# Patient Record
Sex: FEMALE | Race: WHITE | NOT HISPANIC OR LATINO | Employment: OTHER | URBAN - METROPOLITAN AREA
[De-identification: names, ages, dates, MRNs, and addresses within clinical notes are randomized per-mention and may not be internally consistent; named-entity substitution may affect disease eponyms.]

---

## 2017-01-18 ENCOUNTER — GENERIC CONVERSION - ENCOUNTER (OUTPATIENT)
Dept: OTHER | Facility: OTHER | Age: 72
End: 2017-01-18

## 2017-04-24 ENCOUNTER — ALLSCRIPTS OFFICE VISIT (OUTPATIENT)
Dept: OTHER | Facility: OTHER | Age: 72
End: 2017-04-24

## 2017-04-24 DIAGNOSIS — E78.00 PURE HYPERCHOLESTEROLEMIA: ICD-10-CM

## 2017-04-24 DIAGNOSIS — I10 ESSENTIAL (PRIMARY) HYPERTENSION: ICD-10-CM

## 2017-04-24 DIAGNOSIS — E55.9 VITAMIN D DEFICIENCY: ICD-10-CM

## 2017-05-22 ENCOUNTER — APPOINTMENT (OUTPATIENT)
Dept: LAB | Age: 72
End: 2017-05-22
Payer: MEDICARE

## 2017-05-22 ENCOUNTER — TRANSCRIBE ORDERS (OUTPATIENT)
Dept: LAB | Age: 72
End: 2017-05-22

## 2017-05-22 DIAGNOSIS — M81.0 SENILE OSTEOPOROSIS: Primary | ICD-10-CM

## 2017-05-22 DIAGNOSIS — C50.911 MALIGNANT NEOPLASM OF RIGHT FEMALE BREAST, UNSPECIFIED SITE OF BREAST: ICD-10-CM

## 2017-05-22 DIAGNOSIS — C50.011 MALIGNANT NEOPLASM INVOLVING BOTH NIPPLE AND AREOLA OF RIGHT BREAST IN FEMALE (HCC): ICD-10-CM

## 2017-05-22 LAB
ALBUMIN SERPL BCP-MCNC: 3.8 G/DL (ref 3.5–5)
ALP SERPL-CCNC: 52 U/L (ref 46–116)
ALT SERPL W P-5'-P-CCNC: 21 U/L (ref 12–78)
ANION GAP SERPL CALCULATED.3IONS-SCNC: 5 MMOL/L (ref 4–13)
AST SERPL W P-5'-P-CCNC: 21 U/L (ref 5–45)
BILIRUB SERPL-MCNC: 0.96 MG/DL (ref 0.2–1)
BUN SERPL-MCNC: 12 MG/DL (ref 5–25)
CALCIUM SERPL-MCNC: 8.7 MG/DL (ref 8.3–10.1)
CHLORIDE SERPL-SCNC: 109 MMOL/L (ref 100–108)
CO2 SERPL-SCNC: 27 MMOL/L (ref 21–32)
CREAT SERPL-MCNC: 0.66 MG/DL (ref 0.6–1.3)
GFR SERPL CREATININE-BSD FRML MDRD: >60 ML/MIN/1.73SQ M
GLUCOSE P FAST SERPL-MCNC: 95 MG/DL (ref 65–99)
POTASSIUM SERPL-SCNC: 4.1 MMOL/L (ref 3.5–5.3)
PROT SERPL-MCNC: 6.9 G/DL (ref 6.4–8.2)
SODIUM SERPL-SCNC: 141 MMOL/L (ref 136–145)

## 2017-05-22 PROCEDURE — 80053 COMPREHEN METABOLIC PANEL: CPT

## 2017-05-22 PROCEDURE — 36415 COLL VENOUS BLD VENIPUNCTURE: CPT

## 2017-07-19 ENCOUNTER — GENERIC CONVERSION - ENCOUNTER (OUTPATIENT)
Dept: OTHER | Facility: OTHER | Age: 72
End: 2017-07-19

## 2017-09-13 ENCOUNTER — ALLSCRIPTS OFFICE VISIT (OUTPATIENT)
Dept: OTHER | Facility: OTHER | Age: 72
End: 2017-09-13

## 2017-09-15 ENCOUNTER — GENERIC CONVERSION - ENCOUNTER (OUTPATIENT)
Dept: OTHER | Facility: OTHER | Age: 72
End: 2017-09-15

## 2017-10-06 ENCOUNTER — APPOINTMENT (OUTPATIENT)
Dept: LAB | Age: 72
End: 2017-10-06
Payer: MEDICARE

## 2017-10-06 ENCOUNTER — TRANSCRIBE ORDERS (OUTPATIENT)
Dept: LAB | Age: 72
End: 2017-10-06

## 2017-10-06 DIAGNOSIS — E78.00 PURE HYPERCHOLESTEROLEMIA: ICD-10-CM

## 2017-10-06 DIAGNOSIS — E55.9 VITAMIN D DEFICIENCY: ICD-10-CM

## 2017-10-06 DIAGNOSIS — I10 ESSENTIAL (PRIMARY) HYPERTENSION: ICD-10-CM

## 2017-10-06 LAB
25(OH)D3 SERPL-MCNC: 24.2 NG/ML (ref 30–100)
ALBUMIN SERPL BCP-MCNC: 3.6 G/DL (ref 3.5–5)
ALP SERPL-CCNC: 51 U/L (ref 46–116)
ALT SERPL W P-5'-P-CCNC: 22 U/L (ref 12–78)
ANION GAP SERPL CALCULATED.3IONS-SCNC: 7 MMOL/L (ref 4–13)
AST SERPL W P-5'-P-CCNC: 20 U/L (ref 5–45)
BILIRUB DIRECT SERPL-MCNC: 0.17 MG/DL (ref 0–0.2)
BILIRUB SERPL-MCNC: 0.71 MG/DL (ref 0.2–1)
BUN SERPL-MCNC: 11 MG/DL (ref 5–25)
CALCIUM SERPL-MCNC: 8.7 MG/DL (ref 8.3–10.1)
CHLORIDE SERPL-SCNC: 109 MMOL/L (ref 100–108)
CHOLEST SERPL-MCNC: 171 MG/DL (ref 50–200)
CO2 SERPL-SCNC: 26 MMOL/L (ref 21–32)
CREAT SERPL-MCNC: 0.66 MG/DL (ref 0.6–1.3)
GFR SERPL CREATININE-BSD FRML MDRD: 89 ML/MIN/1.73SQ M
GLUCOSE P FAST SERPL-MCNC: 91 MG/DL (ref 65–99)
HDLC SERPL-MCNC: 52 MG/DL (ref 40–60)
LDLC SERPL CALC-MCNC: 84 MG/DL (ref 0–100)
POTASSIUM SERPL-SCNC: 3.7 MMOL/L (ref 3.5–5.3)
PROT SERPL-MCNC: 7 G/DL (ref 6.4–8.2)
SODIUM SERPL-SCNC: 142 MMOL/L (ref 136–145)
TRIGL SERPL-MCNC: 173 MG/DL

## 2017-10-06 PROCEDURE — 36415 COLL VENOUS BLD VENIPUNCTURE: CPT

## 2017-10-06 PROCEDURE — 80076 HEPATIC FUNCTION PANEL: CPT

## 2017-10-06 PROCEDURE — 80048 BASIC METABOLIC PNL TOTAL CA: CPT

## 2017-10-06 PROCEDURE — 82306 VITAMIN D 25 HYDROXY: CPT

## 2017-10-06 PROCEDURE — 80061 LIPID PANEL: CPT

## 2017-10-24 ENCOUNTER — ALLSCRIPTS OFFICE VISIT (OUTPATIENT)
Dept: OTHER | Facility: OTHER | Age: 72
End: 2017-10-24

## 2017-10-25 NOTE — PROGRESS NOTES
Assessment  1  Benign essential hypertension (401 1) (I10)   2  Hypercholesteremia (272 0) (E78 00)   3  Essential hypertriglyceridemia (272 1) (E78 1)   4  Anxiety (300 00) (F41 9)   5  Insomnia (780 52) (G47 00)    Plan  Anxiety, Depression, unspecified depression type, Insomnia    · Sertraline HCl - 50 MG Oral Tablet; TAKE 1 TABLET DAILY  Benign essential hypertension    · AmLODIPine Besylate 10 MG Oral Tablet; TAKE 1 TABLET DAILY   · Benazepril HCl - 10 MG Oral Tablet; TAKE 1 TABLET Once TDD:1  Hypercholesteremia    · Simvastatin 20 MG Oral Tablet; TAKE 1 TABLET DAILY  Insomnia    · Rozerem 8 MG Oral Tablet; TAKE 1 TABLET AT BEDTIME AS NEEDED FOR SLEEP   · Follow-up visit in 5 months Evaluation and Treatment  Follow-up  Status: Hold For -  Scheduling  Requested for: 70JAB8810    Discussion/Summary    Reviewed labs  Encouraged positive activity and nutrition  Educational resources provided: [employer]  Possible side effects of new medications were reviewed with the patient/guardian today  The treatment plan was reviewed with the patient/guardian  The patient/guardian understands and agrees with the treatment plan     Self Referrals: No      Chief Complaint  pt here for 6 month follow up for anxiety and to review BWc/o not sleeping      History of Present Illness  Follow up, review labs  Hard to get to sleep  Has a friend using Rozerem  Thinks about breast CA returning  Worries a lot  Review of Systems    Constitutional: No fever, no chills, feels well, no tiredness, no recent weight gain or weight loss  Eyes: No complaints of eye pain, no red eyes, no eyesight problems, no discharge, no dry eyes, no itching of eyes  ENT: no complaints of earache, no loss of hearing, no nose bleeds, no nasal discharge, no sore throat, no hoarseness  Cardiovascular: No complaints of slow heart rate, no fast heart rate, no chest pain, no palpitations, no leg claudication, no lower extremity edema     Respiratory: No complaints of shortness of breath, no wheezing, no cough, no SOB on exertion, no orthopnea, no PND  Gastrointestinal: No complaints of abdominal pain, no constipation, no nausea or vomiting, no diarrhea, no bloody stools  Genitourinary: No complaints of dysuria, no incontinence, no pelvic pain, no dysmenorrhea, no vaginal discharge or bleeding  Musculoskeletal: No complaints of arthralgias, no myalgias, no joint swelling or stiffness, no limb pain or swelling  Integumentary: No complaints of skin rash or lesions, no itching, no skin wounds, no breast pain or lump  Neurological: No complaints of headache, no confusion, no convulsions, no numbness, no dizziness or fainting, no tingling, no limb weakness, no difficulty walking  Psychiatric: anxiety-- and-- sleep disturbances, but-- as noted in HPI,-- not suicidal,-- no personality change,-- no depression-- and-- no emotional problems  Endocrine: No complaints of proptosis, no hot flashes, no muscle weakness, no deepening of the voice, no feelings of weakness  Hematologic/Lymphatic: No complaints of swollen glands, no swollen glands in the neck, does not bleed easily, does not bruise easily  Active Problems  1  Anxiety (300 00) (F41 9)   2  Arthralgia (719 40) (M25 50)   3  Benign essential hypertension (401 1) (I10)   4  Breast cancer screening (V76 10) (Z12 39)   5  Bulging Disc (C5 - C6) Right (722 0)   6  Bulging Disc (C6 - C7) Right (722 0)   7  Cervical cancer screening (V76 2) (Z12 4)   8  Chronic interstitial cystitis (595 1) (N30 10)   9  Depression, unspecified depression type (311) (F32 9)   10  Encounter for screening mammogram for breast cancer (V76 12) (Z12 31)   11  Essential hypertriglyceridemia (272 1) (E78 1)   12  GERD without esophagitis (530 81) (K21 9)   13  Glaucoma screening (V80 1) (Z13 5)   14  Hypercholesteremia (272 0) (E78 00)   15  Insomnia (780 52) (G47 00)   16   Medicare annual wellness visit, initial (V70 0) (Z00 00)   17  Other, multiple, and unspecified sites, insect bite, nonvenomous (919 4,E906 4)    (W57 XXXA)   18  Panic Disorder Without Agoraphobia (300 01)   19  Pes planus, unspecified laterality (734) (M21 40)   20  Rash (782 1) (R21)   21  Right radial head fracture (813 05) (S52 121A)   22  Screening for colon cancer (V76 51) (Z12 11)   23  Screening for depression (V79 0) (Z13 89)   24  Screening for genitourinary condition (V81 6) (Z13 89)   25  Tiredness (780 79) (R53 83)   26  Visit For: Screening Exam Neurological Disorders (V80 09)   27  Vitamin D deficiency (268 9) (E55 9)    Past Medical History  1  History of Cat bite, initial encounter (879 8,E906 3) (W55 01XA)   2  History of Cellulitis (682 9) (L03 90)   3  History of Elevated amylase (790 5) (R74 8)   4  History of Hay fever (477 9) (J30 1)   5  History of fall (V15 88) (Z91 81)   6  History of fatigue (V13 89) (Z87 898)   7  History of upper respiratory infection (V12 09) (Z87 09)   8  History of Irritable bowel syndrome (564 1) (K58 9)   9  History of Overactive bladder (596 51) (N32 81)   10  History of Postmenopausal Atrophic Vaginitis (627 3)   11  History of Serum lipase elevation (790 5) (R74 8)   12  History of Serum lipase elevation (790 5) (R74 8)   13  History of Subacute pancreatitis (577 0) (K85 90)   14  History of Subacute pancreatitis (577 0) (K85 90)   15  History of Subacute pancreatitis (577 0) (K85 90)   16  History of Urinary Tract Infection (V13 02)    Surgical History  1  History of Gallbladder Surgery   2  History of Gallbladder Surgery   3  History of Oral Surgery Tooth Extraction   4  Denied: History of Recent Surgery   5  History of Tonsillectomy    Family History  Mother    1  Family history of Hypertension (V17 49)   2  Family history of Mother  At Age 80  Father    3  Family history of Father  At Age 80   4  Family history of Heart Disease (V17 49)   5  Family history of Hypertension (V17 49)   6  Family history of Stroke Syndrome (V17 1)    Social History   · Being A Social Drinker   · Former smoker (D32 30) (W99 418)   · Uses Safety Equipment    Current Meds   1  AmLODIPine Besylate 10 MG Oral Tablet; TAKE 1 TABLET DAILY; Therapy: 36ZUS9624 to (Aidee Nicholas)  Requested for: 24Apr2017; Last   Rx:24Apr2017 Ordered   2  Benazepril HCl - 10 MG Oral Tablet; TAKE 1 TABLET Once TDD:1;   Therapy: 22Jan2013 to (Evaluate:21Oct2017)  Requested for: 24Apr2017; Last   Rx:24Apr2017 Ordered   3  Fosamax 70 MG Oral Tablet; TAKE 1 TABLET EVERY WEEK; Therapy: 24Apr2017 to Recorded   4  LORazepam 0 5 MG Oral Tablet; TAKE 1 PO 2-3 TIMES DAILY PRN MDD:3;   Therapy: 39EEV8074 to (Evaluate:18May2015)  Requested for: 70OAI4991; Last   Rx:19Nov2014 Ordered   5  LORazepam 0 5 MG Oral Tablet; take 1 tab BID PRN; Therapy: 19Mxw1407 to (Evaluate:12Nov2017); Last Rx:78Wlr2465 Ordered   6  Probiotic Oral Capsule; USE AS DIRECTED; Therapy: 94Yzy7239 to Recorded   7  Sertraline HCl - 50 MG Oral Tablet; TAKE 1 TABLET DAILY; Therapy: 33Mvm4700 to (Evaluate:11Jan2018)  Requested for: 74Jbf2479; Last   Rx:33Ltk9374 Ordered   8  Simvastatin 20 MG Oral Tablet; TAKE 1 TABLET DAILY; Therapy: 32FHB0347 to (Aidee Nicholas)  Requested for: 24Apr2017; Last   Rx:24Apr2017 Ordered   9  Tamoxifen Citrate 20 MG Oral Tablet; 1 tablet daily; Therapy: 24Apr2017 to Recorded   10  Turmeric Curcumin Oral Capsule; Therapy: 17NQV1063 to Recorded   11  Vitamin D 1000 UNIT Oral Tablet; Therapy: (Recorded:07Oct2016) to Recorded    The medication list was reviewed and updated today  Allergies  1  Bactrim TABS   2  Lyrica CAPS   3  Zithromax PACK   4  Pneumovax 23 INJ  5   Ragweed    Vitals  Vital Signs    Recorded: 78NJH2986 11:35AM   Temperature 98 4 F, Oral   Heart Rate 83   Systolic 491, LUE   Diastolic 74, LUE   Height 5 ft 4 in   Weight 145 lb    BMI Calculated 24 89   BSA Calculated 1 71   O2 Saturation 97     Physical Exam    Constitutional   General appearance: No acute distress, well appearing and well nourished  Eyes   Conjunctiva and lids: No swelling, erythema or discharge  Pupils and irises: Equal, round and reactive to light  Ears, Nose, Mouth, and Throat   External inspection of ears and nose: Normal     Otoscopic examination: Tympanic membranes translucent with normal light reflex  Canals patent without erythema  Nasal mucosa, septum, and turbinates: Normal without edema or erythema  Oropharynx: Normal with no erythema, edema, exudate or lesions  Pulmonary   Respiratory effort: No increased work of breathing or signs of respiratory distress  Auscultation of lungs: Clear to auscultation  Cardiovascular   Auscultation of heart: Normal rate and rhythm, normal S1 and S2, without murmurs  Examination of extremities for edema and/or varicosities: Normal     Carotid pulses: Normal     Abdomen   Abdomen: Non-tender, no masses  Liver and spleen: No hepatomegaly or splenomegaly  Lymphatic   Palpation of lymph nodes in neck: No lymphadenopathy  Musculoskeletal   Gait and station: Normal     Digits and nails: Normal without clubbing or cyanosis  Neurologic   Sensation: No sensory loss  Psychiatric   Orientation to person, place, and time: Normal     Mood and affect: Normal          Results/Data  PHQ-9 Adult Depression Screening 24Oct2017 11:42AM User, [employer]     Test Name Result Flag Reference   PHQ-9 Adult Depression Score 5     Over the last two weeks, how often have you been bothered by any of the following problems?   Little interest or pleasure in doing things: Not at all - 0  Feeling down, depressed, or hopeless: Several days - 1  Trouble falling or staying asleep, or sleeping too much: Nearly every day - 3  Feeling tired or having little energy: Several days - 1  Poor appetite or over eating: Not at all - 0  Feeling bad about yourself - or that you are a failure or have let yourself or your family down: Not at all - 0  Trouble concentrating on things, such as reading the newspaper or watching television: Not at all - 0  Moving or speaking so slowly that other people could have noticed  Or the opposite -  being so fidgety or restless that you have been moving around a lot more than usual: Not at all - 0  Thoughts that you would be better off dead, or of hurting yourself in some way: Not at all - 0   PHQ-9 Adult Depression Screening Negative     PHQ-9 Difficulty Level Not difficult at all     PHQ-9 Severity Mild Depression       Falls Risk Assessment (Dx Z13 89 Screen for Neurologic Disorder) 24Oct2017 11:41AM User, Ahs     Test Name Result Flag Reference   Falls Risk      No falls in the past year     (1) Itz 40GIB6622 07:37AM Rome Abdi Order Number: [id number]     Test Name Result Flag Reference   SODIUM 142 mmol/L  136-145   POTASSIUM 3 7 mmol/L  3 5-5 3   CHLORIDE 109 mmol/L H 100-108   CARBON DIOXIDE 26 mmol/L  21-32   ANION GAP (CALC) 7 mmol/L  4-13   BLOOD UREA NITROGEN 11 mg/dL  5-25   CREATININE 0 66 mg/dL  0 60-1 30   Standardized to IDMS reference method   CALCIUM 8 7 mg/dL  8 3-10 1   eGFR 89 ml/min/1 73sq m     National Kidney Disease Education Program recommendations are as follows:  GFR calculation is accurate only with a steady state creatinine  Chronic Kidney disease less than 60 ml/min/1 73 sq  meters  Kidney failure less than 15 ml/min/1 73 sq  meters  GLUCOSE FASTING 91 mg/dL  65-99   Specimen collection should occur prior to Sulfasalazine administration due to the potential for falsely depressed results  Specimen collection should occur prior to Sulfapyridine administration due to the potential for falsely elevated results       (1) HEPATIC FUNCTION PANEL 50HRE5886 07:37AM [place] Abdi Order Number: [id number]     Test Name Result Flag Reference   ALBUMIN 3 6 g/dL  3 5-5 0   ALK PHOSPHATAS 51 U/L     ALT (SGPT) 22 U/L  12-78   Specimen collection should occur prior to Sulfasalazine and/or Sulfapyridine administration due to the potential for falsely depressed results  AST(SGOT) 20 U/L  5-45   Specimen collection should occur prior to Sulfasalazine and/or Sulfapyridine administration due to the potential for falsely depressed results  BILI, DIRECT 0 17 mg/dL  0 00-0 20   BILI, TOTAL 0 71 mg/dL  0 20-1 00   TOTAL PROTEIN 7 0 g/dL  6 4-8 2     (1) LIPID PANEL, FASTING 30TBU2019 07:37AM Narcisa Mar Order Number: [id number]     Test Name Result Flag Reference   CHOLESTEROL 171 mg/dL     HDL,DIRECT 52 mg/dL  40-60   Specimen collection should occur prior to Metamizole administration due to the potential for falsley depressed results  LDL CHOLESTEROL CALCULATED 84 mg/dL  0-100   Triglyceride:        Normal <150 mg/dl   Borderline High 150-199 mg/dl   High 200-499 mg/dl   Very High >499 mg/dl      Cholesterol:       Desirable <200 mg/dl    Borderline High 200-239 mg/dl    High >239 mg/dl      HDL Cholesterol:       High>59 mg/dL    Low <41 mg/dL      This screening LDL is a calculated result  It does not have the accuracy of the Direct Measured LDL in the monitoring of patients with hyperlipidemia and/or statin therapy  Direct Measure LDL (BLG545) must be ordered separately in these patients  TRIGLYCERIDES 173 mg/dL H <=150   Specimen collection should occur prior to N-Acetylcysteine or Metamizole administration due to the potential for falsely depressed results       (1) VITAMIN D 25-HYDROXY 06Oct2017 07:37AM Narcisa Mar Order Number: [id number]     Test Name Result Flag Reference   VIT D 25-HYDROX 24 2 ng/mL L 30 0-100 0   This assay is a certified procedure of the CDC Vitamin D Standardization Certification Program (VDSCP)     Deficiency <20ng/ml   Insufficiency 20-30ng/ml   Sufficient  ng/ml     *Patients undergoing fluorescein dye angiography may retain small amounts of fluorescein in the body for 48-72 hours post procedure  Samples containing fluorescein can produce falsely elevated Vitamin D values  If the patient had this procedure, a specimen should be resubmitted post fluorescein clearance  Health Management  Benign essential hypertension   (1) COMPREHENSIVE METABOLIC PANEL; every 1 year; Last 28TCU7383; Next Due: 96Xmd4079; Active  (1) LIPID PANEL, FASTING; every 1 year; Last 14UTH5570; Next Due: 83WQA0676; Active  EKG/ECG- POC; every 1 year; Last 14VGE7539; Next Due: 98XUR0713; Overdue  Breast cancer screening   Digital Bilateral Screening Mammogram With CAD; every 1 year; Last 16DGD1798; Next Due:  35TSS5583; Overdue  Cervical cancer screening   (every) THINPREP PAP; every 2 years; Next Due: 72ZYS6929; Overdue  Glaucoma screening   *VB - Glaucoma Screen; every 1 year; Last 07WNE4295; Next Due: 61UHE3622; Overdue  Screening for colon cancer   COLONOSCOPY; every 10 years; Last 71YCB2763; Next Due: 13Qtj4019; Active  Screening for depression   *VB PREVENTIVE 65 MEASURE FALLS  AND UI SCREEN; every 1 year; Last 10MOY3398; Next Due:  46MVC0552; Overdue  Screening for genitourinary condition   *VB PREVENTIVE 65 MEASURE FALLS  AND UI SCREEN; every 1 year; Last 81YMB4569; Next Due:  76ADB1536; Overdue  Visit For: Screening Exam Neurological Disorders   *VB PREVENTIVE 65 MEASURE FALLS  AND UI SCREEN; every 1 year; Last 08XKI2236; Next Due:  61IIB8779; Overdue  Health Maintenance   Medicare Annual Wellness Visit; every 1 year; Next Due: 68NLV3305;  Overdue    Signatures   Electronically signed by : Hernan Gresham DO; Oct 24 2017 12:20PM EST                       (Author)

## 2017-12-08 ENCOUNTER — GENERIC CONVERSION - ENCOUNTER (OUTPATIENT)
Dept: FAMILY MEDICINE CLINIC | Facility: CLINIC | Age: 72
End: 2017-12-08

## 2018-01-10 NOTE — MISCELLANEOUS
Message     Date: 03/16/2016 06:36 PM,    Patient called to say she had a cat bite and went to [facility] urgent care  She was given a tetanus shot and an antibotic  Patient states the bite happened this morning by a neighborhood stray cat  Patient's neighbor usually feeds a group of cats every morning and she was helping put one of the cats into a cage when it bit her  They were not able to capture cat and the cats tail appeared to be bitten by another animal  Patient did have rabies shots about 6 years ago at [facility] and was told it may be good for 10 years but told by the urgent care provider to check with family Dr to make the call whether she needs the rabies shots again or not  Per Dr Tirso Barillas verbal orders, I called patient back and let her know Dr Ori Dodd did suggest she go to the [facility] emergency room to get the rabies injections  Patient stated she understood and will go  VR/vfp        Active Problems    1  Arthralgia (719 40) (M25 50)   2  Benign essential hypertension (401 1) (I10)   3  Breast cancer screening (V76 10) (Z12 39)   4  Bulging Disc (C5 - C6) Right (722 0)   5  Bulging Disc (C6 - C7) Right (722 0)   6  Cat bite, initial encounter (879 8,E906 3) (W55 01XA)   7  Cervical cancer screening (V76 2) (Z12 4)   8  Chronic interstitial cystitis (595 1) (N30 10)   9  Essential hypertriglyceridemia (272 1) (E78 1)   10  GERD without esophagitis (530 81) (K21 9)   11  Glaucoma screening (V80 1) (Z13 5)   12  Hypercholesteremia (272 0) (E78 0)   13  Insomnia (780 52) (G47 00)   14  Other, multiple, and unspecified sites, insect bite, nonvenomous (919 4,E906 4)    (W57 XXXA)   15  Panic Disorder Without Agoraphobia (300 01)   16  Pes planus, unspecified laterality (734) (M21 40)   17  Screening for colon cancer (V76 51) (Z12 11)   18  Screening for depression (V79 0) (Z13 89)   19  Screening for genitourinary condition (V81 6) (Z13 89)   20   Visit For: Screening Exam Neurological Disorders (V80 09)   21  Vitamin D deficiency (268 9) (E55 9)    Current Meds   1  AmLODIPine Besylate 10 MG Oral Tablet; TAKE 1 TABLET DAILY; Therapy: 04EMZ4963 to (Evaluate:18Mar2016)  Requested for: 28PWA8904; Last   Rx:24Mar2015 Ordered   2  Amoxicillin-Pot Clavulanate 875-125 MG Oral Tablet; TAKE 1 TABLET TWICE DAILY   UNTIL FINISHED; Therapy: 74TYA3495 to (Leonarda Pinto)  Requested for: 19UVL6622; Last   Rx:16Mar2016 Ordered   3  Benazepril HCl - 10 MG Oral Tablet; TAKE 1 TABLET Once TDD:1;   Therapy: 22Jan2013 to (Evaluate:30May2016)  Requested for: 04Xgc9274; Last   Rx:66Mjx7738 Ordered   4  Elmiron 100 MG Oral Capsule; Therapy: 13YQI2137 to (Last Rx:13Wbt5581)  Requested for: 55NIF4966 Ordered   5  LORazepam 0 5 MG Oral Tablet; TAKE 1 PO 2-3 TIMES DAILY PRN MDD:3;   Therapy: 40JCI4521 to (Evaluate:18May2015)  Requested for: 36DJH8280; Last   Rx:19Nov2014 Ordered   6  Simvastatin 20 MG Oral Tablet; TAKE 1 TABLET DAILY; Therapy: 40PGM0753 to (Evaluate:56Bpu1437)  Requested for: 49FRK8204; Last   Rx:36Jzv8732 Ordered    Allergies    1  Bactrim TABS   2  Lyrica CAPS   3  Zithromax PACK   4  Pneumovax 23 INJ    5   Ragweed    Signatures   Electronically signed by : Sujatha Sands DO; Mar 17 2016 11:50AM EST                       (Author)

## 2018-01-13 VITALS
DIASTOLIC BLOOD PRESSURE: 82 MMHG | RESPIRATION RATE: 16 BRPM | HEART RATE: 87 BPM | TEMPERATURE: 98.1 F | OXYGEN SATURATION: 96 % | SYSTOLIC BLOOD PRESSURE: 138 MMHG | BODY MASS INDEX: 24.24 KG/M2 | WEIGHT: 142 LBS | HEIGHT: 64 IN

## 2018-01-13 VITALS
BODY MASS INDEX: 24.75 KG/M2 | TEMPERATURE: 98.4 F | WEIGHT: 145 LBS | HEART RATE: 83 BPM | SYSTOLIC BLOOD PRESSURE: 130 MMHG | OXYGEN SATURATION: 97 % | DIASTOLIC BLOOD PRESSURE: 74 MMHG | HEIGHT: 64 IN

## 2018-01-14 VITALS — DIASTOLIC BLOOD PRESSURE: 68 MMHG | SYSTOLIC BLOOD PRESSURE: 120 MMHG

## 2018-01-29 ENCOUNTER — APPOINTMENT (OUTPATIENT)
Dept: RADIOLOGY | Age: 73
End: 2018-01-29
Attending: PHYSICIAN ASSISTANT
Payer: MEDICARE

## 2018-01-29 ENCOUNTER — APPOINTMENT (OUTPATIENT)
Dept: LAB | Age: 73
End: 2018-01-29
Payer: MEDICARE

## 2018-01-29 ENCOUNTER — TRANSCRIBE ORDERS (OUTPATIENT)
Dept: LAB | Age: 73
End: 2018-01-29

## 2018-01-29 ENCOUNTER — TRANSCRIBE ORDERS (OUTPATIENT)
Dept: ADMINISTRATIVE | Age: 73
End: 2018-01-29

## 2018-01-29 ENCOUNTER — OFFICE VISIT (OUTPATIENT)
Dept: URGENT CARE | Age: 73
End: 2018-01-29
Payer: MEDICARE

## 2018-01-29 VITALS
HEART RATE: 78 BPM | SYSTOLIC BLOOD PRESSURE: 120 MMHG | DIASTOLIC BLOOD PRESSURE: 80 MMHG | HEIGHT: 64 IN | RESPIRATION RATE: 18 BRPM | OXYGEN SATURATION: 98 % | BODY MASS INDEX: 24.24 KG/M2 | TEMPERATURE: 98 F | WEIGHT: 142 LBS

## 2018-01-29 DIAGNOSIS — M25.522 LEFT ELBOW PAIN: ICD-10-CM

## 2018-01-29 DIAGNOSIS — M25.532 LEFT WRIST PAIN: ICD-10-CM

## 2018-01-29 DIAGNOSIS — S50.02XA CONTUSION OF LEFT ELBOW, INITIAL ENCOUNTER: Primary | ICD-10-CM

## 2018-01-29 DIAGNOSIS — M81.0 SENILE OSTEOPOROSIS: Primary | ICD-10-CM

## 2018-01-29 DIAGNOSIS — C50.011 MALIGNANT NEOPLASM OF NIPPLE OF RIGHT BREAST IN FEMALE, UNSPECIFIED ESTROGEN RECEPTOR STATUS (HCC): ICD-10-CM

## 2018-01-29 LAB
ALBUMIN SERPL BCP-MCNC: 3.7 G/DL (ref 3.5–5)
ALP SERPL-CCNC: 47 U/L (ref 46–116)
ALT SERPL W P-5'-P-CCNC: 20 U/L (ref 12–78)
ANION GAP SERPL CALCULATED.3IONS-SCNC: 7 MMOL/L (ref 4–13)
AST SERPL W P-5'-P-CCNC: 20 U/L (ref 5–45)
BILIRUB SERPL-MCNC: 0.83 MG/DL (ref 0.2–1)
BUN SERPL-MCNC: 11 MG/DL (ref 5–25)
CALCIUM SERPL-MCNC: 8.4 MG/DL (ref 8.3–10.1)
CHLORIDE SERPL-SCNC: 109 MMOL/L (ref 100–108)
CO2 SERPL-SCNC: 26 MMOL/L (ref 21–32)
CREAT SERPL-MCNC: 0.78 MG/DL (ref 0.6–1.3)
GFR SERPL CREATININE-BSD FRML MDRD: 77 ML/MIN/1.73SQ M
GLUCOSE SERPL-MCNC: 127 MG/DL (ref 65–140)
POTASSIUM SERPL-SCNC: 3.9 MMOL/L (ref 3.5–5.3)
PROT SERPL-MCNC: 7.1 G/DL (ref 6.4–8.2)
SODIUM SERPL-SCNC: 142 MMOL/L (ref 136–145)

## 2018-01-29 PROCEDURE — 36415 COLL VENOUS BLD VENIPUNCTURE: CPT

## 2018-01-29 PROCEDURE — 73130 X-RAY EXAM OF HAND: CPT

## 2018-01-29 PROCEDURE — G0463 HOSPITAL OUTPT CLINIC VISIT: HCPCS | Performed by: FAMILY MEDICINE

## 2018-01-29 PROCEDURE — 73110 X-RAY EXAM OF WRIST: CPT

## 2018-01-29 PROCEDURE — 80053 COMPREHEN METABOLIC PANEL: CPT

## 2018-01-29 PROCEDURE — 73080 X-RAY EXAM OF ELBOW: CPT

## 2018-01-29 PROCEDURE — 99214 OFFICE O/P EST MOD 30 MIN: CPT | Performed by: FAMILY MEDICINE

## 2018-01-29 RX ORDER — ASPIRIN 81 MG/1
81 TABLET ORAL DAILY
COMMUNITY
End: 2020-10-19

## 2018-01-29 RX ORDER — BENAZEPRIL HYDROCHLORIDE 10 MG/1
10 TABLET ORAL
COMMUNITY
Start: 2015-11-02 | End: 2018-04-18 | Stop reason: SDUPTHER

## 2018-01-29 RX ORDER — LACTOBACILLUS RHAMNOSUS GG 10B CELL
1 CAPSULE ORAL
COMMUNITY
End: 2018-09-26

## 2018-01-29 RX ORDER — SIMVASTATIN 20 MG
20 TABLET ORAL
COMMUNITY
Start: 2015-10-26 | End: 2018-06-06 | Stop reason: SDUPTHER

## 2018-01-29 RX ORDER — ALENDRONATE SODIUM 70 MG/1
70 TABLET ORAL WEEKLY
COMMUNITY
Start: 2017-12-08 | End: 2019-02-18

## 2018-01-29 RX ORDER — RAMELTEON 8 MG/1
1 TABLET ORAL
COMMUNITY
Start: 2017-10-24 | End: 2018-09-26

## 2018-01-29 RX ORDER — TAMOXIFEN CITRATE 20 MG/1
20 TABLET ORAL DAILY
COMMUNITY
Start: 2017-12-08 | End: 2021-10-19

## 2018-01-29 RX ORDER — LORAZEPAM 0.5 MG/1
1 TABLET ORAL 2 TIMES DAILY PRN
COMMUNITY
Start: 2017-09-13 | End: 2018-04-18 | Stop reason: SDUPTHER

## 2018-01-29 RX ORDER — AMLODIPINE BESYLATE 10 MG/1
10 TABLET ORAL
COMMUNITY
Start: 2015-09-26 | End: 2018-06-06 | Stop reason: SDUPTHER

## 2018-01-29 RX ORDER — AMITRIPTYLINE HYDROCHLORIDE 25 MG/1
25 TABLET, FILM COATED ORAL
COMMUNITY
Start: 2015-11-28 | End: 2018-09-26

## 2018-01-29 NOTE — PROGRESS NOTES
Assessment/Plan     Diagnoses and all orders for this visit:    Left wrist pain  -     X-ray hand left 3+ views  -     X-ray wrist left 3+ views    Left elbow pain  -     X-ray elbow left 3+ views  Cannot exclude fracture- repeat xrays in 5-7 days                  Subjective:     Patient ID: Jose Fournier is a 71 y o  female  Chief Complaint:    80-year-old right-hand dominant female states she tripped over a table leg lasting her kitchen and fell onto her left side injuring her left elbow, left wrist and left knee  She states her left knee is nontender  Most of her pain is in her left elbow but also some pain in her left wrist   She did not apply ice but she did take only  She denies head injury  Past Medical History:   Diagnosis Date    Allergic     Anxiety     Arthritis     Bulging discs     Cancer ([address] )     Chronic interstitial cystitis     GERD (gastroesophageal reflux disease)     Hyperlipidemia     Hypertension     Psychiatric disorder     Vitamin D deficiency        Past Surgical History:   Procedure Laterality Date    CHOLECYSTECTOMY         No family history on file  Review of Systems   Constitutional: Negative  Musculoskeletal:        Left elbow pain, left wrist pain   Neurological: Negative for headaches  All other systems reviewed and are negative  Objective:    /80 (BP Location: Right arm, Patient Position: Sitting)   Pulse 78   Temp 98 °F (36 7 °C) (Temporal)   Resp 18   Ht 5' 4" (1 626 m)   Wt 64 4 kg (142 lb)   LMP  (LMP Unknown)   SpO2 98%   BMI 24 37 kg/m²     Physical Exam   Constitutional: She is oriented to person, place, and time  She appears well-developed and well-nourished  Musculoskeletal:        Left elbow: She exhibits decreased range of motion and swelling  Tenderness found  Radial head and olecranon process tenderness noted  Left wrist: She exhibits decreased range of motion and tenderness  She exhibits no swelling  Neurological: She is alert and oriented to person, place, and time  Psychiatric: She has a normal mood and affect  Her behavior is normal    Nursing note and vitals reviewed  X-ray elbow left 3+ views   Final Result by Carolee Adair MD (01/29 1054)      The presence of a left elbow joint effusion strongly suggests subtle nondisplaced left elbow fracture despite absence of direct evidence of detectable cortical fracture line  Repeat left elbow radiographic evaluation in 5-7 days is recommended  Workstation performed: [id number]         X-ray wrist left 3+ views   Final Result by Carolee Adair MD (01/29 1049)      No acute osseous abnormality  Degenerative changes as described  Workstation performed: [id number]         X-ray hand left 3+ views   Final Result by Carolee Adair MD (01/29 1049)      No acute osseous abnormality  Degenerative changes as described           Workstation performed: [id number]

## 2018-01-29 NOTE — PATIENT INSTRUCTIONS
They cannot exclude a fracture of your elbow, so the radiologist is suggesting a repeat x-ray in 5-7 days  Ice, elevate, take tylenol or motrin for discomfort  Use ace wrap for comfort during the day  Your family physician can order the repeat xray

## 2018-04-18 ENCOUNTER — OFFICE VISIT (OUTPATIENT)
Dept: FAMILY MEDICINE CLINIC | Facility: CLINIC | Age: 73
End: 2018-04-18
Payer: MEDICARE

## 2018-04-18 VITALS
OXYGEN SATURATION: 95 % | DIASTOLIC BLOOD PRESSURE: 86 MMHG | BODY MASS INDEX: 24.62 KG/M2 | HEART RATE: 86 BPM | TEMPERATURE: 98.6 F | HEIGHT: 64 IN | SYSTOLIC BLOOD PRESSURE: 134 MMHG | WEIGHT: 144.2 LBS

## 2018-04-18 DIAGNOSIS — E78.1 HYPERTRIGLYCERIDEMIA: ICD-10-CM

## 2018-04-18 DIAGNOSIS — F33.8 SEASONAL AFFECTIVE DISORDER (HCC): ICD-10-CM

## 2018-04-18 DIAGNOSIS — F51.02 ADJUSTMENT INSOMNIA: ICD-10-CM

## 2018-04-18 DIAGNOSIS — F41.9 ANXIETY: ICD-10-CM

## 2018-04-18 DIAGNOSIS — I10 ESSENTIAL HYPERTENSION: Primary | ICD-10-CM

## 2018-04-18 PROCEDURE — 99214 OFFICE O/P EST MOD 30 MIN: CPT | Performed by: FAMILY MEDICINE

## 2018-04-18 RX ORDER — LORAZEPAM 0.5 MG/1
0.5 TABLET ORAL 2 TIMES DAILY PRN
Qty: 30 TABLET | Refills: 1 | Status: SHIPPED | OUTPATIENT
Start: 2018-04-18 | End: 2018-09-26 | Stop reason: SDUPTHER

## 2018-04-18 RX ORDER — ESCITALOPRAM OXALATE 10 MG/1
10 TABLET ORAL DAILY
Qty: 90 TABLET | Refills: 1 | Status: SHIPPED | OUTPATIENT
Start: 2018-04-18 | End: 2018-09-26

## 2018-04-18 RX ORDER — BENAZEPRIL HYDROCHLORIDE 10 MG/1
10 TABLET ORAL DAILY
Qty: 90 TABLET | Refills: 1 | Status: SHIPPED | OUTPATIENT
Start: 2018-04-18 | End: 2018-06-06 | Stop reason: SDUPTHER

## 2018-04-18 NOTE — PATIENT INSTRUCTIONS
DC Zoloft and start Lexapro  Discussed lack of physical activity and need of some type of exercising  Reviewed nutrition

## 2018-04-18 NOTE — PROGRESS NOTES
Assessment/Plan: patient here today for follow up for hypertension and to talk about medication     No problem-specific Assessment & Plan notes found for this encounter  1  Essential hypertension  benazepril (LOTENSIN) 10 mg tablet   2  Anxiety  LORazepam (ATIVAN) 0 5 mg tablet    escitalopram (LEXAPRO) 10 mg tablet   3  Adjustment insomnia     4  Seasonal affective disorder ([address] )     5  Hypertriglyceridemia          There are no diagnoses linked to this encounter  Subjective:      Patient ID: Marinda Claude is a 71 y o  female  Follow up  Need change to Lexapro, breast CA recommended  Weaning off Elavil  Feels very stressed, tired of the Winter  Hard being in the house all winter and never should of retired 2 years ago  Continues with Tamoxifen for breast Ca  Problems sleeping  The following portions of the patient's history were reviewed and updated as appropriate: allergies, current medications, past family history, past medical history, past social history, past surgical history and problem list     Review of Systems   Constitutional: Negative  HENT: Negative  Respiratory: Negative  Cardiovascular: Negative  Gastrointestinal: Negative  Genitourinary: Negative  Musculoskeletal: Negative  Skin: Negative  Neurological: Negative  Psychiatric/Behavioral: The patient is nervous/anxious  Objective:      /86 (BP Location: Left arm, Patient Position: Sitting, Cuff Size: Standard)   Pulse 86   Temp 98 6 °F (37 °C) (Oral)   Ht 5' 4" (1 626 m)   Wt 65 4 kg (144 lb 3 2 oz)   LMP  (LMP Unknown)   SpO2 95%   BMI 24 75 kg/m²          Physical Exam   Constitutional: She is oriented to person, place, and time  She appears well-developed and well-nourished  HENT:   Head: Normocephalic and atraumatic  Right Ear: External ear normal    Left Ear: External ear normal    Cardiovascular: Normal rate, regular rhythm and normal heart sounds  Pulmonary/Chest: Effort normal and breath sounds normal    Abdominal: Soft  Bowel sounds are normal    Neurological: She is alert and oriented to person, place, and time  Skin: Skin is warm and dry     Psychiatric: Her behavior is normal  Judgment and thought content normal

## 2018-05-29 RX ORDER — AMLODIPINE BESYLATE 10 MG/1
TABLET ORAL
Qty: 90 TABLET | Refills: 1 | OUTPATIENT
Start: 2018-05-29

## 2018-05-29 RX ORDER — SIMVASTATIN 20 MG
TABLET ORAL
Qty: 90 TABLET | Refills: 1 | OUTPATIENT
Start: 2018-05-29

## 2018-06-06 ENCOUNTER — OFFICE VISIT (OUTPATIENT)
Dept: FAMILY MEDICINE CLINIC | Facility: CLINIC | Age: 73
End: 2018-06-06
Payer: MEDICARE

## 2018-06-06 VITALS
WEIGHT: 143.2 LBS | OXYGEN SATURATION: 98 % | BODY MASS INDEX: 24.45 KG/M2 | DIASTOLIC BLOOD PRESSURE: 68 MMHG | HEIGHT: 64 IN | TEMPERATURE: 98.4 F | SYSTOLIC BLOOD PRESSURE: 126 MMHG | HEART RATE: 72 BPM

## 2018-06-06 DIAGNOSIS — F41.9 ANXIETY: Primary | ICD-10-CM

## 2018-06-06 DIAGNOSIS — E78.00 HYPERCHOLESTEREMIA: ICD-10-CM

## 2018-06-06 DIAGNOSIS — I10 ESSENTIAL HYPERTENSION: ICD-10-CM

## 2018-06-06 DIAGNOSIS — L82.1 SEBORRHEIC KERATOSES: ICD-10-CM

## 2018-06-06 DIAGNOSIS — F32.0 CURRENT MILD EPISODE OF MAJOR DEPRESSIVE DISORDER WITHOUT PRIOR EPISODE (HCC): ICD-10-CM

## 2018-06-06 PROCEDURE — 99214 OFFICE O/P EST MOD 30 MIN: CPT | Performed by: FAMILY MEDICINE

## 2018-06-06 RX ORDER — BENAZEPRIL HYDROCHLORIDE 10 MG/1
10 TABLET ORAL DAILY
Qty: 90 TABLET | Refills: 1 | Status: SHIPPED | OUTPATIENT
Start: 2018-06-06 | End: 2018-09-26 | Stop reason: SDUPTHER

## 2018-06-06 RX ORDER — SIMVASTATIN 20 MG
20 TABLET ORAL
Qty: 90 TABLET | Refills: 1 | Status: SHIPPED | OUTPATIENT
Start: 2018-06-06 | End: 2018-12-26 | Stop reason: SDUPTHER

## 2018-06-06 RX ORDER — AMLODIPINE BESYLATE 10 MG/1
10 TABLET ORAL DAILY
Qty: 90 TABLET | Refills: 1 | Status: SHIPPED | OUTPATIENT
Start: 2018-06-06 | End: 2018-12-26 | Stop reason: SDUPTHER

## 2018-06-06 NOTE — PROGRESS NOTES
Assessment/Plan: patient here today for follow up for derepression and anxiety     No problem-specific Assessment & Plan notes found for this encounter  1  Anxiety     2  Current mild episode of major depressive disorder without prior episode (HCC)     3  Seborrheic keratoses     4  Essential hypertension     5  Hypercholesteremia          There are no diagnoses linked to this encounter  Subjective:      Patient ID: Yecenia Matos is a 71 y o  female  Follow up, doing much better   back to working  Feels nice and calm  Seems Derm in September  Has SK on lower extremities  Review of Systems   Constitutional: Negative  HENT: Negative  Respiratory: Negative  Cardiovascular: Negative  Gastrointestinal: Negative  Genitourinary: Negative  Skin:        Moles on LE's  Psychiatric/Behavioral: Negative for sleep disturbance and suicidal ideas  The patient is nervous/anxious  Objective:      /68 (BP Location: Left arm, Patient Position: Sitting, Cuff Size: Standard)   Pulse 72   Temp 98 4 °F (36 9 °C) (Oral)   Ht 5' 4" (1 626 m)   Wt 65 kg (143 lb 3 2 oz)   SpO2 98%   BMI 24 58 kg/m²          Physical Exam   Constitutional: She is oriented to person, place, and time  She appears well-developed and well-nourished  HENT:   Head: Normocephalic and atraumatic  Cardiovascular: Normal rate, regular rhythm and normal heart sounds  Pulmonary/Chest: Effort normal and breath sounds normal    Neurological: She is alert and oriented to person, place, and time  Skin:   SK LE's  Psychiatric: She has a normal mood and affect   Her behavior is normal  Judgment and thought content normal

## 2018-06-06 NOTE — PATIENT INSTRUCTIONS
Refill Lexapro  Physical activity  Continue BP Med  Hydration with water  Need proper rest   Discussed positive thinking  Reviewed nutrition, caution with processed foods

## 2018-09-26 ENCOUNTER — OFFICE VISIT (OUTPATIENT)
Dept: FAMILY MEDICINE CLINIC | Facility: CLINIC | Age: 73
End: 2018-09-26
Payer: MEDICARE

## 2018-09-26 VITALS
TEMPERATURE: 98.3 F | HEIGHT: 64 IN | SYSTOLIC BLOOD PRESSURE: 122 MMHG | HEART RATE: 80 BPM | BODY MASS INDEX: 24.21 KG/M2 | OXYGEN SATURATION: 95 % | DIASTOLIC BLOOD PRESSURE: 74 MMHG | WEIGHT: 141.8 LBS

## 2018-09-26 DIAGNOSIS — E55.9 VITAMIN D DEFICIENCY: Primary | ICD-10-CM

## 2018-09-26 DIAGNOSIS — F43.9 STRESS AT HOME: ICD-10-CM

## 2018-09-26 DIAGNOSIS — I10 ESSENTIAL HYPERTENSION: ICD-10-CM

## 2018-09-26 DIAGNOSIS — F41.9 ANXIETY: ICD-10-CM

## 2018-09-26 PROCEDURE — 99213 OFFICE O/P EST LOW 20 MIN: CPT | Performed by: FAMILY MEDICINE

## 2018-09-26 RX ORDER — LORAZEPAM 0.5 MG/1
0.5 TABLET ORAL 2 TIMES DAILY PRN
Qty: 30 TABLET | Refills: 1 | Status: SHIPPED | OUTPATIENT
Start: 2018-09-26 | End: 2018-11-13 | Stop reason: SDUPTHER

## 2018-09-26 RX ORDER — BENAZEPRIL HYDROCHLORIDE 10 MG/1
10 TABLET ORAL DAILY
Qty: 90 TABLET | Refills: 1 | Status: SHIPPED | OUTPATIENT
Start: 2018-09-26 | End: 2019-03-06 | Stop reason: SDUPTHER

## 2018-09-26 NOTE — PROGRESS NOTES
Assessment/Plan: patient here today for follow up for anxiety  Pt need Rx refills  Pt would like to talk about increasing medication dose     PHQ-9 Depression Screening    PHQ-9:    Frequency of the following problems over the past two weeks:            Fall Risk Assesment      Most Recent Value   Fall Risk   One or more falls in the last year:  No   Advised to use a cane or walker to get around safely:  No   Feels unsteady when walking:  No   Steadies self on furniture while walking at home:  No   Worried about falling:  No          No problem-specific Assessment & Plan notes found for this encounter  Diagnoses and all orders for this visit:    Vitamin D deficiency  -     Vitamin D 25 hydroxy; Future    Anxiety  -     LORazepam (ATIVAN) 0 5 mg tablet; Take 1 tablet (0 5 mg total) by mouth 2 (two) times a day as needed for anxiety for up to 30 days    Essential hypertension  -     benazepril (LOTENSIN) 10 mg tablet; Take 1 tablet (10 mg total) by mouth daily for 180 days  -     Basic metabolic panel; Future  -     CBC and Platelet; Future  -     Hepatic function panel; Future  -     Lipid panel; Future    Stress at home    Other orders  -     NON FORMULARY; Subjective:      Patient ID: Marley Mclean is a 68 y o  female  Follow up  Stress is a little better  Uses Ativan maybe 1 - 2 a week   with #3 Melanomas so far  Review labs  The following portions of the patient's history were reviewed and updated as appropriate: allergies, current medications, past family history, past medical history, past social history, past surgical history and problem list     Review of Systems   Constitutional: Negative  HENT: Negative  Eyes: Negative  Respiratory: Negative  Cardiovascular: Negative  Gastrointestinal: Negative  Genitourinary: Negative  Musculoskeletal: Negative  Skin: Negative  Neurological: Negative      Psychiatric/Behavioral: The patient is nervous/anxious  Objective:      /74 (BP Location: Left arm, Patient Position: Sitting, Cuff Size: Standard)   Pulse 80   Temp 98 3 °F (36 8 °C) (Oral)   Ht 5' 4" (1 626 m)   Wt 64 3 kg (141 lb 12 8 oz)   SpO2 95%   BMI 24 34 kg/m²          Physical Exam   Constitutional: She is oriented to person, place, and time  She appears well-developed and well-nourished  HENT:   Head: Normocephalic and atraumatic  Right Ear: External ear normal    Left Ear: External ear normal    Nose: Nose normal    Mouth/Throat: Oropharynx is clear and moist    Eyes: Conjunctivae and EOM are normal  Pupils are equal, round, and reactive to light  Neck: Normal range of motion  Neck supple  Cardiovascular: Normal rate, regular rhythm, normal heart sounds and intact distal pulses  Pulmonary/Chest: Effort normal and breath sounds normal    Abdominal: Soft  Bowel sounds are normal    Neurological: She is alert and oriented to person, place, and time  Skin: Skin is warm and dry  Psychiatric: She has a normal mood and affect   Her behavior is normal  Judgment and thought content normal

## 2018-10-08 DIAGNOSIS — F41.9 ANXIETY: ICD-10-CM

## 2018-10-08 DIAGNOSIS — E78.00 HYPERCHOLESTEREMIA: ICD-10-CM

## 2018-10-08 DIAGNOSIS — I10 ESSENTIAL HYPERTENSION: ICD-10-CM

## 2018-10-08 DIAGNOSIS — R35.0 FREQUENCY OF URINATION: Primary | ICD-10-CM

## 2018-10-08 RX ORDER — SIMVASTATIN 20 MG
20 TABLET ORAL
Qty: 90 TABLET | Refills: 1 | OUTPATIENT
Start: 2018-10-08

## 2018-10-08 RX ORDER — AMLODIPINE BESYLATE 10 MG/1
10 TABLET ORAL DAILY
Qty: 90 TABLET | Refills: 1 | OUTPATIENT
Start: 2018-10-08

## 2018-10-08 RX ORDER — ESCITALOPRAM OXALATE 10 MG/1
10 TABLET ORAL DAILY
Qty: 90 TABLET | Refills: 1 | OUTPATIENT
Start: 2018-10-08

## 2018-10-09 RX ORDER — AMITRIPTYLINE HYDROCHLORIDE 25 MG/1
TABLET, FILM COATED ORAL
Qty: 90 TABLET | Refills: 3 | Status: SHIPPED | OUTPATIENT
Start: 2018-10-09 | End: 2018-11-13

## 2018-11-13 ENCOUNTER — OFFICE VISIT (OUTPATIENT)
Dept: FAMILY MEDICINE CLINIC | Facility: CLINIC | Age: 73
End: 2018-11-13
Payer: MEDICARE

## 2018-11-13 VITALS
WEIGHT: 142 LBS | BODY MASS INDEX: 24.24 KG/M2 | HEART RATE: 77 BPM | OXYGEN SATURATION: 96 % | HEIGHT: 64 IN | SYSTOLIC BLOOD PRESSURE: 128 MMHG | DIASTOLIC BLOOD PRESSURE: 74 MMHG | TEMPERATURE: 98.3 F

## 2018-11-13 DIAGNOSIS — F41.9 ANXIETY DISORDER, UNSPECIFIED TYPE: ICD-10-CM

## 2018-11-13 DIAGNOSIS — R06.02 SOB (SHORTNESS OF BREATH): Primary | ICD-10-CM

## 2018-11-13 DIAGNOSIS — F43.9 STRESS AT HOME: ICD-10-CM

## 2018-11-13 DIAGNOSIS — F41.9 ANXIETY: ICD-10-CM

## 2018-11-13 DIAGNOSIS — F41.0 PANIC ATTACKS: ICD-10-CM

## 2018-11-13 PROCEDURE — 93000 ELECTROCARDIOGRAM COMPLETE: CPT | Performed by: FAMILY MEDICINE

## 2018-11-13 PROCEDURE — 99214 OFFICE O/P EST MOD 30 MIN: CPT | Performed by: FAMILY MEDICINE

## 2018-11-13 RX ORDER — LORAZEPAM 0.5 MG/1
0.5 TABLET ORAL 2 TIMES DAILY PRN
Qty: 30 TABLET | Refills: 3 | Status: SHIPPED | OUTPATIENT
Start: 2018-11-13 | End: 2019-04-25 | Stop reason: SDUPTHER

## 2018-11-13 RX ORDER — VENLAFAXINE HYDROCHLORIDE 75 MG/1
75 CAPSULE, EXTENDED RELEASE ORAL DAILY
Qty: 30 CAPSULE | Refills: 3 | Status: SHIPPED | OUTPATIENT
Start: 2018-11-13 | End: 2018-12-26 | Stop reason: ALTCHOICE

## 2018-11-13 NOTE — PROGRESS NOTES
Assessment/Plan: patient here today for depression, heaviness in chest, SOB and insomnia      No problem-specific Assessment & Plan notes found for this encounter  Diagnoses and all orders for this visit:    SOB (shortness of breath)  -     POCT ECG    Anxiety disorder, unspecified type  -     venlafaxine (EFFEXOR-XR) 75 mg 24 hr capsule; Take 1 capsule (75 mg total) by mouth daily    Panic attacks    Stress at home    Anxiety  -     LORazepam (ATIVAN) 0 5 mg tablet; Take 1 tablet (0 5 mg total) by mouth 2 (two) times a day as needed for anxiety for up to 30 days          Subjective:      Patient ID: Saritha Wolfe is a 68 y o  female  Here for feeling very stressed, problems with  - stressing her out  Gets chest heavyness then trouble breathing, panic attacks, anxiety  Would like something for depression/anxiety    Patient emotional         The following portions of the patient's history were reviewed and updated as appropriate: allergies, current medications, past family history, past medical history, past social history, past surgical history and problem list     Allergies   Allergen Reactions    Bactrim [Sulfamethoxazole-Trimethoprim]     Lyrica [Pregabalin]     Pneumovax 23 [Pneumococcal Vac Polyvalent]     Pollen Extract     Zithromax [Azithromycin]        Current Outpatient Prescriptions:     alendronate (FOSAMAX) 70 mg tablet, Take 70 mg by mouth Once a week, Disp: , Rfl:     amLODIPine (NORVASC) 10 mg tablet, Take 1 tablet (10 mg total) by mouth daily, Disp: 90 tablet, Rfl: 1    aspirin (ECOTRIN LOW STRENGTH) 81 mg EC tablet, Take 81 mg by mouth, Disp: , Rfl:     benazepril (LOTENSIN) 10 mg tablet, Take 1 tablet (10 mg total) by mouth daily for 180 days, Disp: 90 tablet, Rfl: 1    cholecalciferol (VITAMIN D3) 1,000 units tablet, Take by mouth, Disp: , Rfl:     LORazepam (ATIVAN) 0 5 mg tablet, Take 1 tablet (0 5 mg total) by mouth 2 (two) times a day as needed for anxiety for up to 30 days, Disp: 30 tablet, Rfl: 3    NON FORMULARY, , Disp: , Rfl:     simvastatin (ZOCOR) 20 mg tablet, Take 1 tablet (20 mg total) by mouth daily at bedtime, Disp: 90 tablet, Rfl: 1    tamoxifen (NOLVADEX) 20 mg tablet, Take 20 mg by mouth, Disp: , Rfl:     venlafaxine (EFFEXOR-XR) 75 mg 24 hr capsule, Take 1 capsule (75 mg total) by mouth daily, Disp: 30 capsule, Rfl: 3    Review of Systems   Constitutional: Negative  HENT: Negative  Eyes: Negative  Respiratory: Positive for chest tightness  Cardiovascular: Negative  Negative for palpitations  No exertional symptoms  Gastrointestinal: Negative  Genitourinary: Negative  Musculoskeletal: Negative  Skin: Negative  Neurological: Negative  Psychiatric/Behavioral: Negative for agitation, hallucinations and suicidal ideas  The patient is nervous/anxious  Objective:      /74 (BP Location: Left arm, Patient Position: Sitting, Cuff Size: Standard)   Pulse 77   Temp 98 3 °F (36 8 °C) (Oral)   Ht 5' 4" (1 626 m)   Wt 64 4 kg (142 lb)   SpO2 96%   BMI 24 37 kg/m²          Physical Exam   Constitutional: She is oriented to person, place, and time  She appears well-developed and well-nourished  HENT:   Head: Normocephalic and atraumatic  Right Ear: External ear normal    Left Ear: External ear normal    Nose: Nose normal    Mouth/Throat: Oropharynx is clear and moist    Eyes: Pupils are equal, round, and reactive to light  Conjunctivae and EOM are normal    Neck: Normal range of motion  Neck supple  Cardiovascular: Normal rate, regular rhythm, normal heart sounds and intact distal pulses  Pulmonary/Chest: Effort normal and breath sounds normal    Abdominal: Soft  Bowel sounds are normal    Neurological: She is alert and oriented to person, place, and time  Skin: Skin is warm and dry  Psychiatric: She has a normal mood and affect   Her behavior is normal  Judgment and thought content normal

## 2018-11-14 NOTE — PATIENT INSTRUCTIONS
Needs outside activities, part time job  Need to off set home situation  There is no physical abuse  DC TV news, stick with weather channel  Pleasant music  Reviewed common side effects of Effexor  Ativan only as needed  25 minutes spent patient, > 50 % of time spent counseling and coordination of care

## 2018-11-19 ENCOUNTER — OFFICE VISIT (OUTPATIENT)
Dept: UROLOGY | Facility: CLINIC | Age: 73
End: 2018-11-19
Payer: MEDICARE

## 2018-11-19 VITALS
HEIGHT: 64 IN | SYSTOLIC BLOOD PRESSURE: 130 MMHG | DIASTOLIC BLOOD PRESSURE: 74 MMHG | HEART RATE: 76 BPM | BODY MASS INDEX: 23.9 KG/M2 | WEIGHT: 140 LBS

## 2018-11-19 DIAGNOSIS — N31.9 NEUROMUSCULAR DYSFUNCTION OF BLADDER: Primary | ICD-10-CM

## 2018-11-19 PROCEDURE — 99213 OFFICE O/P EST LOW 20 MIN: CPT | Performed by: PHYSICIAN ASSISTANT

## 2018-11-19 NOTE — PROGRESS NOTES
UROLOGY PROGRESS NOTE   Patient Identifiers: Lori Banks (MRN [id number])  Date of Service: 11/19/2018    Subjective:     13-year-old female with history of pelvic floor dysfunction  She had been on MRI on Elavil both of which she stopped due to interaction with tamoxifen  Currently she is on Ativan and Effexor for panic attacks  This seems to be fairly effective for her pelvic floor dysfunction as well  Urine is clear  She does not get urinary tract infections  No other complaints  Patient has  no complaints  Objective:     VITALS:    Vitals:    11/19/18 1437   BP: 130/74   Pulse: 76           LABS:  Lab Results   Component Value Date    HGB 14 8 07/01/2015    HCT 44 5 07/01/2015    WBC 6 90 07/01/2015     07/01/2015   ]    Lab Results   Component Value Date     07/01/2015    K 3 9 01/29/2018     (H) 01/29/2018    CO2 26 01/29/2018    BUN 11 01/29/2018    CREATININE 0 78 01/29/2018    CALCIUM 8 4 01/29/2018    GLUCOSE 97 07/01/2015   ]        INPATIENT MEDS:    Current Outpatient Prescriptions:     alendronate (FOSAMAX) 70 mg tablet, Take 70 mg by mouth Once a week, Disp: , Rfl:     amLODIPine (NORVASC) 10 mg tablet, Take 1 tablet (10 mg total) by mouth daily, Disp: 90 tablet, Rfl: 1    aspirin (ECOTRIN LOW STRENGTH) 81 mg EC tablet, Take 81 mg by mouth daily  , Disp: , Rfl:     benazepril (LOTENSIN) 10 mg tablet, Take 1 tablet (10 mg total) by mouth daily for 180 days, Disp: 90 tablet, Rfl: 1    cholecalciferol (VITAMIN D3) 1,000 units tablet, Take 1,000 Units by mouth daily  , Disp: , Rfl:     LORazepam (ATIVAN) 0 5 mg tablet, Take 1 tablet (0 5 mg total) by mouth 2 (two) times a day as needed for anxiety for up to 30 days, Disp: 30 tablet, Rfl: 3    NON FORMULARY, NeuRx - TF - 1 Tab   Po qd, Disp: , Rfl:     simvastatin (ZOCOR) 20 mg tablet, Take 1 tablet (20 mg total) by mouth daily at bedtime, Disp: 90 tablet, Rfl: 1    tamoxifen (NOLVADEX) 20 mg tablet, Take 20 mg by mouth daily  , Disp: , Rfl:     venlafaxine (EFFEXOR-XR) 75 mg 24 hr capsule, Take 1 capsule (75 mg total) by mouth daily, Disp: 30 capsule, Rfl: 3    NON FORMULARY, , Disp: , Rfl:       Physical Exam:   /74 (BP Location: Left arm, Patient Position: Sitting, Cuff Size: Adult)   Pulse 76   Ht 5' 4" (1 626 m)   Wt 63 5 kg (140 lb)   BMI 24 03 kg/m²   GEN: no acute distress    RESP: breathing comfortably with no accessory muscle use    ABD: soft, non-tender, non-distended   INCISION:    EXT: no significant peripheral edema   (FeMale): Pelvic: external genitalia normal, urethra without abnormality or discharge, no bladder tenderness, rectovaginal septum normal, positive findings: vaginal discharge: white,   There is moderate atrophy  No caruncle no cystocele and urethral meatus    RADIOLOGY:    none     Assessment:    1   Pelvic floor dysfunction     Plan:   - she will continue her current medications  - follow-up in 1 year for her annual exam  -  -

## 2018-11-30 DIAGNOSIS — F41.9 ANXIETY: ICD-10-CM

## 2018-11-30 RX ORDER — LORAZEPAM 0.5 MG/1
TABLET ORAL
Qty: 30 TABLET | Refills: 1 | OUTPATIENT
Start: 2018-11-30

## 2018-12-26 ENCOUNTER — OFFICE VISIT (OUTPATIENT)
Dept: FAMILY MEDICINE CLINIC | Facility: CLINIC | Age: 73
End: 2018-12-26
Payer: MEDICARE

## 2018-12-26 VITALS
BODY MASS INDEX: 23.87 KG/M2 | HEIGHT: 64 IN | TEMPERATURE: 99 F | HEART RATE: 78 BPM | SYSTOLIC BLOOD PRESSURE: 124 MMHG | WEIGHT: 139.8 LBS | OXYGEN SATURATION: 97 % | DIASTOLIC BLOOD PRESSURE: 62 MMHG

## 2018-12-26 DIAGNOSIS — I10 ESSENTIAL HYPERTENSION: ICD-10-CM

## 2018-12-26 DIAGNOSIS — Z00.00 MEDICARE ANNUAL WELLNESS VISIT, SUBSEQUENT: Primary | ICD-10-CM

## 2018-12-26 DIAGNOSIS — M81.0 AGE-RELATED OSTEOPOROSIS WITHOUT CURRENT PATHOLOGICAL FRACTURE: ICD-10-CM

## 2018-12-26 DIAGNOSIS — F41.9 ANXIETY: ICD-10-CM

## 2018-12-26 DIAGNOSIS — E78.00 HYPERCHOLESTEREMIA: ICD-10-CM

## 2018-12-26 PROCEDURE — 99214 OFFICE O/P EST MOD 30 MIN: CPT | Performed by: FAMILY MEDICINE

## 2018-12-26 PROCEDURE — G0439 PPPS, SUBSEQ VISIT: HCPCS | Performed by: FAMILY MEDICINE

## 2018-12-26 RX ORDER — SIMVASTATIN 20 MG
20 TABLET ORAL
Qty: 90 TABLET | Refills: 1 | Status: SHIPPED | OUTPATIENT
Start: 2018-12-26 | End: 2019-07-01 | Stop reason: SDUPTHER

## 2018-12-26 RX ORDER — AMLODIPINE BESYLATE 10 MG/1
10 TABLET ORAL DAILY
Qty: 90 TABLET | Refills: 1 | Status: SHIPPED | OUTPATIENT
Start: 2018-12-26 | End: 2019-06-21 | Stop reason: SDUPTHER

## 2018-12-26 NOTE — PROGRESS NOTES
Assessment/Plan: pt here today for follow up for anxiety     No problem-specific Assessment & Plan notes found for this encounter  Diagnoses and all orders for this visit:    Medicare annual wellness visit, subsequent    Essential hypertension  -     amLODIPine (NORVASC) 10 mg tablet; Take 1 tablet (10 mg total) by mouth daily for 180 days    Hypercholesteremia  -     simvastatin (ZOCOR) 20 mg tablet; Take 1 tablet (20 mg total) by mouth daily at bedtime for 180 days    Anxiety    Age-related osteoporosis without current pathological fracture          Subjective:      Patient ID: Td Espana is a 68 y o  female  Follow up  Doing much better without Effexor, only took for 2 weeks  Handeling home situation much better  Continues treatment for osteoporosis and scheduled for repeat DEXA scan  Continues with same BP Med and uses the Lorazepam sparingly  Wants to get a part time job          The following portions of the patient's history were reviewed and updated as appropriate: allergies, current medications, past family history, past medical history, past social history, past surgical history and problem list     Current Outpatient Prescriptions:     alendronate (FOSAMAX) 70 mg tablet, Take 70 mg by mouth Once a week, Disp: , Rfl:     aspirin (ECOTRIN LOW STRENGTH) 81 mg EC tablet, Take 81 mg by mouth daily  , Disp: , Rfl:     benazepril (LOTENSIN) 10 mg tablet, Take 1 tablet (10 mg total) by mouth daily for 180 days, Disp: 90 tablet, Rfl: 1    cholecalciferol (VITAMIN D3) 1,000 units tablet, Take 1,000 Units by mouth daily  , Disp: , Rfl:     LORazepam (ATIVAN) 0 5 mg tablet, Take 1 tablet (0 5 mg total) by mouth 2 (two) times a day as needed for anxiety for up to 30 days, Disp: 30 tablet, Rfl: 3    NON FORMULARY, , Disp: , Rfl:     tamoxifen (NOLVADEX) 20 mg tablet, Take 20 mg by mouth daily  , Disp: , Rfl:     amLODIPine (NORVASC) 10 mg tablet, Take 1 tablet (10 mg total) by mouth daily for 180 days, Disp: 90 tablet, Rfl: 1    simvastatin (ZOCOR) 20 mg tablet, Take 1 tablet (20 mg total) by mouth daily at bedtime for 180 days, Disp: 90 tablet, Rfl: 1    Review of Systems   Constitutional: Negative  HENT: Negative  Eyes: Negative  Respiratory: Negative  Cardiovascular: Negative  Gastrointestinal: Negative  Negative for bowel incontinence  Genitourinary: Negative  Musculoskeletal: Negative  Skin: Negative  Neurological: Negative  Psychiatric/Behavioral: The patient is nervous/anxious  Objective:      /62 (BP Location: Left arm, Patient Position: Sitting, Cuff Size: Standard)   Pulse 78   Temp 99 °F (37 2 °C) (Oral)   Ht 5' 4" (1 626 m)   Wt 63 4 kg (139 lb 12 8 oz)   SpO2 97%   BMI 24 00 kg/m²          Physical Exam   Constitutional: She is oriented to person, place, and time  She appears well-developed and well-nourished  HENT:   Head: Normocephalic and atraumatic  Right Ear: External ear normal    Left Ear: External ear normal    Nose: Nose normal    Mouth/Throat: Oropharynx is clear and moist    Cardiovascular: Normal rate, regular rhythm and normal heart sounds  Pulmonary/Chest: Effort normal and breath sounds normal    Abdominal: Soft  Bowel sounds are normal    Neurological: She is alert and oriented to person, place, and time  Skin: Skin is warm and dry  Psychiatric: She has a normal mood and affect   Her behavior is normal  Judgment and thought content normal          Assessment and Plan:    Problem List Items Addressed This Visit     Essential hypertension    Relevant Medications    amLODIPine (NORVASC) 10 mg tablet      Other Visit Diagnoses     Medicare annual wellness visit, subsequent    -  Primary    Hypercholesteremia        Relevant Medications    simvastatin (ZOCOR) 20 mg tablet    Anxiety        Age-related osteoporosis without current pathological fracture            Health Maintenance Due   Topic Date Due    Hepatitis C Screening  1945   [place]  Medicare Annual Wellness Visit (AWV)  63/78/0849    SLP PLAN OF CARE  1945    DTaP,Tdap,and Td Vaccines (1 - Tdap) 08/04/1967    Pneumococcal PPSV23/PCV13 65+ Years / High and Highest Risk (1 of 2 - PCV13) 08/04/2011    MAMMOGRAM  09/06/2017    INFLUENZA VACCINE  07/01/2018         HPI:  Emmett Aviles is a 68 y o  female here for her Subsequent Wellness Visit      Patient Active Problem List   Diagnosis    Contusion of left elbow    Essential hypertension     Past Medical History:   Diagnosis Date    Allergic     Anxiety     Arthritis     Bulging discs     Cancer ([address] ) Breast    Chronic interstitial cystitis     Elevated amylase     Last Assessed:  3/24/15    GERD (gastroesophageal reflux disease)     Hyperlipidemia     Hypertension     Irritable bowel syndrome     Psychiatric disorder     Serum lipase elevation     Last Assessed:  9/17/13    Vitamin D deficiency      Past Surgical History:   Procedure Laterality Date    BREAST LUMPECTOMY Right 10/2016    CHOLECYSTECTOMY      TONSILLECTOMY      TOOTH EXTRACTION       Family History   Problem Relation Age of Onset    Hypertension Mother     Heart disease Father     Hypertension Father     Stroke Father         Syndrome     History   Smoking Status    Former Smoker    Types: Cigarettes    Quit date: 1998   Smokeless Tobacco    Never Used     History   Alcohol Use    Yes     Comment: social      History   Drug Use No       Current Outpatient Prescriptions   Medication Sig Dispense Refill    alendronate (FOSAMAX) 70 mg tablet Take 70 mg by mouth Once a week      aspirin (ECOTRIN LOW STRENGTH) 81 mg EC tablet Take 81 mg by mouth daily        benazepril (LOTENSIN) 10 mg tablet Take 1 tablet (10 mg total) by mouth daily for 180 days 90 tablet 1    cholecalciferol (VITAMIN D3) 1,000 units tablet Take 1,000 Units by mouth daily        LORazepam (ATIVAN) 0 5 mg tablet Take 1 tablet (0 5 mg total) by mouth 2 (two) times a day as needed for anxiety for up to 30 days 30 tablet 3    NON FORMULARY       tamoxifen (NOLVADEX) 20 mg tablet Take 20 mg by mouth daily        amLODIPine (NORVASC) 10 mg tablet Take 1 tablet (10 mg total) by mouth daily for 180 days 90 tablet 1    simvastatin (ZOCOR) 20 mg tablet Take 1 tablet (20 mg total) by mouth daily at bedtime for 180 days 90 tablet 1     No current facility-administered medications for this visit  Allergies   Allergen Reactions    Bactrim [Sulfamethoxazole-Trimethoprim]     Lyrica [Pregabalin]     Pneumovax 23 [Pneumococcal Vac Polyvalent]     Pollen Extract     Zithromax [Azithromycin]      Immunization History   Administered Date(s) Administered    Influenza 12/09/2014, 10/05/2015, 10/05/2015, 10/04/2016    Influenza Split High Dose Preservative Free IM 10/04/2016    Rabies, Intramuscular 03/17/2016, 03/20/2016       Patient Care Team:  Lilly Berrios DO as PCP - DO Magaly Shirley MD Sonda Alm, MD    Medicare Screening Tests and Risk Assessments:  Elisabeth Pereira is here for her Subsequent Wellness visit  Health Risk Assessment:  Patient rates overall health as good  Patient feels that their physical health rating is Same  Eyesight was rated as Same  Hearing was rated as Same  Patient feels that their emotional and mental health rating is Slightly better  Pain experienced by patient in the last 7 days has been None  Patient states that she has experienced no weight loss or gain in last 6 months  Emotional/Mental Health:  Patient has been feeling nervous/anxious  PHQ-9 Depression Screening:    Frequency of the following problems over the past two weeks:      1  Little interest or pleasure in doing things: 0 - not at all      2  Feeling down, depressed, or hopeless: 0 - not at all  PHQ-2 Score: 0          Broken Bones/Falls:     Fall Risk Assessment:    In the past year, patient has experienced: No history of falling in past year          Bladder/Bowel:  Patient has not leaked urine accidently in the last six months  Patient reports no loss of bowel control  Immunizations:  Patient has had a flu vaccination within the last year  Patient has not received a pneumonia shot  Patient has not received a shingles shot  Patient has not received tetanus/diphtheria shot  Home Safety:  Patient does not have trouble with stairs inside or outside of their home  Patient currently reports that there are no safety hazards present in home, working smoke alarms, working carbon monoxide detectors  Preventative Screenings:   Breast cancer screening performed, no colon cancer screen completed, no cholesterol screen completed, no glaucoma eye exam completed    Nutrition:  Current diet: Regular and Low Carb with servings of the following:    Medications:  Patient is currently taking over-the-counter supplements  List of OTC medications includes: hempoil  Patient is able to manage medications  Lifestyle Choices:  Patient reports no tobacco use  Patient has smoked or used tobacco in the past   Patient has stopped her tobacco use  Tobacco use quit date: 15 years ago   Patient reports alcohol use  Alcohol use per week: 3   Patient drives a vehicle  Patient wears seat belt  Current level of exercise of physical activity described by patient as: daily walking   Activities of Daily Living:  Can get out of bed by his or her self, able to dress self, able to make own meals, able to do own shopping, able to bathe self, can do own laundry/housekeeping, can manage own money, pay bills and track expenses    Previous Hospitalizations:  No hospitalization or ED visit in past 12 months        Advanced Directives:  Patient has decided on a power of   Patient has spoken to designated power of   Patient has not completed advanced directive          Preventative Screening/Counseling:      Cardiovascular:      General: Screening Current          Diabetes:      General: Screening Current          Colorectal Cancer:      General: Screening Current          Breast Cancer:      General: Screening Current          Cervical Cancer:      General: Screening Current          Osteoporosis:      General: Screening Current          AAA:      General: Screening Not Indicated          Glaucoma:      General: Screening Current          HIV:      General: Screening Not Indicated          Hepatitis C:      General: Screening Not Indicated        Advanced Directives:   Patient has no living will for healthcare, does not have durable POA for healthcare, patient does not have an advanced directive

## 2019-01-23 ENCOUNTER — APPOINTMENT (OUTPATIENT)
Dept: RADIOLOGY | Age: 74
End: 2019-01-23
Attending: PHYSICIAN ASSISTANT
Payer: MEDICARE

## 2019-01-23 ENCOUNTER — OFFICE VISIT (OUTPATIENT)
Dept: URGENT CARE | Age: 74
End: 2019-01-23
Payer: MEDICARE

## 2019-01-23 ENCOUNTER — TRANSCRIBE ORDERS (OUTPATIENT)
Dept: URGENT CARE | Age: 74
End: 2019-01-23

## 2019-01-23 ENCOUNTER — TELEPHONE (OUTPATIENT)
Dept: URGENT CARE | Age: 74
End: 2019-01-23

## 2019-01-23 VITALS
OXYGEN SATURATION: 98 % | TEMPERATURE: 98 F | RESPIRATION RATE: 18 BRPM | DIASTOLIC BLOOD PRESSURE: 60 MMHG | SYSTOLIC BLOOD PRESSURE: 132 MMHG | WEIGHT: 140 LBS | HEART RATE: 75 BPM | HEIGHT: 64 IN | BODY MASS INDEX: 23.9 KG/M2

## 2019-01-23 DIAGNOSIS — M25.551 HIP PAIN, RIGHT: ICD-10-CM

## 2019-01-23 DIAGNOSIS — S22.31XA CLOSED FRACTURE OF ONE RIB OF RIGHT SIDE, INITIAL ENCOUNTER: ICD-10-CM

## 2019-01-23 DIAGNOSIS — T14.90XA INJURY: Primary | ICD-10-CM

## 2019-01-23 DIAGNOSIS — R07.81 RIB PAIN ON RIGHT SIDE: ICD-10-CM
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
## 2019-01-23 DIAGNOSIS — T14.90XA INJURY: ICD-10-CM

## 2019-01-23 PROCEDURE — 71101 X-RAY EXAM UNILAT RIBS/CHEST: CPT

## 2019-01-23 PROCEDURE — 73502 X-RAY EXAM HIP UNI 2-3 VIEWS: CPT

## 2019-01-23 PROCEDURE — 99214 OFFICE O/P EST MOD 30 MIN: CPT | Performed by: FAMILY MEDICINE

## 2019-01-23 PROCEDURE — G0463 HOSPITAL OUTPT CLINIC VISIT: HCPCS | Performed by: FAMILY MEDICINE

## 2019-01-23 RX ORDER — LIDOCAINE 50 MG/G
1 PATCH TOPICAL DAILY
Qty: 30 PATCH | Refills: 0 | Status: SHIPPED | OUTPATIENT
Start: 2019-01-23 | End: 2019-01-23 | Stop reason: RX

## 2019-01-23 RX ORDER — HYDROCODONE BITARTRATE AND ACETAMINOPHEN 5; 325 MG/1; MG/1
1 TABLET ORAL EVERY 6 HOURS PRN
Qty: 12 TABLET | Refills: 0 | Status: SHIPPED | OUTPATIENT
Start: 2019-01-23 | End: 2019-04-25

## 2019-01-23 NOTE — PATIENT INSTRUCTIONS
Use pain patches as needed for rib and hip pain  Prescription for incentive spirometer given to help with expanding lungs  Call primary care doctor's office today to arrange follow-up for the next 3-5 days  If you develop any significant chest pain or shortness of breath proceed to emergency room for further evaluation

## 2019-01-23 NOTE — PROGRESS NOTES
[facility]    NAME: Alycia Magallanes is a 68 y o  female  : 1945    MRN: [id number]  DATE: 2019  TIME: 12:59 PM    Assessment and Plan   Fall, initial encounter [W19  XXXA]  1  Fall, initial encounter  HYDROcodone-acetaminophen (NORCO) 5-325 mg per tablet   2  Closed fracture of one rib of right side, initial encounter  DISCONTINUED: lidocaine (LIDODERM) 5 %   3  Hip pain, right  HYDROcodone-acetaminophen (NORCO) 5-325 mg per tablet    DISCONTINUED: lidocaine (LIDODERM) 5 %   4  Rib pain on right side  HYDROcodone-acetaminophen (NORCO) 5-325 mg per tablet     Right rib series:  Possible right rib fracture  Await radiologist's final test results  Right hip and pelvis:  No acute bony changes  Await radiologist's final test results  Addendum:  Lidoderm not covered w/o referral   Change to [address] pt not to take along with Lorazepam   She expressed understanding  [facility] narcotic website consulted  *Pt currently has Rx for narcotic or other controlled substances  (#30 Lorazepam 0 5 monthly to use prn anxiety)  Small Rx of Norco given with appropriate warning  Patient Instructions   Patient Instructions   Use pain patches as needed for rib and hip pain  Prescription for incentive spirometer given to help with expanding lungs  Call primary care doctor's office today to arrange follow-up for the next 3-5 days  If you develop any significant chest pain or shortness of breath proceed to emergency room for further evaluation  Chief Complaint     Chief Complaint   Patient presents with    Fall     last night on ice in driveway, c/o right rib pain, denies LOC ,     Hip Pain     right hip pain    Shoulder Pain       History of Present Illness   Alycia Magallanes presents to the clinic c/o  67-year-old female who fell walking up her driveway yesterday landing on a small ice [place]   Hit her right side ribcage area and right hip    Hurts to take a deep breath  Hurts to cough and sneeze  Right hip and pelvis area is tender  Hurts a little bit to walk  Denies prior history of injury to her right hip for ribcage  Took some Tylenol last night  Did not use any ice  Review of Systems   Review of Systems   Constitutional: Negative  Respiratory: Negative  Cardiovascular: Positive for chest pain  Negative for palpitations and leg swelling  Chest pain with deep breath   Musculoskeletal: Positive for arthralgias, gait problem and myalgias  Skin: Positive for color change         Current Medications     Long-Term Prescriptions   Medication Sig Dispense Refill    alendronate (FOSAMAX) 70 mg tablet Take 70 mg by mouth Once a week      amLODIPine (NORVASC) 10 mg tablet Take 1 tablet (10 mg total) by mouth daily for 180 days 90 tablet 1    aspirin (ECOTRIN LOW STRENGTH) 81 mg EC tablet Take 81 mg by mouth daily        benazepril (LOTENSIN) 10 mg tablet Take 1 tablet (10 mg total) by mouth daily for 180 days 90 tablet 1    cholecalciferol (VITAMIN D3) 1,000 units tablet Take 1,000 Units by mouth daily        LORazepam (ATIVAN) 0 5 mg tablet Take 1 tablet (0 5 mg total) by mouth 2 (two) times a day as needed for anxiety for up to 30 days 30 tablet 3    simvastatin (ZOCOR) 20 mg tablet Take 1 tablet (20 mg total) by mouth daily at bedtime for 180 days 90 tablet 1    tamoxifen (NOLVADEX) 20 mg tablet Take 20 mg by mouth daily           Current Allergies     Allergies as of 01/23/2019 - Reviewed 01/23/2019   Allergen Reaction Noted    Bactrim [sulfamethoxazole-trimethoprim]  03/17/2016    Lyrica [pregabalin]  03/17/2016    Pneumovax 23 [pneumococcal vac polyvalent]  03/17/2016    Pollen extract  12/11/2012    Zithromax [azithromycin]  03/17/2016          The following portions of the patient's history were reviewed and updated as appropriate: allergies, current medications, past family history, past medical history, past social history, past surgical history and problem list   Past Medical History:   Diagnosis Date    Allergic     Anxiety     Arthritis     Bulging discs     Cancer ([address] ) Breast    Chronic interstitial cystitis     Elevated amylase     Last Assessed:  3/24/15    GERD (gastroesophageal reflux disease)     Hyperlipidemia     Hypertension     Irritable bowel syndrome     Psychiatric disorder     Serum lipase elevation     Last Assessed:  9/17/13    Vitamin D deficiency      Past Surgical History:   Procedure Laterality Date    BREAST LUMPECTOMY Right 10/2016    CHOLECYSTECTOMY      TONSILLECTOMY      TOOTH EXTRACTION       Family History   Problem Relation Age of Onset    Hypertension Mother     Heart disease Father     Hypertension Father     Stroke Father         Syndrome       Objective   /60   Pulse 75   Temp 98 °F (36 7 °C) (Temporal)   Resp 18   Ht 5' 4" (1 626 m)   Wt 63 5 kg (140 lb)   SpO2 98%   BMI 24 03 kg/m²   No LMP recorded  Physical Exam     Physical Exam   Constitutional: She is oriented to person, place, and time  She appears well-developed and well-nourished  No distress  Cardiovascular: Normal rate and regular rhythm  Pulmonary/Chest: Effort normal and breath sounds normal  She exhibits tenderness  Right chest wall tenderness to palpation under right breast fold that radiates around to posterior lateral aspect  No obvious bony deformities  Musculoskeletal:   Tender to palpation anterior iliac crest   Greater trochanter of hip nontender to palpation  Good internal external rotation right hip  Neurological: She is alert and oriented to person, place, and time  Skin: Skin is warm and dry  She is not diaphoretic  Contusions on palms of hands consistent from fall  No obvious contusion chest wall, shoulders, hips, buttocks or low back   Psychiatric: She has a normal mood and affect

## 2019-01-23 NOTE — TELEPHONE ENCOUNTER
Call to patient to discuss rib series x-ray results per radiologist no fractures were seen  This may be contusion of rib cage and discomfort may not last as long as if there is actual rib fracture  Do recommend she follow up with her family doctor as previously discussed  If pain is persisting, repeat x-ray may be warranted  Patient voices understanding

## 2019-01-29 ENCOUNTER — OFFICE VISIT (OUTPATIENT)
Dept: FAMILY MEDICINE CLINIC | Facility: CLINIC | Age: 74
End: 2019-01-29
Payer: MEDICARE

## 2019-01-29 VITALS
HEIGHT: 64 IN | TEMPERATURE: 98.3 F | DIASTOLIC BLOOD PRESSURE: 64 MMHG | HEART RATE: 72 BPM | SYSTOLIC BLOOD PRESSURE: 126 MMHG | WEIGHT: 141 LBS | OXYGEN SATURATION: 96 % | BODY MASS INDEX: 24.07 KG/M2

## 2019-01-29 DIAGNOSIS — M99.08 SEGMENTAL AND SOMATIC DYSFUNCTION OF RIB CAGE: ICD-10-CM

## 2019-01-29 DIAGNOSIS — R07.89 CHEST TIGHTNESS: ICD-10-CM

## 2019-01-29 DIAGNOSIS — M54.6 ACUTE RIGHT-SIDED THORACIC BACK PAIN: ICD-10-CM

## 2019-01-29 DIAGNOSIS — M99.02 SOMATIC DYSFUNCTION OF THORACIC REGION: ICD-10-CM

## 2019-01-29 DIAGNOSIS — S23.41XA RIB SPRAIN, INITIAL ENCOUNTER: ICD-10-CM

## 2019-01-29 DIAGNOSIS — S22.31XA CLOSED FRACTURE OF ONE RIB OF RIGHT SIDE, INITIAL ENCOUNTER: Primary | ICD-10-CM

## 2019-01-29 DIAGNOSIS — S29.012A STRAIN OF MUSCLE AND TENDON OF BACK WALL OF THORAX, INITIAL ENCOUNTER: ICD-10-CM

## 2019-01-29 PROCEDURE — 99214 OFFICE O/P EST MOD 30 MIN: CPT | Performed by: FAMILY MEDICINE

## 2019-01-29 PROCEDURE — 98926 OSTEOPATH MANJ 3-4 REGIONS: CPT | Performed by: FAMILY MEDICINE

## 2019-01-29 NOTE — PROGRESS NOTES
Assessment/Plan: pt here today for follow up for recent fall  Pt fell on 01/22/2019 on ice and broke rib and was seen in ER     No problem-specific Assessment & Plan notes found for this encounter  Diagnoses and all orders for this visit:    Closed fracture of one rib of right side, initial encounter    Chest tightness    Acute right-sided thoracic back pain    Rib sprain, initial encounter    Segmental and somatic dysfunction of rib cage    Somatic dysfunction of thoracic region    Strain of muscle and tendon of back wall of thorax, initial encounter          Subjective:      Patient ID: Alycia Magallanes is a 68 y o  female  Slipped and fell onto a block of ice one week ago  Fracture one rib per ER report, nondisplaced  Hurts to take deep breath or lean forward  Taking hot showers BID  Area very tender  Using Spirometer at home          The following portions of the patient's history were reviewed and updated as appropriate: allergies, current medications, past family history, past medical history, past social history, past surgical history and problem list     Current Outpatient Prescriptions:     amLODIPine (NORVASC) 10 mg tablet, Take 1 tablet (10 mg total) by mouth daily for 180 days, Disp: 90 tablet, Rfl: 1    aspirin (ECOTRIN LOW STRENGTH) 81 mg EC tablet, Take 81 mg by mouth daily  , Disp: , Rfl:     benazepril (LOTENSIN) 10 mg tablet, Take 1 tablet (10 mg total) by mouth daily for 180 days, Disp: 90 tablet, Rfl: 1    cholecalciferol (VITAMIN D3) 1,000 units tablet, Take 1,000 Units by mouth daily  , Disp: , Rfl:     LORazepam (ATIVAN) 0 5 mg tablet, Take 1 tablet (0 5 mg total) by mouth 2 (two) times a day as needed for anxiety for up to 30 days, Disp: 30 tablet, Rfl: 3    NON FORMULARY, , Disp: , Rfl:     simvastatin (ZOCOR) 20 mg tablet, Take 1 tablet (20 mg total) by mouth daily at bedtime for 180 days, Disp: 90 tablet, Rfl: 1    tamoxifen (NOLVADEX) 20 mg tablet, Take 20 mg by mouth daily  , Disp: , Rfl:     alendronate (FOSAMAX) 70 mg tablet, Take 70 mg by mouth Once a week, Disp: , Rfl:     HYDROcodone-acetaminophen (NORCO) 5-325 mg per tablet, Take 1 tablet by mouth every 6 (six) hours as needed for pain Max Daily Amount: 4 tablets (Patient not taking: Reported on 1/29/2019 ), Disp: 12 tablet, Rfl: 0    Review of Systems   Constitutional: Negative  HENT: Negative  Eyes: Negative  Respiratory: Positive for chest tightness  Hurts to take a deep breath  Cardiovascular: Negative  Gastrointestinal: Negative  Genitourinary: Negative  Musculoskeletal:        Right ribs below breast, very tender, difficult to take deep breath  Skin: Negative  Neurological: Negative  Psychiatric/Behavioral: The patient is nervous/anxious  Afraid of getting pneumonia  Objective:      /64 (BP Location: Left arm, Patient Position: Sitting, Cuff Size: Standard)   Pulse 72   Temp 98 3 °F (36 8 °C) (Oral)   Ht 5' 4" (1 626 m)   Wt 64 kg (141 lb)   SpO2 96%   BMI 24 20 kg/m²          Physical Exam   Constitutional: She is oriented to person, place, and time  She appears well-developed and well-nourished  HENT:   Head: Normocephalic and atraumatic  Mouth/Throat: Oropharynx is clear and moist    Cardiovascular: Normal rate, regular rhythm and normal heart sounds  Pulmonary/Chest: Breath sounds normal  No respiratory distress  She has no wheezes  She has no rales  She exhibits tenderness  Taking breath 1/2 way because of pain  Musculoskeletal:   Very tender rib IS's below right breast, no skin discoloration  TV posterior restricted, T4 thru T7 and tender  Rib restricted in exhalation  Neurological: She is alert and oriented to person, place, and time  Skin: Skin is warm and dry  Psychiatric: She has a normal mood and affect   Her behavior is normal  Judgment and thought content normal

## 2019-01-29 NOTE — PROGRESS NOTES
OMT  Performed by: Edmund Colbert  Authorized by: Edmund Colbert     Verbal consent obtained?: Yes    Risks and benefits: Risks, benefits and alternatives were discussed    Consent given by:  Patient  Patient states understanding of procedure being performed: Yes    Patient's understanding of procedure matches consent: Yes    Procedure Details:     Region evaluated and treated:  Thoracic T1 - T4, Thoracic T5 - T9 and Ribs    Thoracic T1 - T4 details:     Examination Method:  Asymmetry, Misalignment, Crepitation, Defects, Masses, Tenderness, Pain and Tissue Texture Change, Stability, Laxity, Effusions, Tone    Severity:  Mild    Treatment Method:  High Velocity, Low Amplitude Treatment and Soft Tissue Treatment    Response:  Resolved    Thoracic T5 - T9 details:     Examination Method:  Asymmetry, Misalignment, Crepitation, Defects, Masses, Tissue Texture Change, Stability, Laxity, Effusions, Tone and Tenderness, Pain    Severity:  Mild    Treatment Method:  High Velocity, Low Amplitude Treatment and Soft Tissue Treatment    Response:  Resolved - The somatic dysfunction is completely resolved without evidence of it ever having been present  Ribs details:     Examination Method:  Asymmetry, Misalignment, Crepitation, Defects, Masses, Range of Motion, Contracture, Tenderness, Pain and Tissue Texture Change, Stability, Laxity, Effusions, Tone    Severity:  Moderate    Osteopathic Findings:  Recheck, residual discomfort, can take deep breath and lean forward with minimal discomfort  Patient to DC heat  Treatment Method:  Myofascial Release Treatment and Soft Tissue Treatment    Response:  Improved - The somatic dysfunction is improved but not completely resolved      Total Regions Treated:  3

## 2019-02-05 ENCOUNTER — OFFICE VISIT (OUTPATIENT)
Dept: FAMILY MEDICINE CLINIC | Facility: CLINIC | Age: 74
End: 2019-02-05
Payer: MEDICARE

## 2019-02-05 VITALS
HEART RATE: 76 BPM | SYSTOLIC BLOOD PRESSURE: 126 MMHG | WEIGHT: 142.4 LBS | TEMPERATURE: 98.2 F | DIASTOLIC BLOOD PRESSURE: 74 MMHG | OXYGEN SATURATION: 97 % | HEIGHT: 64 IN | BODY MASS INDEX: 24.31 KG/M2

## 2019-02-05 DIAGNOSIS — M99.08 SEGMENTAL AND SOMATIC DYSFUNCTION OF RIB CAGE: ICD-10-CM

## 2019-02-05 DIAGNOSIS — M54.9 UPPER BACK PAIN ON RIGHT SIDE: ICD-10-CM

## 2019-02-05 DIAGNOSIS — S29.012D STRAIN OF MUSCLE AND TENDON OF BACK WALL OF THORAX, SUBSEQUENT ENCOUNTER: ICD-10-CM

## 2019-02-05 DIAGNOSIS — M99.02 SOMATIC DYSFUNCTION OF THORACIC REGION: ICD-10-CM

## 2019-02-05 DIAGNOSIS — R07.81 RIB PAIN ON RIGHT SIDE: Primary | ICD-10-CM

## 2019-02-05 DIAGNOSIS — S23.41XD RIB SPRAIN, SUBSEQUENT ENCOUNTER: ICD-10-CM

## 2019-02-05 PROCEDURE — 98926 OSTEOPATH MANJ 3-4 REGIONS: CPT | Performed by: FAMILY MEDICINE

## 2019-02-05 NOTE — PROGRESS NOTES
Assessment/Plan: pt here today for follow up for rib pain     No problem-specific Assessment & Plan notes found for this encounter  Diagnoses and all orders for this visit:    Rib pain on right side    Upper back pain on right side    Rib sprain, subsequent encounter    Segmental and somatic dysfunction of rib cage    Somatic dysfunction of thoracic region    Strain of muscle and tendon of back wall of thorax, subsequent encounter          Subjective:      Patient ID: Lucia Sheppard is a 68 y o  female  Some of the pain returned couple days after last visit, bending and twisting picking up laundry          The following portions of the patient's history were reviewed and updated as appropriate: allergies, current medications, past family history, past medical history, past social history, past surgical history and problem list     Current Outpatient Prescriptions:     alendronate (FOSAMAX) 70 mg tablet, Take 70 mg by mouth Once a week, Disp: , Rfl:     amLODIPine (NORVASC) 10 mg tablet, Take 1 tablet (10 mg total) by mouth daily for 180 days, Disp: 90 tablet, Rfl: 1    aspirin (ECOTRIN LOW STRENGTH) 81 mg EC tablet, Take 81 mg by mouth daily  , Disp: , Rfl:     benazepril (LOTENSIN) 10 mg tablet, Take 1 tablet (10 mg total) by mouth daily for 180 days, Disp: 90 tablet, Rfl: 1    cholecalciferol (VITAMIN D3) 1,000 units tablet, Take 1,000 Units by mouth daily  , Disp: , Rfl:     HYDROcodone-acetaminophen (NORCO) 5-325 mg per tablet, Take 1 tablet by mouth every 6 (six) hours as needed for pain Max Daily Amount: 4 tablets, Disp: 12 tablet, Rfl: 0    LORazepam (ATIVAN) 0 5 mg tablet, Take 1 tablet (0 5 mg total) by mouth 2 (two) times a day as needed for anxiety for up to 30 days, Disp: 30 tablet, Rfl: 3    NON FORMULARY, , Disp: , Rfl:     simvastatin (ZOCOR) 20 mg tablet, Take 1 tablet (20 mg total) by mouth daily at bedtime for 180 days, Disp: 90 tablet, Rfl: 1    tamoxifen (NOLVADEX) 20 mg tablet, Take 20 mg by mouth daily  , Disp: , Rfl:      Allergies   Allergen Reactions    Bactrim [Sulfamethoxazole-Trimethoprim]     Lyrica [Pregabalin]     Pneumovax 23 [Pneumococcal Vac Polyvalent]     Pollen Extract     Zithromax [Azithromycin]        Review of Systems   Constitutional: Negative  HENT: Negative  Respiratory: Positive for chest tightness  Negative for cough and wheezing  Cardiovascular: Negative  Gastrointestinal: Negative  Skin: Negative  Neurological: Negative  Objective:      /74 (BP Location: Left arm, Patient Position: Sitting, Cuff Size: Standard)   Pulse 76   Temp 98 2 °F (36 8 °C) (Oral)   Ht 5' 4" (1 626 m)   Wt 64 6 kg (142 lb 6 4 oz)   SpO2 97%   BMI 24 44 kg/m²          Physical Exam   Constitutional: She is oriented to person, place, and time  She appears well-developed and well-nourished  HENT:   Head: Normocephalic and atraumatic  Cardiovascular: Normal rate, regular rhythm and normal heart sounds  Pulmonary/Chest: Effort normal and breath sounds normal  No respiratory distress  She has no wheezes  She has no rales  She exhibits tenderness  Good breath sounds, with good inspiratory effort  Musculoskeletal:   TV foldings T4 to T8  Rib Tenderness under right breast    Neurological: She is alert and oriented to person, place, and time  Skin: Skin is warm and dry  Psychiatric: She has a normal mood and affect   Her behavior is normal  Judgment and thought content normal    Little anxious

## 2019-02-06 NOTE — PROGRESS NOTES
OMT  Performed by: Venkat Quiroz  Authorized by: Venkat Quiroz     Verbal consent obtained?: Yes    Risks and benefits: Risks, benefits and alternatives were discussed    Consent given by:  Patient  Patient states understanding of procedure being performed: Yes    Patient's understanding of procedure matches consent: Yes    Procedure Details:     Region evaluated and treated:  Thoracic T1 - T4, Thoracic T5 - T9 and Ribs    Thoracic T1 - T4 details:     Examination Method:  Asymmetry, Misalignment, Crepitation, Defects, Masses and Tenderness, Pain    Severity:  Mild    Treatment Method:  High Velocity, Low Amplitude Treatment and Soft Tissue Treatment    Response:  Resolved    Thoracic T5 - T9 details:     Examination Method:  Asymmetry, Misalignment, Crepitation, Defects, Masses and Tenderness, Pain    Severity:  Mild    Treatment Method:  Soft Tissue Treatment and High Velocity, Low Amplitude Treatment    Response:  Resolved - The somatic dysfunction is completely resolved without evidence of it ever having been present  Ribs details:     Examination Method:  Tenderness, Pain    Severity:  Mild    Treatment Method:  Soft Tissue Treatment and Muscle Energy Treatment    Response:  Improved - The somatic dysfunction is improved but not completely resolved      Total Regions Treated:  3

## 2019-02-18 ENCOUNTER — OFFICE VISIT (OUTPATIENT)
Dept: FAMILY MEDICINE CLINIC | Facility: CLINIC | Age: 74
End: 2019-02-18
Payer: MEDICARE

## 2019-02-18 VITALS
HEART RATE: 77 BPM | WEIGHT: 140 LBS | HEIGHT: 64 IN | OXYGEN SATURATION: 96 % | BODY MASS INDEX: 23.9 KG/M2 | TEMPERATURE: 98 F | SYSTOLIC BLOOD PRESSURE: 126 MMHG | DIASTOLIC BLOOD PRESSURE: 68 MMHG

## 2019-02-18 DIAGNOSIS — I10 ESSENTIAL HYPERTENSION: ICD-10-CM

## 2019-02-18 DIAGNOSIS — S29.012D STRAIN OF MUSCLE AND TENDON OF BACK WALL OF THORAX, SUBSEQUENT ENCOUNTER: ICD-10-CM

## 2019-02-18 DIAGNOSIS — M99.02 SOMATIC DYSFUNCTION OF THORACIC REGION: ICD-10-CM

## 2019-02-18 DIAGNOSIS — R07.89 OTHER CHEST PAIN: Primary | ICD-10-CM

## 2019-02-18 DIAGNOSIS — F41.9 ANXIETY: ICD-10-CM

## 2019-02-18 DIAGNOSIS — M99.08 SEGMENTAL AND SOMATIC DYSFUNCTION OF RIB CAGE: ICD-10-CM

## 2019-02-18 DIAGNOSIS — S23.41XD RIB SPRAIN, SUBSEQUENT ENCOUNTER: ICD-10-CM

## 2019-02-18 DIAGNOSIS — M54.6 ACUTE RIGHT-SIDED THORACIC BACK PAIN: ICD-10-CM

## 2019-02-18 PROCEDURE — 99214 OFFICE O/P EST MOD 30 MIN: CPT | Performed by: FAMILY MEDICINE

## 2019-02-18 PROCEDURE — 98925 OSTEOPATH MANJ 1-2 REGIONS: CPT | Performed by: FAMILY MEDICINE

## 2019-02-18 NOTE — PROGRESS NOTES
Assessment/Plan: pt here today for follow up for rib pain     No problem-specific Assessment & Plan notes found for this encounter  Diagnoses and all orders for this visit:    Other chest pain    Acute right-sided thoracic back pain    Rib sprain, subsequent encounter    Segmental and somatic dysfunction of rib cage    Somatic dysfunction of thoracic region    Strain of muscle and tendon of back wall of thorax, subsequent encounter    Other orders  -     CALCIUM CITRATE PO; Take by mouth          Subjective:      Patient ID: Addis Roth is a 68 y o  female  Gets a little pain under right breast that travels around to posterior thorax  Breathin OK, can take deep breath without much difficulty  Stress/anxiety are decreasing  Continues with BP Med, no lightheaded or dizziness    PO intake is normal       The following portions of the patient's history were reviewed and updated as appropriate: allergies, current medications, past family history, past medical history, past social history, past surgical history and problem list     Current Outpatient Medications:     amLODIPine (NORVASC) 10 mg tablet, Take 1 tablet (10 mg total) by mouth daily for 180 days, Disp: 90 tablet, Rfl: 1    aspirin (ECOTRIN LOW STRENGTH) 81 mg EC tablet, Take 81 mg by mouth daily  , Disp: , Rfl:     benazepril (LOTENSIN) 10 mg tablet, Take 1 tablet (10 mg total) by mouth daily for 180 days, Disp: 90 tablet, Rfl: 1    CALCIUM CITRATE PO, Take by mouth, Disp: , Rfl:     cholecalciferol (VITAMIN D3) 1,000 units tablet, Take 1,000 Units by mouth daily  , Disp: , Rfl:     LORazepam (ATIVAN) 0 5 mg tablet, Take 1 tablet (0 5 mg total) by mouth 2 (two) times a day as needed for anxiety for up to 30 days, Disp: 30 tablet, Rfl: 3    NON FORMULARY, , Disp: , Rfl:     simvastatin (ZOCOR) 20 mg tablet, Take 1 tablet (20 mg total) by mouth daily at bedtime for 180 days, Disp: 90 tablet, Rfl: 1    tamoxifen (NOLVADEX) 20 mg tablet, Take 20 mg by mouth daily  , Disp: , Rfl:     HYDROcodone-acetaminophen (NORCO) 5-325 mg per tablet, Take 1 tablet by mouth every 6 (six) hours as needed for pain Max Daily Amount: 4 tablets (Patient not taking: Reported on 2/18/2019), Disp: 12 tablet, Rfl: 0     Allergies   Allergen Reactions    Bactrim [Sulfamethoxazole-Trimethoprim]     Lyrica [Pregabalin]     Pneumovax 23 [Pneumococcal Vac Polyvalent]     Pollen Extract     Zithromax [Azithromycin]        Review of Systems   Constitutional: Negative  HENT: Negative  Respiratory: Positive for chest tightness  Negative for shortness of breath and wheezing  Cardiovascular: Positive for chest pain  Non cardiac Chest pains  Gastrointestinal: Negative  Musculoskeletal: Positive for back pain  Skin: Negative  Psychiatric/Behavioral: The patient is nervous/anxious  Objective:      /68 (BP Location: Left arm, Patient Position: Sitting, Cuff Size: Standard)   Pulse 77   Temp 98 °F (36 7 °C) (Oral)   Ht 5' 4" (1 626 m)   Wt 63 5 kg (140 lb)   SpO2 96%   BMI 24 03 kg/m²          Physical Exam   Constitutional: She is oriented to person, place, and time  She appears well-developed and well-nourished  HENT:   Head: Normocephalic and atraumatic  Right Ear: External ear normal    Left Ear: External ear normal    Nose: Nose normal    Mouth/Throat: Oropharynx is clear and moist    Eyes: Pupils are equal, round, and reactive to light  Conjunctivae and EOM are normal    Cardiovascular: Normal rate, regular rhythm and normal heart sounds  Pulmonary/Chest: Effort normal and breath sounds normal    Musculoskeletal:   Tender anterior rib to that TV level  Neurological: She is alert and oriented to person, place, and time  She has normal reflexes  Skin: Skin is warm and dry  Psychiatric: She has a normal mood and affect   Her behavior is normal  Judgment and thought content normal

## 2019-02-19 PROBLEM — F32.0 CURRENT MILD EPISODE OF MAJOR DEPRESSIVE DISORDER WITHOUT PRIOR EPISODE (HCC): Status: ACTIVE | Noted: 2019-02-19

## 2019-02-19 NOTE — PROGRESS NOTES
OMT  Performed by: Garfield Hamm DO  Authorized by: Garfield Hamm, DO     Verbal consent obtained?: Yes    Risks and benefits: Risks, benefits and alternatives were discussed    Consent given by:  Patient  Patient states understanding of procedure being performed: Yes    Patient's understanding of procedure matches consent: Yes    Procedure Details:     Region evaluated and treated:  Thoracic T5 - T9 and Ribs    Thoracic T5 - T9 details:     Examination Method:  Asymmetry, Misalignment, Crepitation, Defects, Masses and Tenderness, Pain    Severity:  Mild    Treatment Method:  High Velocity, Low Amplitude Treatment and Soft Tissue Treatment    Response:  Resolved - The somatic dysfunction is completely resolved without evidence of it ever having been present  Ribs details:     Examination Method:  Tenderness, Pain    Severity:  Mild    Treatment Method:  Myofascial Release Treatment, Soft Tissue Treatment and Muscle Energy Treatment    Response:  Improved - The somatic dysfunction is improved but not completely resolved      Total Regions Treated:  2

## 2019-02-26 ENCOUNTER — APPOINTMENT (OUTPATIENT)
Dept: LAB | Age: 74
End: 2019-02-26
Payer: MEDICARE

## 2019-02-26 DIAGNOSIS — E55.9 VITAMIN D DEFICIENCY: ICD-10-CM

## 2019-02-26 DIAGNOSIS — I10 ESSENTIAL HYPERTENSION: ICD-10-CM

## 2019-02-26 LAB
25(OH)D3 SERPL-MCNC: 34.8 NG/ML (ref 30–100)
ALBUMIN SERPL BCP-MCNC: 4 G/DL (ref 3.5–5)
ALP SERPL-CCNC: 45 U/L (ref 46–116)
ALT SERPL W P-5'-P-CCNC: 22 U/L (ref 12–78)
ANION GAP SERPL CALCULATED.3IONS-SCNC: 3 MMOL/L (ref 4–13)
AST SERPL W P-5'-P-CCNC: 19 U/L (ref 5–45)
BILIRUB DIRECT SERPL-MCNC: 0.18 MG/DL (ref 0–0.2)
BILIRUB SERPL-MCNC: 0.78 MG/DL (ref 0.2–1)
BUN SERPL-MCNC: 14 MG/DL (ref 5–25)
CALCIUM SERPL-MCNC: 8.9 MG/DL (ref 8.3–10.1)
CHLORIDE SERPL-SCNC: 110 MMOL/L (ref 100–108)
CHOLEST SERPL-MCNC: 190 MG/DL (ref 50–200)
CO2 SERPL-SCNC: 27 MMOL/L (ref 21–32)
CREAT SERPL-MCNC: 0.7 MG/DL (ref 0.6–1.3)
ERYTHROCYTE [DISTWIDTH] IN BLOOD BY AUTOMATED COUNT: 12.4 % (ref 11.6–15.1)
GFR SERPL CREATININE-BSD FRML MDRD: 87 ML/MIN/1.73SQ M
GLUCOSE P FAST SERPL-MCNC: 101 MG/DL (ref 65–99)
HCT VFR BLD AUTO: 42.7 % (ref 34.8–46.1)
HDLC SERPL-MCNC: 51 MG/DL (ref 40–60)
HGB BLD-MCNC: 13.6 G/DL (ref 11.5–15.4)
LDLC SERPL CALC-MCNC: 96 MG/DL (ref 0–100)
MCH RBC QN AUTO: 29.6 PG (ref 26.8–34.3)
MCHC RBC AUTO-ENTMCNC: 31.9 G/DL (ref 31.4–37.4)
MCV RBC AUTO: 93 FL (ref 82–98)
NONHDLC SERPL-MCNC: 139 MG/DL
PLATELET # BLD AUTO: 231 THOUSANDS/UL (ref 149–390)
PMV BLD AUTO: 13 FL (ref 8.9–12.7)
POTASSIUM SERPL-SCNC: 3.9 MMOL/L (ref 3.5–5.3)
PROT SERPL-MCNC: 7.1 G/DL (ref 6.4–8.2)
RBC # BLD AUTO: 4.6 MILLION/UL (ref 3.81–5.12)
SODIUM SERPL-SCNC: 140 MMOL/L (ref 136–145)
TRIGL SERPL-MCNC: 213 MG/DL
WBC # BLD AUTO: 7.69 THOUSAND/UL (ref 4.31–10.16)

## 2019-02-26 PROCEDURE — 85027 COMPLETE CBC AUTOMATED: CPT

## 2019-02-26 PROCEDURE — 80061 LIPID PANEL: CPT

## 2019-02-26 PROCEDURE — 80048 BASIC METABOLIC PNL TOTAL CA: CPT

## 2019-02-26 PROCEDURE — 80076 HEPATIC FUNCTION PANEL: CPT

## 2019-02-26 PROCEDURE — 82306 VITAMIN D 25 HYDROXY: CPT

## 2019-02-26 PROCEDURE — 36415 COLL VENOUS BLD VENIPUNCTURE: CPT

## 2019-03-06 ENCOUNTER — OFFICE VISIT (OUTPATIENT)
Dept: FAMILY MEDICINE CLINIC | Facility: CLINIC | Age: 74
End: 2019-03-06
Payer: MEDICARE

## 2019-03-06 VITALS
BODY MASS INDEX: 24.11 KG/M2 | OXYGEN SATURATION: 97 % | TEMPERATURE: 98.1 F | HEIGHT: 64 IN | HEART RATE: 79 BPM | WEIGHT: 141.2 LBS | DIASTOLIC BLOOD PRESSURE: 72 MMHG | SYSTOLIC BLOOD PRESSURE: 128 MMHG

## 2019-03-06 DIAGNOSIS — F32.0 CURRENT MILD EPISODE OF MAJOR DEPRESSIVE DISORDER WITHOUT PRIOR EPISODE (HCC): ICD-10-CM

## 2019-03-06 DIAGNOSIS — I10 ESSENTIAL HYPERTENSION: ICD-10-CM

## 2019-03-06 PROCEDURE — 99213 OFFICE O/P EST LOW 20 MIN: CPT | Performed by: FAMILY MEDICINE

## 2019-03-06 RX ORDER — BENAZEPRIL HYDROCHLORIDE 10 MG/1
10 TABLET ORAL DAILY
Qty: 90 TABLET | Refills: 1 | Status: SHIPPED | OUTPATIENT
Start: 2019-03-06 | End: 2019-10-11 | Stop reason: SDUPTHER

## 2019-03-06 NOTE — PROGRESS NOTES
Assessment/Plan: pt here today for follow up to review BW     No problem-specific Assessment & Plan notes found for this encounter  Diagnoses and all orders for this visit:    Essential hypertension  -     benazepril (LOTENSIN) 10 mg tablet; Take 1 tablet (10 mg total) by mouth daily for 180 days    Current mild episode of major depressive disorder without prior episode (HCC)          Subjective:      Patient ID: Judson Raygoza is a 68 y o  female  Follow up, feeling much better        The following portions of the patient's history were reviewed and updated as appropriate: allergies, current medications, past family history, past medical history, past social history, past surgical history and problem list     Current Outpatient Medications:     amLODIPine (NORVASC) 10 mg tablet, Take 1 tablet (10 mg total) by mouth daily for 180 days, Disp: 90 tablet, Rfl: 1    aspirin (ECOTRIN LOW STRENGTH) 81 mg EC tablet, Take 81 mg by mouth daily  , Disp: , Rfl:     benazepril (LOTENSIN) 10 mg tablet, Take 1 tablet (10 mg total) by mouth daily for 180 days, Disp: 90 tablet, Rfl: 1    CALCIUM CITRATE PO, Take by mouth, Disp: , Rfl:     cholecalciferol (VITAMIN D3) 1,000 units tablet, Take 1,000 Units by mouth daily  , Disp: , Rfl:     LORazepam (ATIVAN) 0 5 mg tablet, Take 1 tablet (0 5 mg total) by mouth 2 (two) times a day as needed for anxiety for up to 30 days, Disp: 30 tablet, Rfl: 3    NON FORMULARY, , Disp: , Rfl:     simvastatin (ZOCOR) 20 mg tablet, Take 1 tablet (20 mg total) by mouth daily at bedtime for 180 days, Disp: 90 tablet, Rfl: 1    tamoxifen (NOLVADEX) 20 mg tablet, Take 20 mg by mouth daily  , Disp: , Rfl:     HYDROcodone-acetaminophen (NORCO) 5-325 mg per tablet, Take 1 tablet by mouth every 6 (six) hours as needed for pain Max Daily Amount: 4 tablets (Patient not taking: Reported on 3/6/2019), Disp: 12 tablet, Rfl: 0     Allergies   Allergen Reactions    Bactrim [Sulfamethoxazole-Trimethoprim]     Lyrica [Pregabalin]     Pneumovax 23 [Pneumococcal Vac Polyvalent]     Pollen Extract     Zithromax [Azithromycin]        Review of Systems   Constitutional: Negative  HENT: Negative  Eyes: Negative  Respiratory: Negative  Cardiovascular: Negative  Gastrointestinal: Negative  Genitourinary: Negative  Musculoskeletal: Negative  Skin: Negative  Neurological: Negative  Psychiatric/Behavioral: The patient is nervous/anxious  Objective:      /72 (BP Location: Left arm, Patient Position: Sitting, Cuff Size: Standard)   Pulse 79   Temp 98 1 °F (36 7 °C) (Oral)   Ht 5' 4" (1 626 m)   Wt 64 kg (141 lb 3 2 oz)   SpO2 97%   BMI 24 24 kg/m²          Physical Exam   Constitutional: She is oriented to person, place, and time  She appears well-developed and well-nourished  HENT:   Head: Normocephalic and atraumatic  Right Ear: External ear normal    Left Ear: External ear normal    Nose: Nose normal    Mouth/Throat: Oropharynx is clear and moist    Eyes: Pupils are equal, round, and reactive to light  Conjunctivae and EOM are normal    Neck: Normal range of motion  Neck supple  Cardiovascular: Normal rate, regular rhythm, normal heart sounds and intact distal pulses  Pulmonary/Chest: Effort normal and breath sounds normal    Abdominal: Soft  Bowel sounds are normal    Neurological: She is alert and oriented to person, place, and time  She has normal reflexes  Skin: Skin is warm and dry  Psychiatric: She has a normal mood and affect   Her behavior is normal  Judgment and thought content normal

## 2019-04-25 ENCOUNTER — OFFICE VISIT (OUTPATIENT)
Dept: FAMILY MEDICINE CLINIC | Facility: CLINIC | Age: 74
End: 2019-04-25
Payer: MEDICARE

## 2019-04-25 VITALS
WEIGHT: 139.6 LBS | SYSTOLIC BLOOD PRESSURE: 124 MMHG | DIASTOLIC BLOOD PRESSURE: 76 MMHG | HEIGHT: 64 IN | BODY MASS INDEX: 23.83 KG/M2 | HEART RATE: 69 BPM | OXYGEN SATURATION: 98 % | TEMPERATURE: 98.3 F

## 2019-04-25 DIAGNOSIS — F41.9 ANXIETY: Primary | ICD-10-CM

## 2019-04-25 DIAGNOSIS — F43.9 STRESS AT HOME: ICD-10-CM

## 2019-04-25 PROCEDURE — 99213 OFFICE O/P EST LOW 20 MIN: CPT | Performed by: FAMILY MEDICINE

## 2019-04-25 RX ORDER — ESCITALOPRAM OXALATE 10 MG/1
10 TABLET ORAL DAILY
Qty: 30 TABLET | Refills: 5 | Status: SHIPPED | OUTPATIENT
Start: 2019-04-25 | End: 2019-07-31

## 2019-04-25 RX ORDER — LORAZEPAM 0.5 MG/1
0.5 TABLET ORAL 2 TIMES DAILY PRN
Qty: 60 TABLET | Refills: 3 | Status: SHIPPED | OUTPATIENT
Start: 2019-04-25 | End: 2019-07-31 | Stop reason: SDUPTHER

## 2019-05-29 ENCOUNTER — OFFICE VISIT (OUTPATIENT)
Dept: FAMILY MEDICINE CLINIC | Facility: CLINIC | Age: 74
End: 2019-05-29
Payer: MEDICARE

## 2019-05-29 VITALS
HEART RATE: 74 BPM | OXYGEN SATURATION: 96 % | SYSTOLIC BLOOD PRESSURE: 116 MMHG | DIASTOLIC BLOOD PRESSURE: 74 MMHG | TEMPERATURE: 98.3 F | WEIGHT: 138.4 LBS | BODY MASS INDEX: 23.63 KG/M2 | HEIGHT: 64 IN

## 2019-05-29 DIAGNOSIS — G47.00 INSOMNIA, UNSPECIFIED TYPE: ICD-10-CM

## 2019-05-29 DIAGNOSIS — F43.9 STRESS AT HOME: Primary | ICD-10-CM

## 2019-05-29 PROCEDURE — 99213 OFFICE O/P EST LOW 20 MIN: CPT | Performed by: FAMILY MEDICINE

## 2019-05-29 RX ORDER — TRAZODONE HYDROCHLORIDE 50 MG/1
50 TABLET ORAL
Qty: 30 TABLET | Refills: 5 | Status: SHIPPED | OUTPATIENT
Start: 2019-05-29 | End: 2019-07-31

## 2019-06-21 DIAGNOSIS — I10 ESSENTIAL HYPERTENSION: ICD-10-CM

## 2019-06-21 RX ORDER — AMLODIPINE BESYLATE 10 MG/1
10 TABLET ORAL DAILY
Qty: 90 TABLET | Refills: 1 | Status: SHIPPED | OUTPATIENT
Start: 2019-06-21 | End: 2019-12-24 | Stop reason: SDUPTHER

## 2019-06-29 DIAGNOSIS — E78.00 HYPERCHOLESTEREMIA: ICD-10-CM

## 2019-07-01 DIAGNOSIS — E78.00 HYPERCHOLESTEREMIA: ICD-10-CM

## 2019-07-01 RX ORDER — SIMVASTATIN 20 MG
TABLET ORAL
Qty: 90 TABLET | Refills: 0 | OUTPATIENT
Start: 2019-07-01

## 2019-07-02 RX ORDER — SIMVASTATIN 20 MG
20 TABLET ORAL
Qty: 90 TABLET | Refills: 1 | Status: SHIPPED | OUTPATIENT
Start: 2019-07-02 | End: 2020-02-11 | Stop reason: SDUPTHER

## 2019-07-17 ENCOUNTER — TELEPHONE (OUTPATIENT)
Dept: FAMILY MEDICINE CLINIC | Facility: CLINIC | Age: 74
End: 2019-07-17

## 2019-07-17 DIAGNOSIS — A69.20 LYME DISEASE: Primary | ICD-10-CM

## 2019-07-17 RX ORDER — DOXYCYCLINE HYCLATE 100 MG
100 TABLET ORAL 2 TIMES DAILY
Qty: 42 TABLET | Refills: 0 | Status: SHIPPED | OUTPATIENT
Start: 2019-07-17 | End: 2019-08-07

## 2019-07-17 NOTE — TELEPHONE ENCOUNTER
Patient c/o Joint pain, chills and a lot fatigue, hardly can get out of bed because of pain and tiredness  Had Lyme's before and now has similar symptoms  She removed a tick last month, she states it couldn't have been on her body for more than a day  Wants to know if she should have blood work or if antibiotic can be sent to pharmacy

## 2019-07-17 NOTE — TELEPHONE ENCOUNTER
Per Dr Abdullahi Quiles, patient can take Doxycycline 100 mg bid for 3 weeks  Patient should notify how long after taking medication do symptoms go away  Patient was notified of instructions, she verbalized understanding

## 2019-07-31 ENCOUNTER — OFFICE VISIT (OUTPATIENT)
Dept: FAMILY MEDICINE CLINIC | Facility: CLINIC | Age: 74
End: 2019-07-31
Payer: MEDICARE

## 2019-07-31 VITALS
TEMPERATURE: 98.8 F | OXYGEN SATURATION: 97 % | WEIGHT: 138.2 LBS | DIASTOLIC BLOOD PRESSURE: 68 MMHG | HEIGHT: 64 IN | SYSTOLIC BLOOD PRESSURE: 122 MMHG | HEART RATE: 80 BPM | BODY MASS INDEX: 23.6 KG/M2

## 2019-07-31 DIAGNOSIS — M25.50 PAIN IN JOINT, MULTIPLE SITES: Primary | ICD-10-CM

## 2019-07-31 DIAGNOSIS — I10 ESSENTIAL HYPERTENSION: ICD-10-CM

## 2019-07-31 DIAGNOSIS — F41.9 ANXIETY: ICD-10-CM

## 2019-07-31 DIAGNOSIS — F43.9 STRESS AT HOME: ICD-10-CM

## 2019-07-31 DIAGNOSIS — F33.0 MILD EPISODE OF RECURRENT MAJOR DEPRESSIVE DISORDER (HCC): ICD-10-CM

## 2019-07-31 PROCEDURE — 99214 OFFICE O/P EST MOD 30 MIN: CPT | Performed by: FAMILY MEDICINE

## 2019-07-31 RX ORDER — LORAZEPAM 0.5 MG/1
0.5 TABLET ORAL 2 TIMES DAILY PRN
Qty: 60 TABLET | Refills: 0 | Status: SHIPPED | OUTPATIENT
Start: 2019-07-31 | End: 2019-10-11 | Stop reason: SDUPTHER

## 2019-07-31 NOTE — PROGRESS NOTES
Chief Complaint   Patient presents with    Lyme Disease     pt got bit about 2 months ago      Assessment/Plan:          Diagnoses and all orders for this visit:    Pain in joint, multiple sites  -     Lyme Antibody Profile with reflex to WB; Future  -     MELANIA Screen w/ Reflex to Titer/Pattern; Future  -     RPR; Future  -     Sedimentation rate, automated; Future  -     CBC and Platelet; Future    Anxiety  -     LORazepam (ATIVAN) 0 5 mg tablet; Take 1 tablet (0 5 mg total) by mouth 2 (two) times a day as needed for anxiety for up to 97 days    Essential hypertension    Mild episode of recurrent major depressive disorder (HCC)    Stress at home          Subjective:      Patient ID: Yen Whitaker is a 68 y o  female  Follow up joint pains, no changes  Mainly discomfort lower 1/2 ob body - hips, thighs  Depression/anxiety much improved  Will be going back to work 3 days a week  Seen by oncologist and every thing fine  Uses occasional ativan when gets anxious  Continues with same BP med  SH: negative tob or [place]  PMH: no changes  Stress at home is less, talking with   The following portions of the patient's history were reviewed and updated as appropriate: allergies, past family history, past medical history and past surgical history  Review of Systems   Constitutional: Negative  HENT: Negative  Eyes: Negative  Respiratory: Negative  Cardiovascular: Negative  Gastrointestinal: Negative  Genitourinary: Negative  Musculoskeletal: Positive for arthralgias  Skin: Negative  Neurological: Negative  Psychiatric/Behavioral: The patient is nervous/anxious            Objective:      /68 (BP Location: Left arm, Patient Position: Sitting, Cuff Size: Standard)   Pulse 80   Temp 98 8 °F (37 1 °C) (Oral)   Ht 5' 4" (1 626 m)   Wt 62 7 kg (138 lb 3 2 oz)   SpO2 97%   BMI 23 72 kg/m²       Current Outpatient Medications:     amLODIPine (NORVASC) 10 mg tablet, Take 1 tablet (10 mg total) by mouth daily for 180 days, Disp: 90 tablet, Rfl: 1    aspirin (ECOTRIN LOW STRENGTH) 81 mg EC tablet, Take 81 mg by mouth daily  , Disp: , Rfl:     benazepril (LOTENSIN) 10 mg tablet, Take 1 tablet (10 mg total) by mouth daily for 180 days, Disp: 90 tablet, Rfl: 1    CALCIUM CITRATE PO, Take by mouth, Disp: , Rfl:     cholecalciferol (VITAMIN D3) 1,000 units tablet, Take 1,000 Units by mouth daily  , Disp: , Rfl:     doxycycline hyclate (VIBRA-TABS) 100 mg tablet, Take 1 tablet (100 mg total) by mouth 2 (two) times a day for 21 days, Disp: 42 tablet, Rfl: 0    LORazepam (ATIVAN) 0 5 mg tablet, Take 1 tablet (0 5 mg total) by mouth 2 (two) times a day as needed for anxiety for up to 97 days, Disp: 60 tablet, Rfl: 0    simvastatin (ZOCOR) 20 mg tablet, Take 1 tablet (20 mg total) by mouth daily at bedtime for 180 days, Disp: 90 tablet, Rfl: 1    tamoxifen (NOLVADEX) 20 mg tablet, Take 20 mg by mouth daily  , Disp: , Rfl:      Allergies   Allergen Reactions    Bactrim [Sulfamethoxazole-Trimethoprim]     Lyrica [Pregabalin]     Pneumovax 23 [Pneumococcal Vac Polyvalent]     Pollen Extract     Zithromax [Azithromycin]         Physical Exam   Constitutional: She is oriented to person, place, and time  She appears well-developed and well-nourished  HENT:   Head: Normocephalic and atraumatic  Right Ear: External ear normal    Left Ear: External ear normal    Nose: Nose normal    Mouth/Throat: Oropharynx is clear and moist    Eyes: Pupils are equal, round, and reactive to light  Conjunctivae and EOM are normal    Neck: Normal range of motion  Neck supple  Cardiovascular: Normal rate, regular rhythm and normal heart sounds  Pulmonary/Chest: Effort normal and breath sounds normal    Neurological: She is alert and oriented to person, place, and time  She has normal reflexes  Skin: Skin is warm and dry  Psychiatric: She has a normal mood and affect   Her behavior is normal  Judgment and thought content normal

## 2019-08-01 ENCOUNTER — APPOINTMENT (OUTPATIENT)
Dept: LAB | Age: 74
End: 2019-08-01
Payer: MEDICARE

## 2019-08-01 DIAGNOSIS — M25.50 PAIN IN JOINT, MULTIPLE SITES: ICD-10-CM

## 2019-08-01 LAB
ERYTHROCYTE [DISTWIDTH] IN BLOOD BY AUTOMATED COUNT: 12.4 % (ref 11.6–15.1)
ERYTHROCYTE [SEDIMENTATION RATE] IN BLOOD: 2 MM/HOUR (ref 0–20)
HCT VFR BLD AUTO: 43.4 % (ref 34.8–46.1)
HGB BLD-MCNC: 13.8 G/DL (ref 11.5–15.4)
MCH RBC QN AUTO: 29.6 PG (ref 26.8–34.3)
MCHC RBC AUTO-ENTMCNC: 31.8 G/DL (ref 31.4–37.4)
MCV RBC AUTO: 93 FL (ref 82–98)
PLATELET # BLD AUTO: 210 THOUSANDS/UL (ref 149–390)
PMV BLD AUTO: 12.9 FL (ref 8.9–12.7)
RBC # BLD AUTO: 4.67 MILLION/UL (ref 3.81–5.12)
RPR SER QL: NORMAL
WBC # BLD AUTO: 9.26 THOUSAND/UL (ref 4.31–10.16)

## 2019-08-01 PROCEDURE — 86618 LYME DISEASE ANTIBODY: CPT

## 2019-08-01 PROCEDURE — 86038 ANTINUCLEAR ANTIBODIES: CPT

## 2019-08-01 PROCEDURE — 85652 RBC SED RATE AUTOMATED: CPT

## 2019-08-01 PROCEDURE — 86592 SYPHILIS TEST NON-TREP QUAL: CPT

## 2019-08-01 PROCEDURE — 36415 COLL VENOUS BLD VENIPUNCTURE: CPT

## 2019-08-01 PROCEDURE — 85027 COMPLETE CBC AUTOMATED: CPT

## 2019-08-02 LAB — RYE IGE QN: NEGATIVE

## 2019-08-03 LAB
B BURGDOR IGG SER IA-ACNC: 0.06
B BURGDOR IGM SER IA-ACNC: 0.31

## 2019-08-05 ENCOUNTER — TELEPHONE (OUTPATIENT)
Dept: FAMILY MEDICINE CLINIC | Facility: CLINIC | Age: 74
End: 2019-08-05

## 2019-08-16 ENCOUNTER — OFFICE VISIT (OUTPATIENT)
Dept: FAMILY MEDICINE CLINIC | Facility: CLINIC | Age: 74
End: 2019-08-16
Payer: MEDICARE

## 2019-08-16 VITALS
OXYGEN SATURATION: 96 % | BODY MASS INDEX: 23.42 KG/M2 | WEIGHT: 137.2 LBS | HEART RATE: 74 BPM | TEMPERATURE: 98.9 F | HEIGHT: 64 IN | SYSTOLIC BLOOD PRESSURE: 132 MMHG | DIASTOLIC BLOOD PRESSURE: 78 MMHG

## 2019-08-16 DIAGNOSIS — F41.9 ANXIETY: ICD-10-CM

## 2019-08-16 DIAGNOSIS — R07.81 RIB PAIN ON RIGHT SIDE: Primary | ICD-10-CM

## 2019-08-16 DIAGNOSIS — F33.0 MILD EPISODE OF RECURRENT MAJOR DEPRESSIVE DISORDER (HCC): ICD-10-CM

## 2019-08-16 DIAGNOSIS — I10 ESSENTIAL HYPERTENSION: ICD-10-CM

## 2019-08-16 DIAGNOSIS — E78.00 HYPERCHOLESTEREMIA: ICD-10-CM

## 2019-08-16 PROCEDURE — 99214 OFFICE O/P EST MOD 30 MIN: CPT | Performed by: FAMILY MEDICINE

## 2019-08-16 NOTE — PROGRESS NOTES
Chief Complaint   Patient presents with    Follow-up    review BW    talk about medication     Assessment/Plan:Lets see how feel after couple months of working  Hold the statin for now  Continue other Med's  Diagnoses and all orders for this visit:    Rib pain on right side    Anxiety    Mild episode of recurrent major depressive disorder ([address] )    Essential hypertension    Hypercholesteremia          Subjective:      Patient ID: Tessie Citizen is a 69 y o  female  Follow up anxiety and mm aches )  Thigh mm ache  Also right lateral upper ribs hurt chen in am   Rib Fx past February  Starts work early September, will be working 15 - 20 hours a week  Feeling depressed, asked about Effexor for depression treatment- gets along with tamoxfin  BP controlled  Chol med may be causing mm aches  Stress at home  The following portions of the patient's history were reviewed and updated as appropriate: past family history and past surgical history  Review of Systems   Constitutional: Negative  HENT: Negative  Eyes: Negative  Respiratory: Negative  Cardiovascular: Negative  Gastrointestinal: Negative  Genitourinary: Negative  Musculoskeletal: Negative  Skin: Negative  Neurological: Negative  Psychiatric/Behavioral: The patient is nervous/anxious            Objective:      /78 (BP Location: Left arm, Patient Position: Sitting, Cuff Size: Standard)   Pulse 74   Temp 98 9 °F (37 2 °C) (Oral)   Ht 5' 4" (1 626 m)   Wt 62 2 kg (137 lb 3 2 oz)   SpO2 96%   BMI 23 55 kg/m²       Current Outpatient Medications:     amLODIPine (NORVASC) 10 mg tablet, Take 1 tablet (10 mg total) by mouth daily for 180 days, Disp: 90 tablet, Rfl: 1    aspirin (ECOTRIN LOW STRENGTH) 81 mg EC tablet, Take 81 mg by mouth daily  , Disp: , Rfl:     benazepril (LOTENSIN) 10 mg tablet, Take 1 tablet (10 mg total) by mouth daily for 180 days, Disp: 90 tablet, Rfl: 1    CALCIUM CITRATE PO, Take by mouth, Disp: , Rfl:     cholecalciferol (VITAMIN D3) 1,000 units tablet, Take 1,000 Units by mouth daily  , Disp: , Rfl:     LORazepam (ATIVAN) 0 5 mg tablet, Take 1 tablet (0 5 mg total) by mouth 2 (two) times a day as needed for anxiety for up to 97 days, Disp: 60 tablet, Rfl: 0    simvastatin (ZOCOR) 20 mg tablet, Take 1 tablet (20 mg total) by mouth daily at bedtime for 180 days, Disp: 90 tablet, Rfl: 1    tamoxifen (NOLVADEX) 20 mg tablet, Take 20 mg by mouth daily  , Disp: , Rfl:      Allergies   Allergen Reactions    Bactrim [Sulfamethoxazole-Trimethoprim]     Lyrica [Pregabalin]     Pneumovax 23 [Pneumococcal Vac Polyvalent]     Pollen Extract     Zithromax [Azithromycin]         Physical Exam   Constitutional: She is oriented to person, place, and time  She appears well-developed and well-nourished  HENT:   Head: Normocephalic and atraumatic  Right Ear: External ear normal    Left Ear: External ear normal    Nose: Nose normal    Mouth/Throat: Oropharynx is clear and moist    Eyes: Pupils are equal, round, and reactive to light  Conjunctivae and EOM are normal    Neck: Normal range of motion  Neck supple  Cardiovascular: Normal rate, regular rhythm, normal heart sounds and intact distal pulses  Pulmonary/Chest: Effort normal and breath sounds normal    Abdominal: Soft  Bowel sounds are normal    Musculoskeletal:   Tender rib IS right lateral upper ribs  Neurological: She is alert and oriented to person, place, and time  She has normal reflexes  Skin: Skin is warm and dry  Psychiatric: She has a normal mood and affect   Her behavior is normal  Judgment and thought content normal

## 2019-08-20 RX ORDER — UBIDECARENONE 75 MG
50 CAPSULE ORAL DAILY
COMMUNITY
End: 2022-04-12 | Stop reason: ALTCHOICE

## 2019-08-26 ENCOUNTER — ANESTHESIA (OUTPATIENT)
Dept: GASTROENTEROLOGY | Facility: HOSPITAL | Age: 74
End: 2019-08-26

## 2019-08-26 ENCOUNTER — HOSPITAL ENCOUNTER (OUTPATIENT)
Dept: GASTROENTEROLOGY | Facility: HOSPITAL | Age: 74
Setting detail: OUTPATIENT SURGERY
Discharge: HOME | End: 2019-08-26
Attending: COLON & RECTAL SURGERY | Admitting: COLON & RECTAL SURGERY
Payer: MEDICARE

## 2019-08-26 ENCOUNTER — ANESTHESIA EVENT (OUTPATIENT)
Dept: GASTROENTEROLOGY | Facility: HOSPITAL | Age: 74
End: 2019-08-26

## 2019-08-26 VITALS
HEIGHT: 64 IN | RESPIRATION RATE: 20 BRPM | WEIGHT: 135 LBS | HEART RATE: 72 BPM | BODY MASS INDEX: 23.05 KG/M2 | SYSTOLIC BLOOD PRESSURE: 106 MMHG | DIASTOLIC BLOOD PRESSURE: 58 MMHG | TEMPERATURE: 98.9 F | OXYGEN SATURATION: 95 %

## 2019-08-26 DIAGNOSIS — Z12.11 ENCOUNTER FOR SCREENING FOR MALIGNANT NEOPLASM OF COLON: ICD-10-CM

## 2019-08-26 DIAGNOSIS — K62.89 PROCTITIS: Primary | ICD-10-CM

## 2019-08-26 DIAGNOSIS — Z86.010 HISTORY OF COLONIC POLYPS: ICD-10-CM

## 2019-08-26 PROCEDURE — 88305 TISSUE EXAM BY PATHOLOGIST: CPT | Performed by: PATHOLOGY

## 2019-08-26 RX ORDER — MESALAMINE 1000 MG/1
1000 SUPPOSITORY RECTAL
Qty: 30 SUPPOSITORY | Refills: 5 | Status: SHIPPED | OUTPATIENT
Start: 2019-08-26 | End: 2022-08-08

## 2019-08-26 RX ORDER — LIDOCAINE HYDROCHLORIDE 20 MG/ML
INJECTION, SOLUTION EPIDURAL; INFILTRATION; INTRACAUDAL; PERINEURAL AS NEEDED
Status: DISCONTINUED | OUTPATIENT
Start: 2019-08-26 | End: 2019-08-26 | Stop reason: SURG

## 2019-08-26 RX ORDER — PROPOFOL 10 MG/ML
INJECTION, EMULSION INTRAVENOUS AS NEEDED
Status: DISCONTINUED | OUTPATIENT
Start: 2019-08-26 | End: 2019-08-26 | Stop reason: SURG

## 2019-08-26 RX ORDER — SODIUM CHLORIDE 9 MG/ML
125 INJECTION, SOLUTION INTRAVENOUS CONTINUOUS
Status: DISCONTINUED | OUTPATIENT
Start: 2019-08-26 | End: 2019-08-26

## 2019-08-26 RX ADMIN — PROPOFOL 50 MG: 10 INJECTION, EMULSION INTRAVENOUS at 09:30

## 2019-08-26 RX ADMIN — PROPOFOL 50 MG: 10 INJECTION, EMULSION INTRAVENOUS at 09:20

## 2019-08-26 RX ADMIN — PROPOFOL 50 MG: 10 INJECTION, EMULSION INTRAVENOUS at 09:26

## 2019-08-26 RX ADMIN — PROPOFOL 100 MG: 10 INJECTION, EMULSION INTRAVENOUS at 09:16

## 2019-08-26 RX ADMIN — LIDOCAINE HYDROCHLORIDE 20 MG: 20 INJECTION, SOLUTION EPIDURAL; INFILTRATION; INTRACAUDAL; PERINEURAL at 09:16

## 2019-08-26 RX ADMIN — SODIUM CHLORIDE 125 ML/HR: 0.9 INJECTION, SOLUTION INTRAVENOUS at 08:22

## 2019-08-26 NOTE — ANESTHESIA PREPROCEDURE EVALUATION
Review of Systems/Medical History  Patient summary reviewed  Chart reviewed      Cardiovascular  Hyperlipidemia, Hypertension ,    Pulmonary  Negative pulmonary ROS        GI/Hepatic    GERD well controlled,        Negative  ROS        Endo/Other  Negative endo/other ROS      GYN  Negative gynecology ROS          Hematology   Musculoskeletal    Comment: Fibromyalgia Arthritis     Neurology    Headaches,    Psychology   Anxiety, Depression ,              Physical Exam    Airway    Mallampati score: II  TM Distance: >3 FB  Neck ROM: full     Dental   No notable dental hx lower dentures and upper dentures,     Cardiovascular  Rhythm: regular, Rate: normal, Cardiovascular exam normal    Pulmonary  Pulmonary exam normal Breath sounds clear to auscultation,     Other Findings        Anesthesia Plan  ASA Score- 2     Anesthesia Type- IV sedation with anesthesia with ASA Monitors  Additional Monitors:   Airway Plan:         Plan Factors-    Induction-     Postoperative Plan-     Informed Consent- Anesthetic plan and risks discussed with patient

## 2019-08-26 NOTE — INTERVAL H&P NOTE
H&P reviewed  After examining the patient I find no changes in the patients condition since the H&P had been written      Vitals:    08/26/19 0805   BP: 136/82   Pulse: 80   Resp: 18   Temp: 98 9 °F (37 2 °C)   SpO2: 96%

## 2019-08-26 NOTE — DISCHARGE INSTRUCTIONS
[facility]  [facility] [address] [facility], [address] [place] [address] [place]  Phone: [phone]    DISCHARGE INSTRUCTIONS:    1   ___ Complete Exam - Normal    2   ___ Exam normal, but entire colon not seen  We will discuss this with you  3   ___ Polyp(s) removed by "burning" - NO pathology report will follow    4   ___ Polyp(s) removed by excision  Pathology report will be available in 4-5 days   Someone from our office will call you with results  5   __x_ Exam prompted biopsies  Pathology report will be available in 4-5 days   Someone from our office will call you with results  6   ___ Exam demonstrated findings that need treatment  Prescriptions will be   Given to you  Return to our office in ____ weeks  Please call for appt  7   ___ Original office visit or colonoscopy findings necessitate an office visit  Please call to set up a new appointment    8   ___ Medication  __________________________________________        [address]:    - Go straight home and rest today    - No driving or drinking alcohol for 24 hours    - Resume regular diet and medications unless otherwise instructed  Coumadin and Plavix are blood thinners  You can resume these medications on __________  IF YOU ARE HAVING ANY FEVER, BLEEDING OR PERSISTENT PAIN IN THE ABDOMEN, PLEASE CALL OUR OFFICE ANY DAY OR TIME  [phone]        Proctitis   WHAT YOU NEED TO KNOW:   Proctitis is a condition where you have inflammation of the lining of your rectum  The rectum is the last part of your large intestine that ends at your anus  If the inflammation continues into your colon, it is called proctolitis  Proctitis may be a short-term or long-term condition  DISCHARGE INSTRUCTIONS:   Medicines:   · Antibiotics: This medicine is given if a bacterial infection is causing your proctitis  Take them as directed  · Antiviral medicine:   This medicine is given if a viral infection is causing your proctitis  · Antiinflammatory medicine: This medicine helps prevent swelling  · Antiulcer medicine: This is given as a pill, suppository, or enema to coat the bowel and help prevent further damage to the tissues  It may also help with tissue healing  · Steroids:  Steroid medicine helps decrease swelling  · Take your medicine as directed  Contact your healthcare provider if you think your medicine is not helping or if you have side effects  Tell him or her if you are allergic to any medicine  Keep a list of the medicines, vitamins, and herbs you take  Include the amounts, and when and why you take them  Bring the list or the pill bottles to follow-up visits  Carry your medicine list with you in case of an emergency  Follow up with your healthcare provider as directed: You may need to return for other tests  Write down your questions so you remember to ask them during your visits  Prevent proctitis:   · Ask about medicines to help ease your symptoms:  If you have constipation, ask about fiber supplements  If you have trouble controlling your bowel, ask about stool-forming medicines  Ask about a good skin care product to use if the skin around your anus is irritated  · Ask about medicines to help prevent proctitis:  If you are having radiation, these medicines may help prevent you from having proctitis after your therapy  · Practice safe sex:  Do not have sex with someone who has an STI  This includes oral or anal sex  Do not have sex while you or your partner is being treated for a STI  Use new a latex condom or proper barrier each time you have sex  · Get regular check-ups:  Have a regular sexual health check if you often change sexual partners  · Wash your hands often:  Use soap and water  Use gel hand cleanser when there is no soap and water available  This will prevent the spread of germs   Always wash your hands after you use the toilet, when you work with food, and before and after you have sex  Clean your toilet seats, water taps, and door handles often  Contact your healthcare provider if:   · You have bleeding or pain during or after sex  · You have signs and symptoms that are new, do not improve, or get worse  · You have questions or concerns about your condition or care  Seek care immediately or call 911 if:   · You have severe abdominal or rectal pain that does not go away  · You have blood, pus, or a foul-smelling discharge coming from your anus or rectum  · You have joint pain, swollen lymph nodes, or night sweats  · You have genital swelling or pain or unusual bleeding  · Your stools are black or have blood on them  © 2017 [facility] Information is for End User's use only and may not be sold, redistributed or otherwise used for commercial purposes  All illustrations and images included in CareNotes® are the copyrighted property of A D A M [employer] Inc  or Dim[employer]eman  The above information is an  only  It is not intended as medical advice for individual conditions or treatments  Talk to your doctor, nurse or pharmacist before following any medical regimen to see if it is safe and effective for you  Colonoscopy   WHAT YOU NEED TO KNOW:   A colonoscopy is a procedure to examine the inside of your colon (intestine) with a scope  Polyps or tissue growths may have been removed during your colonoscopy  It is normal to feel bloated and to have some abdominal discomfort  You should be passing gas  If you have hemorrhoids or you had polyps removed, you may have a small amount of bleeding  DISCHARGE INSTRUCTIONS:   Seek care immediately if:   · You have a large amount of bright red blood in your bowel movements  · Your abdomen is hard and firm and you have severe pain  · You have sudden trouble breathing  Contact your healthcare provider if:   · You develop a rash or hives      · You have a fever within 24 hours of your procedure       · You have not had a bowel movement for 3 days after your procedure  · You have questions or concerns about your condition or care  Activity:   · Do not lift, strain, or run  for 3 days after your procedure  · Rest after your procedure  You have been given medicine to relax you  Do not  drive or make important decisions until the day after your procedure  Return to your normal activity as directed  · Relieve gas and discomfort from bloating  by lying on your right side with a heating pad on your abdomen  You may need to take short walks to help the gas move out  Eat small meals until bloating is relieved  If you had polyps removed: For 7 days after your procedure:  · Do not  take aspirin  · Do not  go on long car rides  Follow up with your healthcare provider as directed:  Write down your questions so you remember to ask them during your visits  © 2017 [address] [employer] [employer] is for End User's use only and may not be sold, redistributed or otherwise used for commercial purposes  All illustrations and images included in CareNotes® are the copyrighted property of [employer] , [employer]  or Dimas Bateman  The above information is an  only  It is not intended as medical advice for individual conditions or treatments  Talk to your doctor, nurse or pharmacist before following any medical regimen to see if it is safe and effective for you

## 2019-10-11 ENCOUNTER — OFFICE VISIT (OUTPATIENT)
Dept: FAMILY MEDICINE CLINIC | Facility: CLINIC | Age: 74
End: 2019-10-11
Payer: MEDICARE

## 2019-10-11 VITALS
WEIGHT: 135.6 LBS | HEART RATE: 82 BPM | OXYGEN SATURATION: 97 % | BODY MASS INDEX: 23.15 KG/M2 | SYSTOLIC BLOOD PRESSURE: 112 MMHG | DIASTOLIC BLOOD PRESSURE: 66 MMHG | TEMPERATURE: 98.7 F | HEIGHT: 64 IN

## 2019-10-11 DIAGNOSIS — S23.41XA RIB SPRAIN, INITIAL ENCOUNTER: ICD-10-CM

## 2019-10-11 DIAGNOSIS — M54.6 CHRONIC RIGHT-SIDED THORACIC BACK PAIN: ICD-10-CM

## 2019-10-11 DIAGNOSIS — I10 ESSENTIAL HYPERTENSION: ICD-10-CM

## 2019-10-11 DIAGNOSIS — Z23 ENCOUNTER FOR IMMUNIZATION: ICD-10-CM

## 2019-10-11 DIAGNOSIS — G89.29 CHRONIC RIGHT-SIDED THORACIC BACK PAIN: ICD-10-CM

## 2019-10-11 DIAGNOSIS — M99.02 SOMATIC DYSFUNCTION OF THORACIC REGION: ICD-10-CM

## 2019-10-11 DIAGNOSIS — Z11.59 NEED FOR HEPATITIS C SCREENING TEST: Primary | ICD-10-CM

## 2019-10-11 DIAGNOSIS — S29.012A STRAIN OF MUSCLE AND TENDON OF BACK WALL OF THORAX, INITIAL ENCOUNTER: ICD-10-CM

## 2019-10-11 DIAGNOSIS — M99.08 SEGMENTAL AND SOMATIC DYSFUNCTION OF RIB CAGE: ICD-10-CM

## 2019-10-11 DIAGNOSIS — F41.9 ANXIETY: ICD-10-CM

## 2019-10-11 PROCEDURE — 98925 OSTEOPATH MANJ 1-2 REGIONS: CPT | Performed by: FAMILY MEDICINE

## 2019-10-11 PROCEDURE — G0008 ADMIN INFLUENZA VIRUS VAC: HCPCS

## 2019-10-11 PROCEDURE — 99214 OFFICE O/P EST MOD 30 MIN: CPT | Performed by: FAMILY MEDICINE

## 2019-10-11 PROCEDURE — 90662 IIV NO PRSV INCREASED AG IM: CPT

## 2019-10-11 RX ORDER — BENAZEPRIL HYDROCHLORIDE 10 MG/1
10 TABLET ORAL DAILY
Qty: 90 TABLET | Refills: 1 | Status: SHIPPED | OUTPATIENT
Start: 2019-10-11 | End: 2020-04-27 | Stop reason: SDUPTHER

## 2019-10-11 RX ORDER — LORAZEPAM 0.5 MG/1
0.5 TABLET ORAL 2 TIMES DAILY PRN
Qty: 60 TABLET | Refills: 0 | Status: SHIPPED | OUTPATIENT
Start: 2019-10-11 | End: 2019-11-25

## 2019-10-11 NOTE — PROGRESS NOTES
Chief Complaint   Patient presents with    Follow-up    Anxiety     Assessment/Plan:  Continue working  Diagnoses and all orders for this visit:    Need for hepatitis C screening test  -     Hepatitis C antibody; Future    Essential hypertension  -     benazepril (LOTENSIN) 10 mg tablet; Take 1 tablet (10 mg total) by mouth daily    Anxiety  -     LORazepam (ATIVAN) 0 5 mg tablet; Take 1 tablet (0 5 mg total) by mouth 2 (two) times a day as needed for anxiety    Encounter for immunization  -     influenza vaccine, high-dose, PF 0 5 mL    Chronic right-sided thoracic back pain    Rib sprain, initial encounter    Segmental and somatic dysfunction of rib cage    Somatic dysfunction of thoracic region    Strain of muscle and tendon of back wall of thorax, initial encounter          Subjective:      Patient ID: Nicole Tello is a 69 y o  female  Follow up anxiety and depression  No more depression  Gets anxious or anxiety attacks intermittently  Working 10 - 15 hours per week and loves it  Also tender ribs under right breast   Discomfort travels all thej aroud to corresponding Thorax vertebrae  Patient noted thorax pain posterior  The following portions of the patient's history were reviewed and updated as appropriate: allergies, current medications, past medical history, past social history and problem list     Review of Systems   Constitutional: Negative  HENT: Negative  Eyes: Negative  Respiratory: Negative  Cardiovascular: Negative  Gastrointestinal: Negative  Genitourinary: Negative  Musculoskeletal: Positive for back pain  Skin: Negative  Neurological: Negative  Psychiatric/Behavioral: Negative            Objective:      /66 (BP Location: Left arm, Patient Position: Sitting, Cuff Size: Standard)   Pulse 82   Temp 98 7 °F (37 1 °C) (Oral)   Ht 5' 4" (1 626 m)   Wt 61 5 kg (135 lb 9 6 oz)   LMP  (LMP Unknown)   SpO2 97%   BMI 23 28 kg/m²     Current Outpatient Medications:     amLODIPine (NORVASC) 10 mg tablet, Take 1 tablet (10 mg total) by mouth daily for 180 days, Disp: 90 tablet, Rfl: 1    aspirin (ECOTRIN LOW STRENGTH) 81 mg EC tablet, Take 81 mg by mouth daily  , Disp: , Rfl:     benazepril (LOTENSIN) 10 mg tablet, Take 1 tablet (10 mg total) by mouth daily for 180 days, Disp: 90 tablet, Rfl: 1    CALCIUM CITRATE PO, Take by mouth, Disp: , Rfl:     Calcium Glycerophosphate (PRELIEF PO), Take 2 tablets by mouth as needed, Disp: , Rfl:     cholecalciferol (VITAMIN D3) 1,000 units tablet, Take 1,000 Units by mouth daily  , Disp: , Rfl:     cyanocobalamin (VITAMIN B-12) 100 mcg tablet, Take 50 mcg by mouth daily, Disp: , Rfl:     LORazepam (ATIVAN) 0 5 mg tablet, Take 1 tablet (0 5 mg total) by mouth 2 (two) times a day as needed for anxiety for up to 97 days, Disp: 60 tablet, Rfl: 0    mesalamine (CANASA) 1,000 mg suppository, Insert 1 suppository (1,000 mg total) into the rectum daily at bedtime for 180 days, Disp: 30 suppository, Rfl: 5    simvastatin (ZOCOR) 20 mg tablet, Take 1 tablet (20 mg total) by mouth daily at bedtime for 180 days (Patient taking differently: Take 20 mg by mouth every other day ), Disp: 90 tablet, Rfl: 1    tamoxifen (NOLVADEX) 20 mg tablet, Take 20 mg by mouth daily  , Disp: , Rfl:      Allergies   Allergen Reactions    Bactrim [Sulfamethoxazole-Trimethoprim]     Lyrica [Pregabalin]     Pneumovax 23 [Pneumococcal Vac Polyvalent]     Pollen Extract     Zithromax [Azithromycin]           Physical Exam   Constitutional: She is oriented to person, place, and time  She appears well-developed and well-nourished  HENT:   Head: Normocephalic and atraumatic  Right Ear: External ear normal    Left Ear: External ear normal    Nose: Nose normal    Mouth/Throat: Oropharynx is clear and moist    Eyes: Pupils are equal, round, and reactive to light  Conjunctivae and EOM are normal    Neck: Normal range of motion  Neck supple  Cardiovascular: Normal rate, regular rhythm, normal heart sounds and intact distal pulses  Pulmonary/Chest: Effort normal and breath sounds normal    Abdominal: Soft  Bowel sounds are normal    Musculoskeletal:   Tender mid TV on right and ribs under right breast    Neurological: She is alert and oriented to person, place, and time  She has normal reflexes  Skin: Skin is warm and dry  Psychiatric: She has a normal mood and affect   Her behavior is normal  Judgment and thought content normal

## 2019-10-11 NOTE — PROGRESS NOTES
Chart updated per office request  Discrete reportable data documented      *Updated mammograms, DOS:  7-26-19  7-20-18  7-14-17  9-4-15  9-3-14  8-20-12

## 2019-10-14 NOTE — PROGRESS NOTES
OMT  Performed by: Belle Christine DO  Authorized by: Belle Christine DO     Verbal consent obtained?: Yes    Risks and benefits: Risks, benefits and alternatives were discussed    Consent given by:  Patient  Procedure Details:     Region evaluated and treated:  Thoracic T5 - T9 and Ribs    Thoracic T5 - T9 details:     Examination Method:  Asymmetry, Misalignment, Crepitation, Defects, Masses and Tenderness, Pain    Severity:  Mild    Treatment Method:  High Velocity, Low Amplitude Treatment and Soft Tissue Treatment    Response:  Improved - The somatic dysfunction is improved but not completely resolved  Ribs details:     Examination Method:  Asymmetry, Misalignment, Crepitation, Defects, Masses and Tenderness, Pain    Treatment Method:  Muscle Energy Treatment and Myofascial Release Treatment    Response:  Improved - The somatic dysfunction is improved but not completely resolved      Total Regions Treated:  2

## 2019-11-25 ENCOUNTER — OFFICE VISIT (OUTPATIENT)
Dept: UROLOGY | Facility: CLINIC | Age: 74
End: 2019-11-25
Payer: MEDICARE

## 2019-11-25 VITALS — HEIGHT: 64 IN | HEART RATE: 70 BPM | BODY MASS INDEX: 22.88 KG/M2 | WEIGHT: 134 LBS

## 2019-11-25 DIAGNOSIS — N31.9 NEUROMUSCULAR DYSFUNCTION OF BLADDER: Primary | ICD-10-CM

## 2019-11-25 PROCEDURE — 99213 OFFICE O/P EST LOW 20 MIN: CPT | Performed by: PHYSICIAN ASSISTANT

## 2019-11-25 RX ORDER — LORAZEPAM 0.5 MG/1
0.5 TABLET ORAL
Qty: 30 TABLET | Refills: 5 | Status: SHIPPED | OUTPATIENT
Start: 2019-11-25 | End: 2020-05-26 | Stop reason: SDUPTHER

## 2019-12-24 DIAGNOSIS — I10 ESSENTIAL HYPERTENSION: ICD-10-CM

## 2019-12-24 RX ORDER — AMLODIPINE BESYLATE 10 MG/1
TABLET ORAL
Qty: 90 TABLET | Refills: 0 | OUTPATIENT
Start: 2019-12-24

## 2019-12-24 RX ORDER — AMLODIPINE BESYLATE 10 MG/1
10 TABLET ORAL DAILY
Qty: 90 TABLET | Refills: 1 | Status: SHIPPED | OUTPATIENT
Start: 2019-12-24 | End: 2020-06-15 | Stop reason: SDUPTHER

## 2020-01-24 ENCOUNTER — APPOINTMENT (OUTPATIENT)
Dept: LAB | Age: 75
End: 2020-01-24
Payer: MEDICARE

## 2020-01-24 DIAGNOSIS — Z11.59 NEED FOR HEPATITIS C SCREENING TEST: ICD-10-CM

## 2020-01-24 LAB — HCV AB SER QL: NORMAL

## 2020-01-24 PROCEDURE — 86803 HEPATITIS C AB TEST: CPT

## 2020-01-24 PROCEDURE — 36415 COLL VENOUS BLD VENIPUNCTURE: CPT

## 2020-02-11 ENCOUNTER — OFFICE VISIT (OUTPATIENT)
Dept: FAMILY MEDICINE CLINIC | Facility: CLINIC | Age: 75
End: 2020-02-11
Payer: MEDICARE

## 2020-02-11 ENCOUNTER — TELEPHONE (OUTPATIENT)
Dept: ADMINISTRATIVE | Facility: OTHER | Age: 75
End: 2020-02-11

## 2020-02-11 ENCOUNTER — TELEPHONE (OUTPATIENT)
Dept: NEUROLOGY | Facility: CLINIC | Age: 75
End: 2020-02-11

## 2020-02-11 VITALS
HEIGHT: 64 IN | OXYGEN SATURATION: 97 % | HEART RATE: 73 BPM | BODY MASS INDEX: 22.91 KG/M2 | TEMPERATURE: 98.1 F | WEIGHT: 134.2 LBS | RESPIRATION RATE: 15 BRPM | DIASTOLIC BLOOD PRESSURE: 76 MMHG | SYSTOLIC BLOOD PRESSURE: 130 MMHG

## 2020-02-11 DIAGNOSIS — H04.209 SNEEZING WITH WATERY EYES: ICD-10-CM

## 2020-02-11 DIAGNOSIS — F33.0 MILD EPISODE OF RECURRENT MAJOR DEPRESSIVE DISORDER (HCC): ICD-10-CM

## 2020-02-11 DIAGNOSIS — R25.1 TREMOR: ICD-10-CM

## 2020-02-11 DIAGNOSIS — E78.00 HYPERCHOLESTEREMIA: ICD-10-CM

## 2020-02-11 DIAGNOSIS — F43.9 STRESS AT HOME: ICD-10-CM

## 2020-02-11 DIAGNOSIS — R06.7 SNEEZING WITH WATERY EYES: ICD-10-CM

## 2020-02-11 DIAGNOSIS — F41.9 ANXIETY: ICD-10-CM

## 2020-02-11 DIAGNOSIS — Z00.00 MEDICARE ANNUAL WELLNESS VISIT, SUBSEQUENT: Primary | ICD-10-CM

## 2020-02-11 PROCEDURE — 1160F RVW MEDS BY RX/DR IN RCRD: CPT | Performed by: FAMILY MEDICINE

## 2020-02-11 PROCEDURE — 1123F ACP DISCUSS/DSCN MKR DOCD: CPT | Performed by: FAMILY MEDICINE

## 2020-02-11 PROCEDURE — 3075F SYST BP GE 130 - 139MM HG: CPT | Performed by: FAMILY MEDICINE

## 2020-02-11 PROCEDURE — 3078F DIAST BP <80 MM HG: CPT | Performed by: FAMILY MEDICINE

## 2020-02-11 PROCEDURE — G0439 PPPS, SUBSEQ VISIT: HCPCS | Performed by: FAMILY MEDICINE

## 2020-02-11 PROCEDURE — 99215 OFFICE O/P EST HI 40 MIN: CPT | Performed by: FAMILY MEDICINE

## 2020-02-11 PROCEDURE — 3008F BODY MASS INDEX DOCD: CPT | Performed by: FAMILY MEDICINE

## 2020-02-11 PROCEDURE — 1170F FXNL STATUS ASSESSED: CPT | Performed by: FAMILY MEDICINE

## 2020-02-11 PROCEDURE — 1125F AMNT PAIN NOTED PAIN PRSNT: CPT | Performed by: FAMILY MEDICINE

## 2020-02-11 PROCEDURE — 1036F TOBACCO NON-USER: CPT | Performed by: FAMILY MEDICINE

## 2020-02-11 RX ORDER — LEVOCETIRIZINE DIHYDROCHLORIDE 5 MG/1
5 TABLET, FILM COATED ORAL EVERY EVENING
Qty: 30 TABLET | Refills: 5 | Status: SHIPPED | OUTPATIENT
Start: 2020-02-11 | End: 2020-10-19

## 2020-02-11 RX ORDER — PROPRANOLOL/HYDROCHLOROTHIAZID 40 MG-25MG
1 TABLET ORAL DAILY
COMMUNITY
End: 2020-10-19

## 2020-02-11 RX ORDER — SIMVASTATIN 20 MG
20 TABLET ORAL
Qty: 90 TABLET | Refills: 1 | Status: SHIPPED | OUTPATIENT
Start: 2020-02-11 | End: 2020-12-21 | Stop reason: DRUGHIGH

## 2020-02-11 RX ORDER — SENNOSIDES 8.6 MG
650 CAPSULE ORAL EVERY 8 HOURS PRN
COMMUNITY

## 2020-02-11 NOTE — PROGRESS NOTES
Chief Complaint   Patient presents with    Follow-up     4 month f/u, would like referral to Neurology because of possible Parkinson's    Depression     would like to talk about medicaiton for depression       Assessment/Plan:  Discussed stress/anxiety  Free time when not working needs to be filled with positives  Try using Melatonin at HS, use the Ativan during the day if needed  Diagnoses and all orders for this visit:    Medicare annual wellness visit, subsequent    Hypercholesteremia  -     simvastatin (ZOCOR) 20 mg tablet; Take 1 tablet (20 mg total) by mouth daily at bedtime    Anxiety    Stress at home    Tremor  -     Ambulatory referral to Neurology; Future    Mild episode of recurrent major depressive disorder (HCC)    Sneezing with watery eyes  -     levocetirizine (XYZAL) 5 MG tablet; Take 1 tablet (5 mg total) by mouth every evening    Other orders  -     acetaminophen (TYLENOL) 650 mg CR tablet; Take 650 mg by mouth every 8 (eight) hours as needed  -     Turmeric 500 MG CAPS; Take 1 capsule by mouth daily          Subjective:      Patient ID: Clarissa Crespo is a 69 y o  female  11:30am  Depression  Think may have Parkinson's, mild tremor  Sneezing a lot, watery eyes  Now working in a [employer]  Anxiety when home  Feeling little depressed  Working 3 days a week, 5 - 6 hours a day  Feels good at work  Wants to be calm during day and be able to sleep at HS  Request not to take something for nerves or depression on daily basis  BP controlled, continues with statin        The following portions of the patient's history were reviewed and updated as appropriate: allergies, current medications, past medical history, past social history, past surgical history and problem list   I have spent 40 minutes with Patient  today in which greater than 50% of this time was spent in counseling/coordination of care regarding Diagnostic results, Risks and benefits of tx options, Intructions for management, Patient and family education, Risk factor reductions and Impressions  Review of Systems   Constitutional: Negative  HENT: Positive for sneezing  Eyes: Negative  Negative for redness, itching and visual disturbance  Respiratory: Negative  Cardiovascular: Negative  Gastrointestinal: Negative  Genitourinary: Negative  Musculoskeletal: Negative  Skin: Negative  Neurological: Negative  Psychiatric/Behavioral: The patient is nervous/anxious            Objective:      /76 (BP Location: Left arm, Patient Position: Sitting, Cuff Size: Standard)   Pulse 73   Temp 98 1 °F (36 7 °C) (Oral)   Resp 15   Ht 5' 4" (1 626 m)   Wt 60 9 kg (134 lb 3 2 oz)   LMP  (LMP Unknown)   SpO2 97%   BMI 23 04 kg/m²       Current Outpatient Medications:     acetaminophen (TYLENOL) 650 mg CR tablet, Take 650 mg by mouth every 8 (eight) hours as needed, Disp: , Rfl:     amLODIPine (NORVASC) 10 mg tablet, Take 1 tablet (10 mg total) by mouth daily, Disp: 90 tablet, Rfl: 1    aspirin (ECOTRIN LOW STRENGTH) 81 mg EC tablet, Take 81 mg by mouth daily  , Disp: , Rfl:     benazepril (LOTENSIN) 10 mg tablet, Take 1 tablet (10 mg total) by mouth daily, Disp: 90 tablet, Rfl: 1    CALCIUM CITRATE PO, Take by mouth, Disp: , Rfl:     Calcium Glycerophosphate (PRELIEF PO), Take 2 tablets by mouth as needed, Disp: , Rfl:     cholecalciferol (VITAMIN D3) 1,000 units tablet, Take 1,000 Units by mouth daily  , Disp: , Rfl:     cyanocobalamin (VITAMIN B-12) 100 mcg tablet, Take 50 mcg by mouth daily, Disp: , Rfl:     LORazepam (ATIVAN) 0 5 mg tablet, Take 1 tablet (0 5 mg total) by mouth daily at bedtime as needed for anxiety, Disp: 30 tablet, Rfl: 5    mesalamine (CANASA) 1,000 mg suppository, Insert 1 suppository (1,000 mg total) into the rectum daily at bedtime for 180 days, Disp: 30 suppository, Rfl: 5    Turmeric 500 MG CAPS, Take 1 capsule by mouth daily, Disp: , Rfl:     simvastatin (ZOCOR) 20 mg tablet, Take 1 tablet (20 mg total) by mouth daily at bedtime for 180 days (Patient taking differently: Take 20 mg by mouth every other day ), Disp: 90 tablet, Rfl: 1    tamoxifen (NOLVADEX) 20 mg tablet, Take 20 mg by mouth daily  , Disp: , Rfl:      Physical Exam   Constitutional: She is oriented to person, place, and time  She appears well-developed and well-nourished  HENT:   Head: Normocephalic and atraumatic  Right Ear: External ear normal    Left Ear: External ear normal    Nose: Nose normal    Mouth/Throat: Oropharynx is clear and moist    Eyes: Pupils are equal, round, and reactive to light  Conjunctivae and EOM are normal    Neck: Normal range of motion  Neck supple  Cardiovascular: Normal rate, regular rhythm, normal heart sounds and intact distal pulses  Pulmonary/Chest: Effort normal and breath sounds normal    Abdominal: Soft  Bowel sounds are normal    Neurological: She is alert and oriented to person, place, and time  She has normal reflexes  Skin: Skin is warm and dry  Psychiatric: She has a normal mood and affect   Her behavior is normal  Judgment and thought content normal

## 2020-02-11 NOTE — TELEPHONE ENCOUNTER
Best contact number for patient: [phone]     Emergency Contact name and number:    Referring provider and telephone number:    Primary Care Provider Name and if affiliated with [facility]:     Reason for Appointment/Dx: tremor     Neurology Location patient would like to be seen: [facility] [place] only please     Order received? Yes                                                 Records Received? Yes    Have you ever seen another Neurologist?       No    Insurance Information    Insurance Name: Medicare A & B     ID/Policy #:    Secondary Insurance:    ID/Policy#: Workman's Comp/ Accident/ School  Information      Workman's Comp/Accident/School related?        No    If yes name of Insurance company:    Date of Injury:    Type of Injury:    [address] Name and Telephone Number:    Notes:                   Appointment date: 07/02/2020

## 2020-02-11 NOTE — PROGRESS NOTES
Assessment and Plan:     Problem List Items Addressed This Visit     None      Visit Diagnoses     Hypercholesteremia               Preventive health issues were discussed with patient, and age appropriate screening tests were ordered as noted in patient's After Visit Summary  Personalized health advice and appropriate referrals for health education or preventive services given if needed, as noted in patient's After Visit Summary  History of Present Illness:     Patient presents for Welcome to Medicare visit  Patient Care Team:  Murray Cuba DO as PCP - General  DO Dorie Loera MD Guinevere Bonds, MD     Review of Systems:     Review of Systems   Constitutional: Negative  HENT: Negative  Eyes: Negative  Respiratory: Negative  Cardiovascular: Negative  Gastrointestinal: Negative  Genitourinary: Negative  Musculoskeletal: Negative  Skin: Negative  Neurological: Negative  Psychiatric/Behavioral: The patient is nervous/anxious  Gets agitated with         Problem List:     Patient Active Problem List   Diagnosis    Contusion of left elbow    Essential hypertension    Current mild episode of major depressive disorder without prior episode [facility])      Past Medical and Surgical History:     Past Medical History:   Diagnosis Date    Allergic     Anxiety     Arthritis     Bulging discs     Cancer ([address] ) Breast    R breast    Chronic interstitial cystitis     Colon polyp     Diverticulosis     Elevated amylase     Last Assessed:  3/24/15    Fibromyalgia, primary     GERD (gastroesophageal reflux disease)     Hyperlipidemia     Hypertension     Irritable bowel syndrome     Migraine     Pelvic floor dysfunction     Pneumonia     Psychiatric disorder     Ribs, multiple fractures     2/2019    Serum lipase elevation     Last Assessed:  9/17/13    Vitamin D deficiency      Past Surgical History:   Procedure Laterality Date    BAND HEMORRHOIDECTOMY      internal    BREAST LUMPECTOMY Right 10/2016    CHOLECYSTECTOMY      COLONOSCOPY      2016, "very difficult exam" per H&P    TONSILLECTOMY      TOOTH EXTRACTION      TUBAL LIGATION        Family History:     Family History   Problem Relation Age of Onset    Hypertension Mother     Heart disease Father     Hypertension Father     Stroke Father         Syndrome      Social History:        Social History     Socioeconomic History    Marital status: /Civil Union     Spouse name: None    Number of children: None    Years of education: None    Highest education level: None   Occupational History    Occupation: Retired   Social Needs    Financial resource strain: None    Food insecurity:     Worry: None     Inability: None    Transportation needs:     Medical: None     Non-medical: None   Tobacco Use    Smoking status: Former Smoker     Types: Cigarettes     Last attempt to quit:      Years since quittin     Smokeless tobacco: Never Used   Substance and Sexual Activity    Alcohol use: Yes     Comment: social    Drug use: No     Comment: CBD oil with tea at night    Sexual activity: None   Lifestyle    Physical activity:     Days per week: None     Minutes per session: None    Stress: None   Relationships    Social connections:     Talks on phone: None     Gets together: None     Attends Jew service: None     Active member of club or organization: None     Attends meetings of clubs or organizations: None     Relationship status: None    Intimate partner violence:     Fear of current or ex partner: None     Emotionally abused: None     Physically abused: None     Forced sexual activity: None   Other Topics Concern    None   Social History Narrative    Uses safety equipment      Medications and Allergies:     Current Outpatient Medications   Medication Sig Dispense Refill    acetaminophen (TYLENOL) 650 mg CR tablet Take 650 mg by mouth every 8 (eight) hours as needed      amLODIPine (NORVASC) 10 mg tablet Take 1 tablet (10 mg total) by mouth daily 90 tablet 1    aspirin (ECOTRIN LOW STRENGTH) 81 mg EC tablet Take 81 mg by mouth daily        benazepril (LOTENSIN) 10 mg tablet Take 1 tablet (10 mg total) by mouth daily 90 tablet 1    CALCIUM CITRATE PO Take by mouth      Calcium Glycerophosphate (PRELIEF PO) Take 2 tablets by mouth as needed      cholecalciferol (VITAMIN D3) 1,000 units tablet Take 1,000 Units by mouth daily        cyanocobalamin (VITAMIN B-12) 100 mcg tablet Take 50 mcg by mouth daily      LORazepam (ATIVAN) 0 5 mg tablet Take 1 tablet (0 5 mg total) by mouth daily at bedtime as needed for anxiety 30 tablet 5    mesalamine (CANASA) 1,000 mg suppository Insert 1 suppository (1,000 mg total) into the rectum daily at bedtime for 180 days 30 suppository 5    Turmeric 500 MG CAPS Take 1 capsule by mouth daily      simvastatin (ZOCOR) 20 mg tablet Take 1 tablet (20 mg total) by mouth daily at bedtime for 180 days (Patient taking differently: Take 20 mg by mouth every other day ) 90 tablet 1    tamoxifen (NOLVADEX) 20 mg tablet Take 20 mg by mouth daily         No current facility-administered medications for this visit        Allergies   Allergen Reactions    Bactrim [Sulfamethoxazole-Trimethoprim]     Lyrica [Pregabalin]     Pneumovax 23 [Pneumococcal Vac Polyvalent]     Pollen Extract     Zithromax [Azithromycin]       Immunizations:     Immunization History   Administered Date(s) Administered    INFLUENZA 12/09/2014, 10/05/2015, 10/05/2015, 10/04/2016    Influenza Split High Dose Preservative Free IM 10/04/2016    Influenza, high dose seasonal 0 5 mL 10/11/2019    Rabies, Intramuscular 03/17/2016, 03/20/2016      Health Maintenance:         Topic Date Due    Cervical Cancer Screening  08/04/1966    MAMMOGRAM  07/26/2020    Hepatitis C Screening  Completed         Topic Date Due    DTaP,Tdap,and Td Vaccines (1 - Tdap) [birth year]04/1957    Pneumococcal Vaccine: 65+ Years (1 of 2 - PCV13) 08/04/2011      Medicare Screening Tests and Risk Assessments:     Brad Yao is here for her Subsequent Wellness visit  Health Risk Assessment:   Patient rates overall health as good  Patient feels that their physical health rating is much worse  Eyesight was rated as slightly worse  Hearing was rated as Same  Patient feels that their emotional and mental health rating is slightly worse  Pain experienced in the last 7 days has been some  Patient's pain rating has been 5/10  Patient states that she has experienced weight loss or gain in last 6 months  Fall Risk Screening: In the past year, patient has experienced: no history of falling in past year      Urinary Incontinence Screening:   Patient has not leaked urine accidently in the last six months  Home Safety:  Patient does not have trouble with stairs inside or outside of their home  Patient has working smoke alarms and has no working carbon monoxide detector  Home safety hazards include: none  Nutrition:   Current diet is Regular and Low Carb  Medications:   Patient is currently taking over-the-counter supplements  OTC medications include: see medication list  Patient is able to manage medications  Activities of Daily Living (ADLs)/Instrumental Activities of Daily Living (IADLs):   Walk and transfer into and out of bed and chair?: Yes  Dress and groom yourself?: Yes    Bathe or shower yourself?: Yes    Feed yourself? Yes  Do your laundry/housekeeping?: Yes  Manage your money, pay your bills and track your expenses?: Yes  Make your own meals?: Yes    Do your own shopping?: Yes    Previous Hospitalizations:   Any hospitalizations or ED visits within the last 12 months?: No      Advance Care Planning:   Living will: No    Durable POA for healthcare:  Yes    Advanced directive: No      PREVENTIVE SCREENINGS      Cardiovascular Screening:    General: Screening Not Indicated and History Lipid Disorder      Diabetes Screening:     General: Screening Current      Colorectal Cancer Screening:     General: Screening Current      Breast Cancer Screening:     General: History Breast Cancer      Cervical Cancer Screening:    General: Screening Not Indicated      Osteoporosis Screening:    General: Screening Current      Abdominal Aortic Aneurysm (AAA) Screening:        General: Screening Not Indicated      Lung Cancer Screening:     General: Screening Not Indicated      Hepatitis C Screening:    General: Screening Current      Preventive Screening Comments: Had an ADR to Pneumonia vaccine  No exam data present     Physical Exam:     /76 (BP Location: Left arm, Patient Position: Sitting, Cuff Size: Standard)   Pulse 73   Temp 98 1 °F (36 7 °C) (Oral)   Resp 15   Ht 5' 4" (1 626 m)   Wt 60 9 kg (134 lb 3 2 oz)   LMP  (LMP Unknown)   SpO2 97%   BMI 23 04 kg/m²     Physical Exam   Constitutional: She is oriented to person, place, and time  She appears well-developed and well-nourished  HENT:   Head: Normocephalic and atraumatic  Right Ear: External ear normal    Left Ear: External ear normal    Nose: Nose normal    Mouth/Throat: Oropharynx is clear and moist    Eyes: Pupils are equal, round, and reactive to light  Conjunctivae and EOM are normal    Neck: Normal range of motion  Neck supple  Cardiovascular: Normal rate, regular rhythm, normal heart sounds and intact distal pulses  Pulmonary/Chest: Effort normal and breath sounds normal    Abdominal: Soft  Bowel sounds are normal    Neurological: She is alert and oriented to person, place, and time  She has normal reflexes  Mild tremor at rest    Skin: Skin is warm and dry  Psychiatric: She has a normal mood and affect   Her behavior is normal  Judgment and thought content normal        Murray Pradhan DO

## 2020-02-11 NOTE — TELEPHONE ENCOUNTER
Upon review of the In Basket request we were able to locate, review, and update the patient chart as requested for Pap Smear (HPV) aka Cervical Cancer Screening  Any additional questions or concerns should be emailed to the Practice Liaisons via [email or web address] email, please do not reply via In Basket      Thank you  Richard Ramos

## 2020-02-11 NOTE — TELEPHONE ENCOUNTER
----- Message from Cynthia Guillen sent at 2/11/2020 12:13 PM EST -----  Regarding: Cervical Cancer Screening   Contact: [phone]  02/11/20 12:13 PM    Hello, our patient Charbel Morgan has had Pap Smear (HPV) aka Cervical Cancer Screening completed/performed  Please assist in updating the patient chart by pulling the document from Labs Tab within Chart Review  The date of service is 01/21/2020       Thank you,  Cynthia HILL

## 2020-02-17 ENCOUNTER — TELEPHONE (OUTPATIENT)
Dept: NEUROLOGY | Facility: CLINIC | Age: 75
End: 2020-02-17

## 2020-02-18 ENCOUNTER — CONSULT (OUTPATIENT)
Dept: NEUROLOGY | Facility: CLINIC | Age: 75
End: 2020-02-18
Payer: MEDICARE

## 2020-02-18 VITALS
DIASTOLIC BLOOD PRESSURE: 78 MMHG | SYSTOLIC BLOOD PRESSURE: 130 MMHG | HEART RATE: 65 BPM | BODY MASS INDEX: 23.05 KG/M2 | WEIGHT: 135 LBS | HEIGHT: 64 IN

## 2020-02-18 DIAGNOSIS — R25.1 TREMOR: Primary | ICD-10-CM

## 2020-02-18 PROCEDURE — 3075F SYST BP GE 130 - 139MM HG: CPT | Performed by: PSYCHIATRY & NEUROLOGY

## 2020-02-18 PROCEDURE — 99204 OFFICE O/P NEW MOD 45 MIN: CPT | Performed by: PSYCHIATRY & NEUROLOGY

## 2020-02-18 PROCEDURE — 1170F FXNL STATUS ASSESSED: CPT | Performed by: PSYCHIATRY & NEUROLOGY

## 2020-02-18 PROCEDURE — 1036F TOBACCO NON-USER: CPT | Performed by: PSYCHIATRY & NEUROLOGY

## 2020-02-18 PROCEDURE — 3078F DIAST BP <80 MM HG: CPT | Performed by: PSYCHIATRY & NEUROLOGY

## 2020-02-18 PROCEDURE — 3008F BODY MASS INDEX DOCD: CPT | Performed by: PSYCHIATRY & NEUROLOGY

## 2020-02-18 PROCEDURE — 1160F RVW MEDS BY RX/DR IN RCRD: CPT | Performed by: PSYCHIATRY & NEUROLOGY

## 2020-02-18 NOTE — PATIENT INSTRUCTIONS
It was very nice to meet you today     I think your tremor is most likely related to either a medication side effect or something called "essential tremor"     Two medications which can help with sleep but don't have negative impacts on thinking or memory  Mirtazapine or Trazodone

## 2020-02-18 NOTE — PROGRESS NOTES
Assessment/Plan:    Tremor  Tahir Rubio he is a 81-year-old right-handed woman who presents for evaluation of tremor which began sometime in the past year  The patient has an action tremor in the bilateral upper extremities, slightly worse on the right than left  She has no evidence of parkinsonism other than a slight reduction in arm swing on the right side  We discussed possible etiologies for her tremor including medication side effect, enhanced physiologic tremor or essential tremor  We discussed treatment options for tremor including potential side effects  She would like to avoid adding on medications to treat side effects other medicines  I encouraged her to return to clinic if she finds her tremor worsening, emergence of new neurologic symptoms or if things do not improve once her course of tamoxifen has been completed  The patient reports anxiety and insomnia period was on TID dosing of Ativan in the past which has been reduced to just at bedtime  We discussed some of the potential down sides of long-term benzodiazepine use  Discussed potential benefits of seeking out mental health care for management of her anxiety and insomnia as these may be contributing to her tremor  Could consider mirtazapine in the place Ativan there is some data this can reduce tremor in addition to helping her sleep, anxiety and decreased appetite  Diagnoses and all orders for this visit:    Tremor        Subjective:     Patient ID: Tahir Rubio is a 69 y o  female  I had the pleasure of seeing your patient, Tahir Rubio in the [facility] at the [facility]  The patient is a 69 y o  right handed female who presents for evaluation of tremor  The patient presents with her   They think the tremor started somewhere between 6 and 12 months ago  The patient notices the tremor on her dominant side with most activities    Specifically she notices tremor while drinking coffee in the morning and while texting  Also notices some mild balance troubles  The patient reports that her radiation oncologist checked a Myerson sign and recommended she be evaluated by a neurologist   She brought these concerns for Parkinson's disease to her primary care doctor who facilitated this referral   Tamoxifen was started 3 years ago  The patient does not feel any functional limitations from her tremor  She works part-time as a  at [employer]  She has never been evaluated by a neurologist in the past   She has never been on any medications for tremors in the past   She denies any neuroleptics exposures  She does have a paternal grandfather who had tremor and dementia  Her father had Alzheimer's disease  Regarding motor symptoms:   Tremor: never at rest  On the right when in use   Slowness: lots of fatigue over the past few years  ADLs are not slower  Stiffness: no   Dystonia: no   Changes in facial expression: no   Changes in voice: sometimes can be soft but also some hearing troubles   Changes in writing: messy, small   Changes in gait: just doesn't feel right  Mostly at a distance  Falls: no   Freezing: sometimes  "Just feels stiff or somethin"  Trouble with swallowing: no   Clumsiness/dexterity: no     Regarding non-motor symptoms:   Anosmia: no   Constipation: comes and goes  Has UC  Lightheadedness: no   Changes in Mood: depression and anxiety for the past couple of years  Edgy  Reports as significant  Didn't do well with zoloft in the past   Trouble with sleep:     REM behavior Disorder: isolated jumps,     Coffee: Decalf only   Alcohol: occasional, helps the tremor some         The following portions of the patient's history were reviewed and updated as appropriate: allergies, current medications, past family history, past medical history, past social history, past surgical history and problem list       Objective:  /78 (BP Location: Left arm, Patient Position: Standing, Cuff Size: Standard)   Pulse 65   Ht 5' 4" (1 626 m)   Wt 61 2 kg (135 lb)   LMP  (LMP Unknown)   BMI 23 17 kg/m²     Physical Exam    Neurological Exam     GENERAL MEDICAL EXAMINATION:  General appearance: alert, in no apparent distress  Appropriately dressed and groomed  Conversing and interacting appropriately  Eyes: Sclera are non-injected  Ears, nose, Mouth, Throat: Mucous membranes are moist    Resp: Breathing comfortably on RA   Musculoskeletal: No evidence of deformities  No contractures  No Edema  Skin: No visible rashes  Warm and well perfused  Psych: normal and appropriate affect     NEUROLOGICAL EXAMINATION:  Mental Status: Alert and oriented to person place and time  Able to relate history without difficulty  Attentive: able to name TREY backward without difficulty  Language is fluent and appropriate with normal prosody  There were no paraphasic errors  Speech was not dysarthric  There was no pallilalia noted  Able to follow both midline and appendicular commands  Cranial Nerves:  II:  pupils equally round and briskly reactive to light, both directly and consensually  Visual fields full to finger counting  III-IV-VI: Full and conjugate, extra-ocular eye movements in all directions of gaze  No nystagmus or diplopia  Intact smooth pursuit  VII: Face is symmetric at rest and with activation; symmetric speed and excursion with smile  IX-X: Palate elevates symmetrically  XII: No tongue deviation or fasciculations    Motor:   Normal muscle bulk throughout  There were no dyskinesias or dystonia noted  No pronator drift or rebound  No asterixis or myoclonus noted      UPDRS motor:                              Time since last dose:  x     Speech  0     Facial Expression  0     Rigidity - Neck  0     Rigidity - Upper Extremity (Right)  0     Rigidity - Upper Extremity (Left)   0     Rigidity - Lower Extremity (Right)  0     Rigidity - Lower Extremity (Left)   0 Finger Taps (Right)   0     Finger Taps (Left)   0     Hand Movement (Right)  0     Hand Movement (Left)   0     Pronation/Supination (Right)  0     Pronation/Supination (Left)   0     Toe Tapping (Right) 0     Toe Tapping (Left) 0     Leg Agility (Right)  0     Leg Agility (Left)   0     Arising from Chair   0     Gait   0     Freezing of Gait 0     Postural Stability   0     Posture 0     Global spontaneity of movement 0     Postural Tremor (Right) 1     Postural Tremor (Left) 1     Kinetic Tremor (Right)  1     Kinetic Tremor (Left)  1 Maybe just over 1 cm     Rest tremor amplitude RUE 0     Rest tremor amplitude LUE 0     Rest tremor amplitude RLE 0     Reset tremor amplitude LLE 0     Lip/Jaw Tremor  0     Consistency of tremor 0     Motor Exam Total:            Archimedes spiral show mild tremor, slighly R>L  and hand witting was normal      Strength:   Delt Bi Tri We FE FF    C5 C6 C7 C6 C7 C8/T1   R 5 5 5 5 5 5   L 5 5 5 5 5 5      IP Quad Hamst TA    L2 L3 L4-S1 L4   R 5 5 5 5   L 5 5 5 5     Reflexes:  2+ and symmetric throughout    Sensory: normal and symmetric perception of light touch, vibration and temperature  Coordination: Finger to nose without dysmetria bilaterally  No intention tremor  Gait: Able to rise from a chair without the use of arms  A little less arm swing on the right but otherwise ok  Slightly antalgic vs a small Trendelenburg component    Review of Systems   Constitutional: Positive for chills and fatigue  Negative for appetite change and fever  HENT: Positive for sinus pressure and sinus pain  Negative for hearing loss, tinnitus, trouble swallowing and voice change  Eyes: Negative  Negative for photophobia and pain  Respiratory: Negative  Negative for shortness of breath  Cardiovascular: Negative  Negative for palpitations  Gastrointestinal: Negative  Negative for nausea and vomiting  Endocrine: Negative  Negative for cold intolerance and heat intolerance  Loss of sexual drive     Genitourinary: Positive for frequency  Negative for dysuria and urgency  Musculoskeletal: Positive for back pain, gait problem (balance problems), joint swelling and myalgias  Negative for neck pain  Skin: Negative  Negative for rash  Neurological: Positive for tremors (right hand) and headaches  Negative for dizziness, seizures, syncope, facial asymmetry, speech difficulty, weakness, light-headedness and numbness  Tingling  Clumsiness     Hematological: Negative  Does not bruise/bleed easily  Psychiatric/Behavioral: Negative for confusion, hallucinations and sleep disturbance (trouble falling asleep and waking up at night)  The patient is nervous/anxious  Depression  Mood swings     The above ROS was reviewed and updated       Adolfo Scott MD  Medical Director   [facility]  [facility]       Current Outpatient Medications on File Prior to Visit   Medication Sig Dispense Refill    acetaminophen (TYLENOL) 650 mg CR tablet Take 650 mg by mouth every 8 (eight) hours as needed      amLODIPine (NORVASC) 10 mg tablet Take 1 tablet (10 mg total) by mouth daily 90 tablet 1    aspirin (ECOTRIN LOW STRENGTH) 81 mg EC tablet Take 81 mg by mouth daily        benazepril (LOTENSIN) 10 mg tablet Take 1 tablet (10 mg total) by mouth daily 90 tablet 1    CALCIUM CITRATE PO Take by mouth      Calcium Glycerophosphate (PRELIEF PO) Take 2 tablets by mouth as needed      cholecalciferol (VITAMIN D3) 1,000 units tablet Take 1,000 Units by mouth daily        cyanocobalamin (VITAMIN B-12) 100 mcg tablet Take 50 mcg by mouth daily      LORazepam (ATIVAN) 0 5 mg tablet Take 1 tablet (0 5 mg total) by mouth daily at bedtime as needed for anxiety 30 tablet 5    mesalamine (CANASA) 1,000 mg suppository Insert 1 suppository (1,000 mg total) into the rectum daily at bedtime for 180 days 30 suppository 5    simvastatin (ZOCOR) 20 mg tablet Take 1 tablet (20 mg total) by mouth daily at bedtime (Patient taking differently: Take 20 mg by mouth 3 times a week) 90 tablet 1    tamoxifen (NOLVADEX) 20 mg tablet Take 20 mg by mouth daily        Turmeric 500 MG CAPS Take 1 capsule by mouth daily      levocetirizine (XYZAL) 5 MG tablet Take 1 tablet (5 mg total) by mouth every evening (Patient not taking: Reported on 2/18/2020) 30 tablet 5     No current facility-administered medications on file prior to visit

## 2020-02-18 NOTE — ASSESSMENT & PLAN NOTE
Lizette Hess he is a 18-year-old right-handed woman who presents for evaluation of tremor which began sometime in the past year  The patient has an action tremor in the bilateral upper extremities, slightly worse on the right than left  She has no evidence of parkinsonism other than a slight reduction in arm swing on the right side  We discussed possible etiologies for her tremor including medication side effect, enhanced physiologic tremor or essential tremor  We discussed treatment options for tremor including potential side effects  She would like to avoid adding on medications to treat side effects other medicines  I encouraged her to return to clinic if she finds her tremor worsening, emergence of new neurologic symptoms or if things do not improve once her course of tamoxifen has been completed  The patient reports anxiety and insomnia period was on TID dosing of Ativan in the past which has been reduced to just at bedtime  We discussed some of the potential down sides of long-term benzodiazepine use  Discussed potential benefits of seeking out mental health care for management of her anxiety and insomnia as these may be contributing to her tremor  Could consider mirtazapine in the place Ativan there is some data this can reduce tremor in addition to helping her sleep, anxiety and decreased appetite

## 2020-02-26 ENCOUNTER — TELEPHONE (OUTPATIENT)
Dept: FAMILY MEDICINE CLINIC | Facility: CLINIC | Age: 75
End: 2020-02-26

## 2020-02-26 NOTE — TELEPHONE ENCOUNTER
The virus in question, "Corona", is not related to Pneumococcal vaccine  There is no vaccine for [facility] as of yet

## 2020-02-26 NOTE — TELEPHONE ENCOUNTER
Patient had reaction to pneumonia vaccine approximately 15 years ago  She states she had upper arm redness and swelling where she received the injection  She states since 80 people in [place] are being investigated for Coronavirus, should she try to get another pneumonia vaccine

## 2020-02-27 NOTE — TELEPHONE ENCOUNTER
Called patient and gave her message from provider  Stated provider does not recommend she have pneumonia vaccine due to previous reaction and that corona virus is not related to the pneumonia vaccine  She stated she understood instructions

## 2020-04-27 DIAGNOSIS — I10 ESSENTIAL HYPERTENSION: ICD-10-CM

## 2020-04-27 RX ORDER — BENAZEPRIL HYDROCHLORIDE 10 MG/1
10 TABLET ORAL DAILY
Qty: 90 TABLET | Refills: 1 | Status: SHIPPED | OUTPATIENT
Start: 2020-04-27 | End: 2020-10-19 | Stop reason: SDUPTHER

## 2020-05-26 DIAGNOSIS — N31.9 NEUROMUSCULAR DYSFUNCTION OF BLADDER: ICD-10-CM

## 2020-05-26 RX ORDER — LORAZEPAM 0.5 MG/1
0.5 TABLET ORAL
Qty: 30 TABLET | Refills: 5 | Status: SHIPPED | OUTPATIENT
Start: 2020-05-26 | End: 2020-12-01 | Stop reason: SDUPTHER

## 2020-06-15 ENCOUNTER — OFFICE VISIT (OUTPATIENT)
Dept: FAMILY MEDICINE CLINIC | Facility: CLINIC | Age: 75
End: 2020-06-15
Payer: MEDICARE

## 2020-06-15 VITALS
OXYGEN SATURATION: 97 % | TEMPERATURE: 98.1 F | WEIGHT: 136 LBS | RESPIRATION RATE: 16 BRPM | SYSTOLIC BLOOD PRESSURE: 132 MMHG | DIASTOLIC BLOOD PRESSURE: 86 MMHG | HEIGHT: 64 IN | HEART RATE: 76 BPM | BODY MASS INDEX: 23.22 KG/M2

## 2020-06-15 DIAGNOSIS — J30.2 SEASONAL ALLERGIES: ICD-10-CM

## 2020-06-15 DIAGNOSIS — G47.00 INSOMNIA, UNSPECIFIED TYPE: ICD-10-CM

## 2020-06-15 DIAGNOSIS — I10 ESSENTIAL HYPERTENSION: ICD-10-CM

## 2020-06-15 DIAGNOSIS — F32.0 CURRENT MILD EPISODE OF MAJOR DEPRESSIVE DISORDER WITHOUT PRIOR EPISODE (HCC): Primary | ICD-10-CM

## 2020-06-15 DIAGNOSIS — F43.9 STRESS AT HOME: ICD-10-CM

## 2020-06-15 DIAGNOSIS — E78.00 HYPERCHOLESTEREMIA: ICD-10-CM

## 2020-06-15 PROCEDURE — 3079F DIAST BP 80-89 MM HG: CPT | Performed by: FAMILY MEDICINE

## 2020-06-15 PROCEDURE — 3075F SYST BP GE 130 - 139MM HG: CPT | Performed by: FAMILY MEDICINE

## 2020-06-15 PROCEDURE — 3008F BODY MASS INDEX DOCD: CPT | Performed by: FAMILY MEDICINE

## 2020-06-15 PROCEDURE — 1160F RVW MEDS BY RX/DR IN RCRD: CPT | Performed by: FAMILY MEDICINE

## 2020-06-15 PROCEDURE — 1036F TOBACCO NON-USER: CPT | Performed by: FAMILY MEDICINE

## 2020-06-15 PROCEDURE — 99214 OFFICE O/P EST MOD 30 MIN: CPT | Performed by: FAMILY MEDICINE

## 2020-06-15 RX ORDER — TRAZODONE HYDROCHLORIDE 50 MG/1
50 TABLET ORAL
Qty: 890 TABLET | Refills: 1 | Status: SHIPPED | OUTPATIENT
Start: 2020-06-15 | End: 2020-10-19

## 2020-06-15 RX ORDER — AMLODIPINE BESYLATE 10 MG/1
10 TABLET ORAL DAILY
Qty: 90 TABLET | Refills: 1 | Status: SHIPPED | OUTPATIENT
Start: 2020-06-15 | End: 2020-10-19 | Stop reason: SDUPTHER

## 2020-06-15 RX ORDER — LACTOBACILLUS RHAMNOSUS GG 10B CELL
1 CAPSULE ORAL
COMMUNITY
End: 2020-10-19

## 2020-08-25 ENCOUNTER — OFFICE VISIT (OUTPATIENT)
Dept: URGENT CARE | Age: 75
End: 2020-08-25
Payer: MEDICARE

## 2020-08-25 VITALS
RESPIRATION RATE: 18 BRPM | WEIGHT: 132 LBS | HEIGHT: 64 IN | HEART RATE: 78 BPM | SYSTOLIC BLOOD PRESSURE: 142 MMHG | OXYGEN SATURATION: 98 % | TEMPERATURE: 99.2 F | DIASTOLIC BLOOD PRESSURE: 65 MMHG | BODY MASS INDEX: 22.53 KG/M2

## 2020-08-25 DIAGNOSIS — T63.441A BEE STING REACTION, ACCIDENTAL OR UNINTENTIONAL, INITIAL ENCOUNTER: Primary | ICD-10-CM

## 2020-08-25 PROCEDURE — 99213 OFFICE O/P EST LOW 20 MIN: CPT | Performed by: FAMILY MEDICINE

## 2020-08-25 PROCEDURE — G0463 HOSPITAL OUTPT CLINIC VISIT: HCPCS | Performed by: FAMILY MEDICINE

## 2020-08-25 RX ORDER — METHYLPREDNISOLONE 4 MG/1
TABLET ORAL
Qty: 21 TABLET | Refills: 0 | Status: SHIPPED | OUTPATIENT
Start: 2020-08-25 | End: 2020-10-19

## 2020-08-25 NOTE — PATIENT INSTRUCTIONS
1-2 Benadryl 25 mg (25-50 mg) every 4-6 hours as needed (may cause sedation)  Medrol Dosepak as directed (please take with food)  Rest, elevate right ankle, limit activity  Ice to area 2 to 3 times a day for 15-20 minutes  Recheck/follow-up with family physician as discussed  Please go to the hospital emergency department if needed

## 2020-08-25 NOTE — PROGRESS NOTES
[facility]        NAME: Elkin Tidwell is a 77 y o  female  : 1945    MRN: [id number]  DATE: 2020  TIME: 7:05 PM    Assessment and Plan   Bee sting reaction, accidental or unintentional, initial encounter [T63 [id number]]  1  Bee sting reaction, accidental or unintentional, initial encounter  methylPREDNISolone 4 MG tablet therapy pack         Patient Instructions     Patient Instructions   1-2 Benadryl 25 mg (25-50 mg) every 4-6 hours as needed (may cause sedation)  Medrol Dosepak as directed (please take with food)  Rest, elevate right ankle, limit activity  Ice to area 2 to 3 times a day for 15-20 minutes  Recheck/follow-up with family physician as discussed  Please go to the hospital emergency department if needed  Follow up with PCP in 1-2 days  Proceed to  ER if symptoms worsen  Chief Complaint     Chief Complaint   Patient presents with    Insect Bite     bee sting to right ankle last night    swelling and pain today    felt tight in her chest         History of Present Illness       Patient states she was stung by a bee on her right ankle last evening (2020); pain, swelling, and slight redness of the area; patient denies swelling of her lips, tongue, or throat; patient states some tightness in her chest; patient states she took Benadryl 25 mg prior to arrival      Review of Systems   Review of Systems   HENT:        No swelling of the lips, tongue, or throat   Respiratory: Positive for chest tightness  Negative for shortness of breath  Cardiovascular: Negative for chest pain     Skin:        Bee sting lateral aspect of the right ankle with pain, swelling, and slight redness of area         Current Medications       Current Outpatient Medications:     acetaminophen (TYLENOL) 650 mg CR tablet, Take 650 mg by mouth every 8 (eight) hours as needed, Disp: , Rfl:     amLODIPine (NORVASC) 10 mg tablet, Take 1 tablet (10 mg total) by mouth daily, Disp: 90 tablet, Rfl: 1    aspirin (ECOTRIN LOW STRENGTH) 81 mg EC tablet, Take 81 mg by mouth daily  , Disp: , Rfl:     benazepril (LOTENSIN) 10 mg tablet, Take 1 tablet (10 mg total) by mouth daily, Disp: 90 tablet, Rfl: 1    CALCIUM CITRATE PO, Take by mouth, Disp: , Rfl:     Calcium Glycerophosphate (PRELIEF PO), Take 2 tablets by mouth as needed, Disp: , Rfl:     cholecalciferol (VITAMIN D3) 1,000 units tablet, Take 1,000 Units by mouth daily  , Disp: , Rfl:     cyanocobalamin (VITAMIN B-12) 100 mcg tablet, Take 50 mcg by mouth daily, Disp: , Rfl:     Lactobacillus Rhamnosus, GG, (CULTURELLE KIDS) CHEW, Chew 1 tablet, Disp: , Rfl:     LORazepam (ATIVAN) 0 5 mg tablet, Take 1 tablet (0 5 mg total) by mouth daily at bedtime as needed for anxiety, Disp: 30 tablet, Rfl: 5    simvastatin (ZOCOR) 20 mg tablet, Take 1 tablet (20 mg total) by mouth daily at bedtime (Patient taking differently: Take 20 mg by mouth 4 times a week), Disp: 90 tablet, Rfl: 1    tamoxifen (NOLVADEX) 20 mg tablet, Take 20 mg by mouth daily  , Disp: , Rfl:     levocetirizine (XYZAL) 5 MG tablet, Take 1 tablet (5 mg total) by mouth every evening (Patient not taking: Reported on 8/25/2020), Disp: 30 tablet, Rfl: 5    mesalamine (CANASA) 1,000 mg suppository, Insert 1 suppository (1,000 mg total) into the rectum daily at bedtime for 180 days, Disp: 30 suppository, Rfl: 5    methylPREDNISolone 4 MG tablet therapy pack, Use as directed on package, Disp: 21 tablet, Rfl: 0    traZODone (DESYREL) 50 mg tablet, Take 1 tablet (50 mg total) by mouth daily at bedtime (Patient not taking: Reported on 8/25/2020), Disp: 890 tablet, Rfl: 1    Turmeric 500 MG CAPS, Take 1 capsule by mouth daily, Disp: , Rfl:     Current Allergies     Allergies as of 08/25/2020 - Reviewed 08/25/2020   Allergen Reaction Noted    Bactrim [sulfamethoxazole-trimethoprim]  03/17/2016    Lyrica [pregabalin]  03/17/2016    Pneumovax 23 [pneumococcal vac polyvalent]  03/17/2016    Pollen extract  12/11/2012    Prevnar 13 [pneumococcal 13-nazia conj vacc] Swelling 02/11/2020    Zithromax [azithromycin]  03/17/2016            The following portions of the patient's history were reviewed and updated as appropriate: allergies, current medications, past family history, past medical history, past social history, past surgical history and problem list      Past Medical History:   Diagnosis Date    Allergic     Anxiety     Arthritis     Bulging discs     Cancer ([address] ) Breast    R breast    Chronic interstitial cystitis     Colon polyp     Diverticulosis     Elevated amylase     Last Assessed:  3/24/15    Fibromyalgia, primary     GERD (gastroesophageal reflux disease)     Hyperlipidemia     Hypertension     Irritable bowel syndrome     Migraine     Pelvic floor dysfunction     Pneumonia     Psychiatric disorder     Ribs, multiple fractures     2/2019    Serum lipase elevation     Last Assessed:  9/17/13    Vitamin D deficiency        Past Surgical History:   Procedure Laterality Date    BAND HEMORRHOIDECTOMY      internal    BREAST LUMPECTOMY Right 10/2016    CHOLECYSTECTOMY      COLONOSCOPY      2016, "very difficult exam" per H&P    TONSILLECTOMY      TOOTH EXTRACTION      TUBAL LIGATION         Family History   Problem Relation Age of Onset    Hypertension Mother     Heart disease Father     Hypertension Father     Stroke Father         Syndrome         Medications have been verified  Objective   /65   Pulse 78   Temp 99 2 °F (37 3 °C)   Resp 18   Ht 5' 4" (1 626 m)   Wt 59 9 kg (132 lb)   LMP  (LMP Unknown)   SpO2 98%   BMI 22 66 kg/m²        Physical Exam     Physical Exam  Vitals signs and nursing note reviewed  Constitutional:       Appearance: Normal appearance     HENT:      Mouth/Throat:      Mouth: Mucous membranes are moist       Comments: No swelling of lips, tongue, or throat  Neck:      Musculoskeletal: Normal range of motion and neck supple  Cardiovascular:      Rate and Rhythm: Normal rate and regular rhythm  Pulmonary:      Effort: Pulmonary effort is normal  No respiratory distress  Breath sounds: Normal breath sounds  No wheezing  Skin:     Comments: Bee sting of the lateral aspect of the right ankle with tenderness, swelling, and slight erythema of the area   Neurological:      Mental Status: She is alert and oriented to person, place, and time     Psychiatric:         Mood and Affect: Mood normal          Behavior: Behavior normal

## 2020-10-19 ENCOUNTER — OFFICE VISIT (OUTPATIENT)
Dept: FAMILY MEDICINE CLINIC | Facility: CLINIC | Age: 75
End: 2020-10-19
Payer: MEDICARE

## 2020-10-19 VITALS
OXYGEN SATURATION: 97 % | TEMPERATURE: 97.3 F | DIASTOLIC BLOOD PRESSURE: 62 MMHG | BODY MASS INDEX: 22.91 KG/M2 | HEART RATE: 80 BPM | WEIGHT: 134.2 LBS | SYSTOLIC BLOOD PRESSURE: 112 MMHG | HEIGHT: 64 IN

## 2020-10-19 DIAGNOSIS — E55.9 VITAMIN D DEFICIENCY: ICD-10-CM

## 2020-10-19 DIAGNOSIS — R53.83 TIREDNESS: ICD-10-CM

## 2020-10-19 DIAGNOSIS — E78.00 HYPERCHOLESTEREMIA: Primary | ICD-10-CM

## 2020-10-19 DIAGNOSIS — I10 ESSENTIAL HYPERTENSION: ICD-10-CM

## 2020-10-19 DIAGNOSIS — G47.00 INSOMNIA, UNSPECIFIED TYPE: ICD-10-CM

## 2020-10-19 DIAGNOSIS — F43.9 STRESS AT HOME: ICD-10-CM

## 2020-10-19 PROCEDURE — 99215 OFFICE O/P EST HI 40 MIN: CPT | Performed by: FAMILY MEDICINE

## 2020-10-19 RX ORDER — BENAZEPRIL HYDROCHLORIDE 10 MG/1
10 TABLET ORAL DAILY
Qty: 90 TABLET | Refills: 1 | Status: SHIPPED | OUTPATIENT
Start: 2020-10-19 | End: 2020-11-18 | Stop reason: DRUGHIGH

## 2020-10-19 RX ORDER — AMLODIPINE BESYLATE 10 MG/1
10 TABLET ORAL DAILY
Qty: 90 TABLET | Refills: 1 | Status: SHIPPED | OUTPATIENT
Start: 2020-10-19 | End: 2020-11-18 | Stop reason: DRUGHIGH

## 2020-10-19 RX ORDER — OMEPRAZOLE 20 MG/1
20 CAPSULE, DELAYED RELEASE ORAL DAILY
COMMUNITY
Start: 2020-10-16

## 2020-10-19 RX ORDER — RAMELTEON 8 MG/1
8 TABLET ORAL
Qty: 30 TABLET | Refills: 4 | Status: SHIPPED | OUTPATIENT
Start: 2020-10-19 | End: 2021-01-04 | Stop reason: ALTCHOICE

## 2020-11-18 ENCOUNTER — OFFICE VISIT (OUTPATIENT)
Dept: FAMILY MEDICINE CLINIC | Facility: CLINIC | Age: 75
End: 2020-11-18
Payer: MEDICARE

## 2020-11-18 VITALS
WEIGHT: 131.8 LBS | HEIGHT: 64 IN | TEMPERATURE: 97 F | SYSTOLIC BLOOD PRESSURE: 122 MMHG | BODY MASS INDEX: 22.5 KG/M2 | HEART RATE: 90 BPM | DIASTOLIC BLOOD PRESSURE: 68 MMHG | OXYGEN SATURATION: 98 %

## 2020-11-18 DIAGNOSIS — E78.00 HYPERCHOLESTEREMIA: ICD-10-CM

## 2020-11-18 DIAGNOSIS — I10 ESSENTIAL HYPERTENSION: ICD-10-CM

## 2020-11-18 DIAGNOSIS — K21.00 GASTROESOPHAGEAL REFLUX DISEASE WITH ESOPHAGITIS WITHOUT HEMORRHAGE: Primary | ICD-10-CM

## 2020-11-18 DIAGNOSIS — C50.911 BREAST CANCER, STAGE 1, RIGHT (HCC): ICD-10-CM

## 2020-11-18 PROBLEM — K22.719 BARRETT'S ESOPHAGUS WITH DYSPLASIA: Status: ACTIVE | Noted: 2020-11-18

## 2020-11-18 PROCEDURE — 99214 OFFICE O/P EST MOD 30 MIN: CPT | Performed by: FAMILY MEDICINE

## 2020-11-18 RX ORDER — AMLODIPINE BESYLATE 5 MG/1
5 TABLET ORAL DAILY
Qty: 40 TABLET | Refills: 0
Start: 2020-11-18 | End: 2020-12-02 | Stop reason: ALTCHOICE

## 2020-11-18 RX ORDER — BENAZEPRIL HYDROCHLORIDE 20 MG/1
20 TABLET ORAL DAILY
Qty: 90 TABLET | Refills: 0 | Status: SHIPPED | OUTPATIENT
Start: 2020-11-18 | End: 2020-12-21

## 2020-12-01 DIAGNOSIS — N31.9 NEUROMUSCULAR DYSFUNCTION OF BLADDER: ICD-10-CM

## 2020-12-01 RX ORDER — LORAZEPAM 0.5 MG/1
0.5 TABLET ORAL
Qty: 30 TABLET | Refills: 2 | Status: SHIPPED | OUTPATIENT
Start: 2020-12-01 | End: 2021-01-04 | Stop reason: ALTCHOICE

## 2020-12-02 ENCOUNTER — OFFICE VISIT (OUTPATIENT)
Dept: FAMILY MEDICINE CLINIC | Facility: CLINIC | Age: 75
End: 2020-12-02
Payer: MEDICARE

## 2020-12-02 VITALS
BODY MASS INDEX: 22.23 KG/M2 | WEIGHT: 130.2 LBS | HEIGHT: 64 IN | TEMPERATURE: 97.6 F | DIASTOLIC BLOOD PRESSURE: 80 MMHG | HEART RATE: 88 BPM | OXYGEN SATURATION: 98 % | SYSTOLIC BLOOD PRESSURE: 138 MMHG

## 2020-12-02 DIAGNOSIS — K22.719 BARRETT'S ESOPHAGUS WITH DYSPLASIA: ICD-10-CM

## 2020-12-02 DIAGNOSIS — I10 ESSENTIAL HYPERTENSION: Primary | ICD-10-CM

## 2020-12-02 PROCEDURE — 99213 OFFICE O/P EST LOW 20 MIN: CPT | Performed by: FAMILY MEDICINE

## 2020-12-21 ENCOUNTER — OFFICE VISIT (OUTPATIENT)
Dept: FAMILY MEDICINE CLINIC | Facility: CLINIC | Age: 75
End: 2020-12-21
Payer: MEDICARE

## 2020-12-21 VITALS
OXYGEN SATURATION: 98 % | WEIGHT: 129.8 LBS | HEART RATE: 74 BPM | HEIGHT: 64 IN | TEMPERATURE: 98 F | DIASTOLIC BLOOD PRESSURE: 82 MMHG | RESPIRATION RATE: 16 BRPM | SYSTOLIC BLOOD PRESSURE: 130 MMHG | BODY MASS INDEX: 22.16 KG/M2

## 2020-12-21 DIAGNOSIS — F41.8 ANXIETY ABOUT HEALTH: ICD-10-CM

## 2020-12-21 DIAGNOSIS — K22.719 BARRETT'S ESOPHAGUS WITH DYSPLASIA: ICD-10-CM

## 2020-12-21 DIAGNOSIS — F41.0 PANIC ATTACK: ICD-10-CM

## 2020-12-21 DIAGNOSIS — I10 ESSENTIAL HYPERTENSION: Primary | ICD-10-CM

## 2020-12-21 PROCEDURE — 99214 OFFICE O/P EST MOD 30 MIN: CPT | Performed by: FAMILY MEDICINE

## 2020-12-21 RX ORDER — BENAZEPRIL HYDROCHLORIDE 20 MG/1
20 TABLET ORAL DAILY
Qty: 180 TABLET | Refills: 1 | Status: SHIPPED | OUTPATIENT
Start: 2020-12-21 | End: 2020-12-21

## 2020-12-21 RX ORDER — SIMVASTATIN 20 MG
20 TABLET ORAL
COMMUNITY
End: 2021-03-22 | Stop reason: SDUPTHER

## 2020-12-21 RX ORDER — BENAZEPRIL HYDROCHLORIDE 20 MG/1
20 TABLET ORAL 2 TIMES DAILY
Qty: 180 TABLET | Refills: 1 | Status: SHIPPED | OUTPATIENT
Start: 2020-12-21 | End: 2021-06-22 | Stop reason: SDUPTHER

## 2020-12-21 RX ORDER — AMLODIPINE BESYLATE 2.5 MG/1
2.5 TABLET ORAL DAILY
Qty: 30 TABLET | Refills: 5 | Status: SHIPPED | OUTPATIENT
Start: 2020-12-21 | End: 2021-06-22 | Stop reason: SDUPTHER

## 2020-12-21 RX ORDER — AMLODIPINE BESYLATE 10 MG/1
TABLET ORAL
COMMUNITY
Start: 2020-11-23 | End: 2020-12-21

## 2021-01-04 ENCOUNTER — OFFICE VISIT (OUTPATIENT)
Dept: FAMILY MEDICINE CLINIC | Facility: CLINIC | Age: 76
End: 2021-01-04
Payer: MEDICARE

## 2021-01-04 VITALS
SYSTOLIC BLOOD PRESSURE: 162 MMHG | WEIGHT: 128.6 LBS | OXYGEN SATURATION: 96 % | BODY MASS INDEX: 21.95 KG/M2 | DIASTOLIC BLOOD PRESSURE: 88 MMHG | HEIGHT: 64 IN | RESPIRATION RATE: 17 BRPM | TEMPERATURE: 98 F | HEART RATE: 74 BPM

## 2021-01-04 DIAGNOSIS — F41.0 PANIC ATTACKS: ICD-10-CM

## 2021-01-04 DIAGNOSIS — I10 ESSENTIAL HYPERTENSION: ICD-10-CM

## 2021-01-04 DIAGNOSIS — K22.719 BARRETT'S ESOPHAGUS WITH DYSPLASIA: ICD-10-CM

## 2021-01-04 DIAGNOSIS — R00.2 PALPITATIONS: Primary | ICD-10-CM

## 2021-01-04 DIAGNOSIS — C50.911 BREAST CANCER, STAGE 1, RIGHT (HCC): ICD-10-CM

## 2021-01-04 DIAGNOSIS — F41.9 ANXIETY: ICD-10-CM

## 2021-01-04 DIAGNOSIS — F33.0 MILD EPISODE OF RECURRENT MAJOR DEPRESSIVE DISORDER (HCC): ICD-10-CM

## 2021-01-04 PROCEDURE — 93000 ELECTROCARDIOGRAM COMPLETE: CPT | Performed by: FAMILY MEDICINE

## 2021-01-04 PROCEDURE — 99215 OFFICE O/P EST HI 40 MIN: CPT | Performed by: FAMILY MEDICINE

## 2021-01-04 RX ORDER — CLONAZEPAM 0.5 MG/1
0.5 TABLET ORAL DAILY PRN
Qty: 30 TABLET | Refills: 2 | Status: SHIPPED | OUTPATIENT
Start: 2021-01-04 | End: 2021-03-22 | Stop reason: SDUPTHER

## 2021-01-04 RX ORDER — ESCITALOPRAM OXALATE 10 MG/1
10 TABLET ORAL DAILY
Qty: 30 TABLET | Refills: 5 | Status: SHIPPED | OUTPATIENT
Start: 2021-01-04 | End: 2021-01-20 | Stop reason: SINTOL

## 2021-01-04 RX ORDER — TIZANIDINE 2 MG/1
2 TABLET ORAL DAILY PRN
COMMUNITY
Start: 2020-12-23 | End: 2022-01-24 | Stop reason: ALTCHOICE

## 2021-01-04 NOTE — PROGRESS NOTES
Chief Complaint   Patient presents with    Anxiety     can't sleep, awake at 1 AM    Hypertension       Assessment/Plan:  Start working even if 3 hours a day  Restart Lexapro  Hold Ativan and start Xanax   25 mg daily as needed  Discussed anxiety, panic attacks  Reassurance given  Diagnoses and all orders for this visit:    Palpitations    Panic attacks  -     clonazePAM (KlonoPIN) 0 5 mg tablet; Take 1 tablet (0 5 mg total) by mouth daily as needed for seizures    Essential hypertension    Johnston's esophagus with dysplasia    Anxiety  -     escitalopram (LEXAPRO) 10 mg tablet; Take 1 tablet (10 mg total) by mouth daily  -     clonazePAM (KlonoPIN) 0 5 mg tablet; Take 1 tablet (0 5 mg total) by mouth daily as needed for seizures    Mild episode of recurrent major depressive disorder (HCC)  -     escitalopram (LEXAPRO) 10 mg tablet; Take 1 tablet (10 mg total) by mouth daily    Breast cancer, stage 1, right ([facility] [address] )    Other orders  -     tiZANidine (ZANAFLEX) 2 mg tablet; Take 2 mg by mouth daily as needed  -     Diclofenac Sodium (VOLTAREN) 1 %; Apply 1 application topically 4 (four) times a day          Subjective:      Patient ID: Antolin Bishop is a 77 y o  female  Anxiety, panic attacks, palpations  Staying at home  Worrying a lot  Concerned about esophagus, reflux  No recent reflux  SH: neg tob, neg [place]  Wakes during night to urinate and can't go back to sleep, lays their and worries  Isn't working because has to wear a face mask  After taking to patient  For 15 minutes: BP: 136/68        The following portions of the patient's history were reviewed and updated as appropriate: allergies, current medications, past family history, past medical history, past social history and problem list   I have spent 40 minutes with Patient  today in which greater than 50% of this time was spent in counseling/coordination of care regarding Risks and benefits of tx options, Intructions for management, Patient and family education, Importance of tx compliance, Risk factor reductions and Impressions  Review of Systems   Constitutional: Negative  HENT: Negative  Eyes: Negative  Respiratory: Negative  Cardiovascular: Positive for palpitations  Gastrointestinal: Negative  Genitourinary: Negative  Musculoskeletal: Negative  Skin: Negative  Neurological: Negative  Psychiatric/Behavioral: Positive for sleep disturbance  The patient is nervous/anxious            Objective:      /88 (BP Location: Left arm, Patient Position: Sitting, Cuff Size: Standard)   Pulse 74   Temp 98 °F (36 7 °C) (Tympanic)   Resp 17   Ht 5' 4" (1 626 m)   Wt 58 3 kg (128 lb 9 6 oz)   LMP  (LMP Unknown)   SpO2 96%   BMI 22 07 kg/m²       Current Outpatient Medications:     acetaminophen (TYLENOL) 650 mg CR tablet, Take 650 mg by mouth every 8 (eight) hours as needed, Disp: , Rfl:     amLODIPine (NORVASC) 2 5 mg tablet, Take 1 tablet (2 5 mg total) by mouth daily, Disp: 30 tablet, Rfl: 5    benazepril (LOTENSIN) 20 mg tablet, Take 1 tablet (20 mg total) by mouth 2 (two) times a day, Disp: 180 tablet, Rfl: 1    CALCIUM CITRATE PO, Take by mouth, Disp: , Rfl:     Calcium Glycerophosphate (PRELIEF PO), Take 2 tablets by mouth as needed, Disp: , Rfl:     cholecalciferol (VITAMIN D3) 1,000 units tablet, Take 1,000 Units by mouth daily  , Disp: , Rfl:     cyanocobalamin (VITAMIN B-12) 100 mcg tablet, Take 50 mcg by mouth daily, Disp: , Rfl:     Diclofenac Sodium (VOLTAREN) 1 %, Apply 1 application topically 4 (four) times a day, Disp: , Rfl:     LORazepam (ATIVAN) 0 5 mg tablet, Take 1 tablet (0 5 mg total) by mouth daily at bedtime as needed for anxiety, Disp: 30 tablet, Rfl: 2    omeprazole (PriLOSEC) 20 mg delayed release capsule, Take 20 mg by mouth daily , Disp: , Rfl:     simvastatin (ZOCOR) 20 mg tablet, Take 20 mg by mouth daily at bedtime Takes 1 tablet every other day , Disp: , Rfl:    tiZANidine (ZANAFLEX) 2 mg tablet, Take 2 mg by mouth daily as needed, Disp: , Rfl:     mesalamine (CANASA) 1,000 mg suppository, Insert 1 suppository (1,000 mg total) into the rectum daily at bedtime for 180 days, Disp: 30 suppository, Rfl: 5    tamoxifen (NOLVADEX) 20 mg tablet, Take 20 mg by mouth daily  , Disp: , Rfl:      Physical Exam  Constitutional:       Appearance: She is well-developed  HENT:      Head: Normocephalic and atraumatic  Right Ear: Tympanic membrane and external ear normal       Left Ear: Tympanic membrane and external ear normal       Nose: Nose normal       Mouth/Throat:      Mouth: Mucous membranes are moist       Pharynx: Oropharynx is clear  Eyes:      Extraocular Movements: Extraocular movements intact  Conjunctiva/sclera: Conjunctivae normal       Pupils: Pupils are equal, round, and reactive to light  Neck:      Musculoskeletal: Normal range of motion and neck supple  Cardiovascular:      Rate and Rhythm: Normal rate and regular rhythm  Heart sounds: Normal heart sounds  Pulmonary:      Effort: Pulmonary effort is normal       Breath sounds: Normal breath sounds  Abdominal:      General: Bowel sounds are normal       Palpations: Abdomen is soft  Skin:     General: Skin is warm and dry  Neurological:      General: No focal deficit present  Mental Status: She is alert and oriented to person, place, and time  Deep Tendon Reflexes: Reflexes are normal and symmetric  Psychiatric:         Mood and Affect: Mood normal          Behavior: Behavior normal          Thought Content:  Thought content normal          Judgment: Judgment normal

## 2021-01-20 ENCOUNTER — OFFICE VISIT (OUTPATIENT)
Dept: FAMILY MEDICINE CLINIC | Facility: CLINIC | Age: 76
End: 2021-01-20
Payer: MEDICARE

## 2021-01-20 VITALS
SYSTOLIC BLOOD PRESSURE: 156 MMHG | DIASTOLIC BLOOD PRESSURE: 80 MMHG | WEIGHT: 130.6 LBS | OXYGEN SATURATION: 95 % | HEIGHT: 64 IN | BODY MASS INDEX: 22.3 KG/M2 | RESPIRATION RATE: 16 BRPM | TEMPERATURE: 97.8 F | HEART RATE: 85 BPM

## 2021-01-20 DIAGNOSIS — F41.9 ANXIETY: Primary | ICD-10-CM
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
## 2021-01-20 DIAGNOSIS — G47.00 INSOMNIA, UNSPECIFIED TYPE: ICD-10-CM

## 2021-01-20 DIAGNOSIS — M25.562 ARTHRALGIA OF BOTH LOWER LEGS: ICD-10-CM

## 2021-01-20 DIAGNOSIS — R53.83 TIREDNESS: ICD-10-CM

## 2021-01-20 DIAGNOSIS — K22.719 BARRETT'S ESOPHAGUS WITH DYSPLASIA: ICD-10-CM

## 2021-01-20 DIAGNOSIS — C50.911 BREAST CANCER, STAGE 1, RIGHT (HCC): ICD-10-CM

## 2021-01-20 DIAGNOSIS — M25.561 ARTHRALGIA OF BOTH LOWER LEGS: ICD-10-CM

## 2021-01-20 DIAGNOSIS — I10 ESSENTIAL HYPERTENSION: ICD-10-CM

## 2021-01-20 PROCEDURE — 99215 OFFICE O/P EST HI 40 MIN: CPT | Performed by: FAMILY MEDICINE

## 2021-01-20 RX ORDER — DOXYCYCLINE HYCLATE 100 MG
100 TABLET ORAL 2 TIMES DAILY
Qty: 28 TABLET | Refills: 1 | Status: SHIPPED | OUTPATIENT
Start: 2021-01-20 | End: 2021-02-03

## 2021-01-20 NOTE — PROGRESS NOTES
Chief Complaint   Patient presents with    Follow-up     1 month, took Lexapro for 3-4 days stopped because of diarrhea, trouble sleeping, jittery feeling    Leg Pain     was told she has arthritis thinks it may be rheumatoid arthritis       Assessment/Plan:  Doxy with food  Discussed tic borne illness's  Diagnoses and all orders for this visit:    Anxiety    Insomnia, unspecified type    Arthralgia of both lower legs  -     C-reactive protein; Future  -     MELANIA Screen w/ Reflex to Titer/Pattern; Future  -     RF Screen w/ Reflex to Titer; Future  -     Lyme Antibody Profile with reflex to WB; Future  -     doxycycline hyclate (VIBRA-TABS) 100 mg tablet; Take 1 tablet (100 mg total) by mouth 2 (two) times a day for 14 days    Tiredness  -     doxycycline hyclate (VIBRA-TABS) 100 mg tablet; Take 1 tablet (100 mg total) by mouth 2 (two) times a day for 14 days    Tick bite, initial encounter  -     doxycycline hyclate (VIBRA-TABS) 100 mg tablet; Take 1 tablet (100 mg total) by mouth 2 (two) times a day for 14 days    Essential hypertension    Johnston's esophagus with dysplasia    Breast cancer, stage 1, right (HCC)          Subjective:      Patient ID: Nicole Tello is a 77 y o  female  Feeling much better  Stopped the Lexapro - didn't feel good with it  Klonopin is working, sleeping thru the night and feeling relaxed during the day  Legs feel painful in am when first get up  History of right hip pain, sees ortho  Going on for past month  Patient concerned about arthritis  Knees, ankles, leg muscles hurt  C/O tiredness  Has been bit by a tic few times this summer  [place]  Tiredness  Reflux controlled, follows for breast CA  Depression improved  Anxiety decreased during the day  SH: Neg tob or [place]        The following portions of the patient's history were reviewed and updated as appropriate: allergies, current medications, past medical history, past social history and problem list   I have spent 25 minutes with Patient  today in which greater than 50% of this time was spent in counseling/coordination of care regarding Risks and benefits of tx options, Intructions for management, Patient and family education, Importance of tx compliance, Risk factor reductions and Impressions  Review of Systems   Constitutional: Positive for fatigue  HENT: Negative  Eyes: Negative  Respiratory: Negative  Cardiovascular: Negative  Gastrointestinal: Negative  Genitourinary: Negative  Musculoskeletal: Positive for arthralgias and myalgias  Skin: Negative  Neurological: Negative  Psychiatric/Behavioral: Negative            Objective:      /80 (BP Location: Left arm, Patient Position: Sitting, Cuff Size: Standard)   Pulse 85   Temp 97 8 °F (36 6 °C) (Tympanic)   Resp 16   Ht 5' 4" (1 626 m)   Wt 59 2 kg (130 lb 9 6 oz)   LMP  (LMP Unknown)   SpO2 95%   BMI 22 42 kg/m²       Current Outpatient Medications:     acetaminophen (TYLENOL) 650 mg CR tablet, Take 650 mg by mouth every 8 (eight) hours as needed, Disp: , Rfl:     amLODIPine (NORVASC) 2 5 mg tablet, Take 1 tablet (2 5 mg total) by mouth daily, Disp: 30 tablet, Rfl: 5    benazepril (LOTENSIN) 20 mg tablet, Take 1 tablet (20 mg total) by mouth 2 (two) times a day, Disp: 180 tablet, Rfl: 1    CALCIUM CITRATE PO, Take by mouth, Disp: , Rfl:     Calcium Glycerophosphate (PRELIEF PO), Take 2 tablets by mouth as needed, Disp: , Rfl:     cholecalciferol (VITAMIN D3) 1,000 units tablet, Take 1,000 Units by mouth daily  , Disp: , Rfl:     clonazePAM (KlonoPIN) 0 5 mg tablet, Take 1 tablet (0 5 mg total) by mouth daily as needed for seizures, Disp: 30 tablet, Rfl: 2    cyanocobalamin (VITAMIN B-12) 100 mcg tablet, Take 50 mcg by mouth daily, Disp: , Rfl:     Diclofenac Sodium (VOLTAREN) 1 %, Apply 1 application topically 4 (four) times a day, Disp: , Rfl:     omeprazole (PriLOSEC) 20 mg delayed release capsule, Take 20 mg by mouth daily , Disp: , Rfl:     simvastatin (ZOCOR) 20 mg tablet, Take 20 mg by mouth daily at bedtime Takes 1 tablet every other day , Disp: , Rfl:     tiZANidine (ZANAFLEX) 2 mg tablet, Take 2 mg by mouth daily as needed, Disp: , Rfl:     doxycycline hyclate (VIBRA-TABS) 100 mg tablet, Take 1 tablet (100 mg total) by mouth 2 (two) times a day for 14 days, Disp: 28 tablet, Rfl: 1    mesalamine (CANASA) 1,000 mg suppository, Insert 1 suppository (1,000 mg total) into the rectum daily at bedtime for 180 days, Disp: 30 suppository, Rfl: 5    tamoxifen (NOLVADEX) 20 mg tablet, Take 20 mg by mouth daily  , Disp: , Rfl:      Physical Exam  Constitutional:       Appearance: She is well-developed  HENT:      Head: Normocephalic and atraumatic  Right Ear: External ear normal       Left Ear: External ear normal       Nose: Nose normal    Eyes:      Conjunctiva/sclera: Conjunctivae normal       Pupils: Pupils are equal, round, and reactive to light  Neck:      Musculoskeletal: Normal range of motion and neck supple  Cardiovascular:      Rate and Rhythm: Normal rate and regular rhythm  Heart sounds: Normal heart sounds  Pulmonary:      Effort: Pulmonary effort is normal       Breath sounds: Normal breath sounds  Abdominal:      General: Bowel sounds are normal       Palpations: Abdomen is soft  Skin:     General: Skin is warm and dry  Neurological:      Mental Status: She is alert and oriented to person, place, and time  Deep Tendon Reflexes: Reflexes are normal and symmetric  Psychiatric:         Behavior: Behavior normal          Thought Content:  Thought content normal          Judgment: Judgment normal

## 2021-01-21 ENCOUNTER — OFFICE VISIT (OUTPATIENT)
Dept: UROLOGY | Facility: CLINIC | Age: 76
End: 2021-01-21
Payer: MEDICARE

## 2021-01-21 VITALS
BODY MASS INDEX: 21.85 KG/M2 | WEIGHT: 128 LBS | HEIGHT: 64 IN | SYSTOLIC BLOOD PRESSURE: 140 MMHG | DIASTOLIC BLOOD PRESSURE: 90 MMHG

## 2021-01-21 DIAGNOSIS — M62.89 PELVIC FLOOR DYSFUNCTION IN FEMALE: Primary | ICD-10-CM

## 2021-01-21 PROCEDURE — 99213 OFFICE O/P EST LOW 20 MIN: CPT | Performed by: PHYSICIAN ASSISTANT

## 2021-01-21 NOTE — PROGRESS NOTES
UROLOGY PROGRESS NOTE   Patient Identifiers: Charbel Morgan (MRN [id number])  Date of Service: 1/21/2021    Subjective:    25-year-old female history of pelvic floor dysfunction  She had been on Elavil and Ativan p r n  however her  Oncologist asked her to stop the Elavil due to its interaction with tamoxifen  She has been using Ativan p r n  successfully at a very low dose  She has been diagnosed recently with ulcerative colitis and proctitis as well as Johnston's esophagus  She has not had any urinary tract infections and her bladder symptoms have all but subsided        Reason for visit:  Female pelvic floor dysfunction    Objective:     VITALS:    Vitals:    01/21/21 0950   BP: 140/90           LABS:  Lab Results   Component Value Date    HGB 13 8 08/01/2019    HCT 43 4 08/01/2019    WBC 9 26 08/01/2019     08/01/2019   ]    Lab Results   Component Value Date     07/01/2015    K 3 9 02/26/2019     (H) 02/26/2019    CO2 27 02/26/2019    BUN 14 02/26/2019    CREATININE 0 70 02/26/2019    CALCIUM 8 9 02/26/2019    GLUCOSE 97 07/01/2015   ]        INPATIENT MEDS:    Current Outpatient Medications:     acetaminophen (TYLENOL) 650 mg CR tablet, Take 650 mg by mouth every 8 (eight) hours as needed, Disp: , Rfl:     amLODIPine (NORVASC) 2 5 mg tablet, Take 1 tablet (2 5 mg total) by mouth daily, Disp: 30 tablet, Rfl: 5    benazepril (LOTENSIN) 20 mg tablet, Take 1 tablet (20 mg total) by mouth 2 (two) times a day, Disp: 180 tablet, Rfl: 1    CALCIUM CITRATE PO, Take by mouth, Disp: , Rfl:     Calcium Glycerophosphate (PRELIEF PO), Take 2 tablets by mouth as needed, Disp: , Rfl:     cholecalciferol (VITAMIN D3) 1,000 units tablet, Take 1,000 Units by mouth daily  , Disp: , Rfl:     clonazePAM (KlonoPIN) 0 5 mg tablet, Take 1 tablet (0 5 mg total) by mouth daily as needed for seizures, Disp: 30 tablet, Rfl: 2    cyanocobalamin (VITAMIN B-12) 100 mcg tablet, Take 50 mcg by mouth daily, Disp: , Rfl:     Diclofenac Sodium (VOLTAREN) 1 %, Apply 1 application topically 4 (four) times a day, Disp: , Rfl:     doxycycline hyclate (VIBRA-TABS) 100 mg tablet, Take 1 tablet (100 mg total) by mouth 2 (two) times a day for 14 days, Disp: 28 tablet, Rfl: 1    omeprazole (PriLOSEC) 20 mg delayed release capsule, Take 20 mg by mouth daily , Disp: , Rfl:     simvastatin (ZOCOR) 20 mg tablet, Take 20 mg by mouth daily at bedtime Takes 1 tablet every other day , Disp: , Rfl:     tiZANidine (ZANAFLEX) 2 mg tablet, Take 2 mg by mouth daily as needed, Disp: , Rfl:     mesalamine (CANASA) 1,000 mg suppository, Insert 1 suppository (1,000 mg total) into the rectum daily at bedtime for 180 days, Disp: 30 suppository, Rfl: 5    tamoxifen (NOLVADEX) 20 mg tablet, Take 20 mg by mouth daily  , Disp: , Rfl:       Physical Exam:   /90 (BP Location: Left arm, Patient Position: Sitting, Cuff Size: Adult)   Ht 5' 4" (1 626 m)   Wt 58 1 kg (128 lb)   LMP  (LMP Unknown)   BMI 21 97 kg/m²   GEN: no acute distress    RESP: breathing comfortably with no accessory muscle use    ABD: soft, non-tender, non-distended   INCISION:    EXT: no significant peripheral edema       RADIOLOGY:    none     Assessment:    1   Female pelvic floor dysfunction     Plan:   - she prefers to follow-up on an as-needed basis  - she may call in the future with any further questions or concerns  - she will discussed discontinuing the Ativan with Dr Yuliet Valdovinos  -

## 2021-01-29 ENCOUNTER — TELEPHONE (OUTPATIENT)
Dept: FAMILY MEDICINE CLINIC | Facility: CLINIC | Age: 76
End: 2021-01-29

## 2021-01-29 NOTE — TELEPHONE ENCOUNTER
----- Message from Daksha Bass, DO sent at 1/26/2021  2:26 PM EST -----  Positive Lyme disease - Tic infection  Continue antibiotics

## 2021-01-29 NOTE — TELEPHONE ENCOUNTER
Patient saw positive Lyme result on MyChart, feels a little better, feels tired sometimes and has leg pain after sitting down for awhile  Patient stated she did not need follow up since she is already taking antibiotic  Will keep her regular follow up appt for 3/22/21

## 2021-02-23 ENCOUNTER — IMMUNIZATIONS (OUTPATIENT)
Dept: FAMILY MEDICINE CLINIC | Facility: HOSPITAL | Age: 76
End: 2021-02-23

## 2021-02-23 DIAGNOSIS — Z23 ENCOUNTER FOR IMMUNIZATION: Primary | ICD-10-CM

## 2021-02-23 PROCEDURE — 91300 SARS-COV-2 / COVID-19 MRNA VACCINE (PFIZER-BIONTECH) 30 MCG: CPT

## 2021-02-23 PROCEDURE — 0001A SARS-COV-2 / COVID-19 MRNA VACCINE (PFIZER-BIONTECH) 30 MCG: CPT

## 2021-03-14 ENCOUNTER — IMMUNIZATIONS (OUTPATIENT)
Dept: FAMILY MEDICINE CLINIC | Facility: HOSPITAL | Age: 76
End: 2021-03-14

## 2021-03-14 DIAGNOSIS — Z23 ENCOUNTER FOR IMMUNIZATION: Primary | ICD-10-CM

## 2021-03-14 PROCEDURE — 91300 SARS-COV-2 / COVID-19 MRNA VACCINE (PFIZER-BIONTECH) 30 MCG: CPT

## 2021-03-14 PROCEDURE — 0002A SARS-COV-2 / COVID-19 MRNA VACCINE (PFIZER-BIONTECH) 30 MCG: CPT

## 2021-03-17 DIAGNOSIS — F41.0 PANIC ATTACKS: ICD-10-CM

## 2021-03-17 DIAGNOSIS — F41.9 ANXIETY: ICD-10-CM

## 2021-03-17 RX ORDER — CLONAZEPAM 0.5 MG/1
0.5 TABLET ORAL DAILY PRN
Qty: 30 TABLET | Refills: 1 | OUTPATIENT
Start: 2021-03-17

## 2021-03-22 ENCOUNTER — OFFICE VISIT (OUTPATIENT)
Dept: FAMILY MEDICINE CLINIC | Facility: CLINIC | Age: 76
End: 2021-03-22
Payer: MEDICARE

## 2021-03-22 VITALS
SYSTOLIC BLOOD PRESSURE: 142 MMHG | BODY MASS INDEX: 22.13 KG/M2 | OXYGEN SATURATION: 98 % | DIASTOLIC BLOOD PRESSURE: 86 MMHG | HEIGHT: 64 IN | TEMPERATURE: 97.9 F | HEART RATE: 81 BPM | WEIGHT: 129.6 LBS

## 2021-03-22 DIAGNOSIS — M25.559 HIP PAIN: ICD-10-CM

## 2021-03-22 DIAGNOSIS — Z00.00 MEDICARE ANNUAL WELLNESS VISIT, SUBSEQUENT: Primary | ICD-10-CM

## 2021-03-22 DIAGNOSIS — E78.00 HYPERCHOLESTEREMIA: ICD-10-CM

## 2021-03-22 DIAGNOSIS — F41.0 PANIC ATTACKS: ICD-10-CM

## 2021-03-22 DIAGNOSIS — F43.9 STRESS AT HOME: ICD-10-CM

## 2021-03-22 DIAGNOSIS — M19.90 ARTHRITIS: ICD-10-CM

## 2021-03-22 DIAGNOSIS — F41.9 ANXIETY: ICD-10-CM

## 2021-03-22 PROCEDURE — 1123F ACP DISCUSS/DSCN MKR DOCD: CPT | Performed by: FAMILY MEDICINE

## 2021-03-22 PROCEDURE — G0439 PPPS, SUBSEQ VISIT: HCPCS | Performed by: FAMILY MEDICINE

## 2021-03-22 PROCEDURE — 99214 OFFICE O/P EST MOD 30 MIN: CPT | Performed by: FAMILY MEDICINE

## 2021-03-22 RX ORDER — SIMVASTATIN 20 MG
20 TABLET ORAL
Qty: 90 TABLET | Refills: 1 | Status: SHIPPED | OUTPATIENT
Start: 2021-03-22 | End: 2021-10-19

## 2021-03-22 RX ORDER — CLONAZEPAM 0.5 MG/1
0.5 TABLET ORAL DAILY PRN
Qty: 30 TABLET | Refills: 3 | Status: SHIPPED | OUTPATIENT
Start: 2021-03-22 | End: 2021-10-19 | Stop reason: ALTCHOICE

## 2021-03-22 NOTE — PROGRESS NOTES
Assessment and Plan:     Problem List Items Addressed This Visit     None      Visit Diagnoses     Medicare annual wellness visit, subsequent    -  Primary    Panic attacks        Relevant Medications    clonazePAM (KlonoPIN) 0 5 mg tablet    Anxiety        Relevant Medications    clonazePAM (KlonoPIN) 0 5 mg tablet    Hip pain        Relevant Orders    Ambulatory referral to Orthopedic Surgery    MELANIA Screen w/ Reflex to Titer/Pattern    RF Screen w/ Reflex to Titer    Sedimentation rate, automated    Hypercholesteremia        Relevant Medications    simvastatin (ZOCOR) 20 mg tablet    Arthritis        Relevant Orders    MELANIA Screen w/ Reflex to Titer/Pattern    RF Screen w/ Reflex to Titer    Sedimentation rate, automated    Stress at home               Preventive health issues were discussed with patient, and age appropriate screening tests were ordered as noted in patient's After Visit Summary  Personalized health advice and appropriate referrals for health education or preventive services given if needed, as noted in patient's After Visit Summary  History of Present Illness:     Patient presents for Welcome to Medicare visit  Patient Care Team:  Bethanie Maynard DO as PCP - General  DO Garrett Graff MD Verlean Sanger, MD     Review of Systems:     Review of Systems   Constitutional: Negative  HENT: Negative  Eyes: Negative  Respiratory: Negative  Cardiovascular: Negative  Gastrointestinal: Negative  Genitourinary: Negative  Musculoskeletal: Positive for arthralgias  Skin: Negative  Neurological: Negative  Psychiatric/Behavioral: The patient is nervous/anxious         Problem List:     Patient Active Problem List   Diagnosis    Contusion of left elbow    Essential hypertension    Current mild episode of major depressive disorder without prior episode ([address] )    Tremor    Breast cancer, stage 1, right ([facility] [address]    Johnston's esophagus with dysplasia      Past Medical and Surgical History:     Past Medical History:   Diagnosis Date    Allergic     Anxiety     Arthritis     Johnston's esophagus 2020    Bulging discs     Cancer ([facility] [address] ) Breast    R breast    Chronic interstitial cystitis     Colon polyp     Diverticulosis     Elevated amylase     Last Assessed:  3/24/15    Fibromyalgia, primary     GERD (gastroesophageal reflux disease)     Hyperlipidemia     Hypertension     Irritable bowel syndrome     Migraine     Pelvic floor dysfunction     Pneumonia     Psychiatric disorder     Ribs, multiple fractures     2019    Serum lipase elevation     Last Assessed:  13    Vitamin D deficiency      Past Surgical History:   Procedure Laterality Date    BAND HEMORRHOIDECTOMY      internal    BREAST LUMPECTOMY Right 10/2016    CHOLECYSTECTOMY      COLONOSCOPY      , "very difficult exam" per H&P    TONSILLECTOMY      TOOTH EXTRACTION      TUBAL LIGATION        Family History:     Family History   Problem Relation Age of Onset    Hypertension Mother     Heart disease Father     Hypertension Father     Stroke Father         Syndrome      Social History:     E-Cigarette/Vaping    E-Cigarette Use Never User      E-Cigarette/Vaping Substances    Nicotine No     THC No     CBD No     Flavoring No     Other No     Unknown No      Social History     Socioeconomic History    Marital status: /Civil Union     Spouse name: None    Number of children: None    Years of education: None    Highest education level: None   Occupational History    Occupation: Retired   Social Needs    Financial resource strain: None    Food insecurity     Worry: None     Inability: None    Transportation needs     Medical: None     Non-medical: None   Tobacco Use    Smoking status: Former Smoker     Types: Cigarettes     Quit date:      Years since quittin 2    Smokeless tobacco: Never Used   Substance and Sexual Activity  Alcohol use: Never     Frequency: Never     Binge frequency: Never     Comment: social    Drug use: No     Comment: CBD oil with tea at night    Sexual activity: None   Lifestyle    Physical activity     Days per week: None     Minutes per session: None    Stress: None   Relationships    Social connections     Talks on phone: None     Gets together: None     Attends Judaism service: None     Active member of club or organization: None     Attends meetings of clubs or organizations: None     Relationship status: None    Intimate partner violence     Fear of current or ex partner: None     Emotionally abused: None     Physically abused: None     Forced sexual activity: None   Other Topics Concern    None   Social History Narrative    Uses safety equipment      Medications and Allergies:     Current Outpatient Medications   Medication Sig Dispense Refill    acetaminophen (TYLENOL) 650 mg CR tablet Take 650 mg by mouth every 8 (eight) hours as needed      amLODIPine (NORVASC) 2 5 mg tablet Take 1 tablet (2 5 mg total) by mouth daily 30 tablet 5    benazepril (LOTENSIN) 20 mg tablet Take 1 tablet (20 mg total) by mouth 2 (two) times a day 180 tablet 1    CALCIUM CITRATE PO Take by mouth      Calcium Glycerophosphate (PRELIEF PO) Take 2 tablets by mouth as needed      cholecalciferol (VITAMIN D3) 1,000 units tablet Take 1,000 Units by mouth daily        clonazePAM (KlonoPIN) 0 5 mg tablet Take 1 tablet (0 5 mg total) by mouth daily as needed for seizures 30 tablet 3    cyanocobalamin (VITAMIN B-12) 100 mcg tablet Take 50 mcg by mouth daily      mesalamine (CANASA) 1,000 mg suppository Insert 1 suppository (1,000 mg total) into the rectum daily at bedtime for 180 days 30 suppository 5    omeprazole (PriLOSEC) 20 mg delayed release capsule Take 20 mg by mouth daily       simvastatin (ZOCOR) 20 mg tablet Take 1 tablet (20 mg total) by mouth daily at bedtime Takes 1 tablet every other day   90 tablet 1  tamoxifen (NOLVADEX) 20 mg tablet Take 20 mg by mouth daily        tiZANidine (ZANAFLEX) 2 mg tablet Take 2 mg by mouth daily as needed      Diclofenac Sodium (VOLTAREN) 1 % Apply 1 application topically 4 (four) times a day       No current facility-administered medications for this visit  Allergies   Allergen Reactions    Bactrim [Sulfamethoxazole-Trimethoprim]     Lyrica [Pregabalin]     Pneumovax 23 [Pneumococcal Vac Polyvalent]     Pollen Extract     Prevnar 13 [Pneumococcal 13-Deandra Conj Vacc] Swelling    Trazodone Other (See Comments)     Dry mouth     Zithromax [Azithromycin]       Immunizations:     Immunization History   Administered Date(s) Administered    INFLUENZA 12/09/2014, 10/05/2015, 10/05/2015, 10/04/2016    Influenza Quadrivalent 3 years and older 10/01/2020    Influenza Split High Dose Preservative Free IM 10/04/2016    Influenza, high dose seasonal 0 7 mL 10/11/2019    Rabies, Intramuscular 03/17/2016, 03/20/2016    SARS-CoV-2 / COVID-19 mRNA IM ([employer]) 02/23/2021, 03/14/2021      Health Maintenance:         Topic Date Due    MAMMOGRAM  07/26/2020    Cervical Cancer Screening  01/21/2025    Colorectal Cancer Screening  08/26/2029    Hepatitis C Screening  Completed         Topic Date Due    DTaP,Tdap,and Td Vaccines (1 - Tdap) Never done    Pneumococcal Vaccine: 65+ Years (1 of 1 - PPSV23) Never done      Medicare Screening Tests and Risk Assessments:     Brodie Hinson is here for her Subsequent Wellness visit  Health Risk Assessment:   Patient rates overall health as very good  Patient feels that their physical health rating is slightly worse  Patient is satisfied with their life  Eyesight was rated as slightly worse  Hearing was rated as same  Patient feels that their emotional and mental health rating is slightly worse  Patients states they are often angry  Patient states they are often unusually tired/fatigued   Pain experienced in the last 7 days has been a lot  Patient's pain rating has been 7/10  Patient states that she has experienced no weight loss or gain in last 6 months  Depression Screening:   PHQ-2 Score: 2  PHQ-9 Score: 10      Fall Risk Screening: In the past year, patient has experienced: no history of falling in past year      Urinary Incontinence Screening:   Patient has not leaked urine accidently in the last six months  Home Safety:  Patient has trouble with stairs inside or outside of their home  Patient has working smoke alarms and has working carbon monoxide detector  Home safety hazards include: none  Nutrition:   Current diet is Regular  Medications:   Patient is currently taking over-the-counter supplements  OTC medications include: see medication list  Patient is able to manage medications  Activities of Daily Living (ADLs)/Instrumental Activities of Daily Living (IADLs):   Walk and transfer into and out of bed and chair?: Yes  Dress and groom yourself?: Yes    Bathe or shower yourself?: Yes    Feed yourself?  Yes  Do your laundry/housekeeping?: Yes  Manage your money, pay your bills and track your expenses?: Yes  Make your own meals?: Yes    Do your own shopping?: Yes    Previous Hospitalizations:   Any hospitalizations or ED visits within the last 12 months?: Yes    How many hospitalizations have you had in the last year?: 1-2    Advance Care Planning:   Living will: No    Durable POA for healthcare: No    Advanced directive: No      Cognitive Screening:   Provider or family/friend/caregiver concerned regarding cognition?: No    PREVENTIVE SCREENINGS      Cardiovascular Screening:    General: Screening Not Indicated and History Lipid Disorder      Diabetes Screening:     General: Screening Current      Colorectal Cancer Screening:     General: Screening Current      Breast Cancer Screening:     General: History Breast Cancer and Screening Current      Cervical Cancer Screening:    General: Screening Not Indicated Osteoporosis Screening:    General: Screening Current      Abdominal Aortic Aneurysm (AAA) Screening:        General: Screening Not Indicated      Lung Cancer Screening:     General: Screening Not Indicated      Hepatitis C Screening:    General: Screening Current    Screening, Brief Intervention, and Referral to Treatment (SBIRT)    Screening      AUDIT-C Screenin) How often did you have a drink containing alcohol in the past year? never  2) How many drinks did you have on a typical day when you were drinking in the past year? never  3) How often did you have 6 or more drinks on one occasion in the past year? never    AUDIT-C Score: 0  Interpretation: Score 0-2 (female): Negative screen for alcohol misuse    Single Item Drug Screening:  How often have you used an illegal drug (including marijuana) or a prescription medication for non-medical reasons in the past year? never    Single Item Drug Screen Score: 0  Interpretation: Negative screen for possible drug use disorder    No exam data present     Physical Exam:     /86 (BP Location: Left arm, Patient Position: Sitting, Cuff Size: Standard)   Pulse 81   Temp 97 9 °F (36 6 °C) (Tympanic)   Ht 5' 4" (1 626 m)   Wt 58 8 kg (129 lb 9 6 oz)   LMP  (LMP Unknown)   SpO2 98%   BMI 22 25 kg/m²     Physical Exam  Vitals signs and nursing note reviewed  Constitutional:       General: She is not in acute distress  Appearance: She is well-developed  HENT:      Head: Normocephalic and atraumatic  Mouth/Throat:      Mouth: Mucous membranes are moist       Pharynx: Oropharynx is clear  Eyes:      Conjunctiva/sclera: Conjunctivae normal    Neck:      Musculoskeletal: Neck supple  Cardiovascular:      Rate and Rhythm: Normal rate and regular rhythm  Pulses: Normal pulses  Heart sounds: Normal heart sounds  No murmur  Pulmonary:      Effort: Pulmonary effort is normal  No respiratory distress  Breath sounds: Normal breath sounds  Abdominal:      General: Abdomen is flat  Bowel sounds are normal       Palpations: Abdomen is soft  Tenderness: There is no abdominal tenderness  Skin:     General: Skin is warm and dry  Neurological:      General: No focal deficit present  Mental Status: She is alert and oriented to person, place, and time  Psychiatric:         Mood and Affect: Mood normal          Thought Content:  Thought content normal          Judgment: Judgment normal           Selene Kayser, DO

## 2021-03-22 NOTE — PROGRESS NOTES
Chief Complaint   Patient presents with    Follow-up     review BW  Medicare Wellness Visit     Assessment/Plan:  Discussed physical activity and getting out chen with the good weather  Depression Screening Follow-up Plan: Patient's depression screening was positive with a PHQ-2 score of 2  Their PHQ-9 score was 10  Clinically patient does not have depression  No treatment is required  Diagnoses and all orders for this visit:    Medicare annual wellness visit, subsequent    Panic attacks  -     clonazePAM (KlonoPIN) 0 5 mg tablet; Take 1 tablet (0 5 mg total) by mouth daily as needed for seizures    Anxiety  -     clonazePAM (KlonoPIN) 0 5 mg tablet; Take 1 tablet (0 5 mg total) by mouth daily as needed for seizures    Hip pain  -     Ambulatory referral to Orthopedic Surgery; Future  -     MELANIA Screen w/ Reflex to Titer/Pattern; Future  -     RF Screen w/ Reflex to Titer; Future  -     Sedimentation rate, automated; Future    Hypercholesteremia  -     simvastatin (ZOCOR) 20 mg tablet; Take 1 tablet (20 mg total) by mouth daily at bedtime Takes 1 tablet every other day  Arthritis  -     MELANIA Screen w/ Reflex to Titer/Pattern; Future  -     RF Screen w/ Reflex to Titer; Future  -     Sedimentation rate, automated; Future    Stress at home          Subjective:      Patient ID: Hortencia Guardado is a 77 y o  female  Review labs, refill Med's  Also C/O BL hips and knee pain's  Patient has an ortho Dr by [facility]  Gets bladder spasms with being stressed at home  Use Ativan  Had seen Urology prior for        The following portions of the patient's history were reviewed and updated as appropriate: allergies, current medications, past medical history, past social history, past surgical history and problem list   I have spent 25 minutes with Patient  today in which greater than 50% of this time was spent in counseling/coordination of care regarding Diagnostic results, Risks and benefits of tx options, Intructions for management, Patient and family education, Importance of tx compliance, Risk factor reductions and Impressions  Review of Systems   Constitutional: Negative  HENT: Negative  Eyes: Negative  Respiratory: Negative  Cardiovascular: Negative  Gastrointestinal: Negative  Genitourinary: Negative  Musculoskeletal: Positive for arthralgias  BL hips and knees hurt  Skin: Negative  Neurological: Negative  Psychiatric/Behavioral: The patient is nervous/anxious            Objective:      /86 (BP Location: Left arm, Patient Position: Sitting, Cuff Size: Standard)   Pulse 81   Temp 97 9 °F (36 6 °C) (Tympanic)   Ht 5' 4" (1 626 m)   Wt 58 8 kg (129 lb 9 6 oz)   LMP  (LMP Unknown)   SpO2 98%   BMI 22 25 kg/m²       Current Outpatient Medications:     acetaminophen (TYLENOL) 650 mg CR tablet, Take 650 mg by mouth every 8 (eight) hours as needed, Disp: , Rfl:     amLODIPine (NORVASC) 2 5 mg tablet, Take 1 tablet (2 5 mg total) by mouth daily, Disp: 30 tablet, Rfl: 5    benazepril (LOTENSIN) 20 mg tablet, Take 1 tablet (20 mg total) by mouth 2 (two) times a day, Disp: 180 tablet, Rfl: 1    CALCIUM CITRATE PO, Take by mouth, Disp: , Rfl:     Calcium Glycerophosphate (PRELIEF PO), Take 2 tablets by mouth as needed, Disp: , Rfl:     cholecalciferol (VITAMIN D3) 1,000 units tablet, Take 1,000 Units by mouth daily  , Disp: , Rfl:     clonazePAM (KlonoPIN) 0 5 mg tablet, Take 1 tablet (0 5 mg total) by mouth daily as needed for seizures, Disp: 30 tablet, Rfl: 2    cyanocobalamin (VITAMIN B-12) 100 mcg tablet, Take 50 mcg by mouth daily, Disp: , Rfl:     mesalamine (CANASA) 1,000 mg suppository, Insert 1 suppository (1,000 mg total) into the rectum daily at bedtime for 180 days, Disp: 30 suppository, Rfl: 5    omeprazole (PriLOSEC) 20 mg delayed release capsule, Take 20 mg by mouth daily , Disp: , Rfl:     simvastatin (ZOCOR) 20 mg tablet, Take 20 mg by mouth daily at bedtime Takes 1 tablet every other day , Disp: , Rfl:     tamoxifen (NOLVADEX) 20 mg tablet, Take 20 mg by mouth daily  , Disp: , Rfl:     tiZANidine (ZANAFLEX) 2 mg tablet, Take 2 mg by mouth daily as needed, Disp: , Rfl:     Diclofenac Sodium (VOLTAREN) 1 %, Apply 1 application topically 4 (four) times a day, Disp: , Rfl:     Allergies   Allergen Reactions    Bactrim [Sulfamethoxazole-Trimethoprim]     Lyrica [Pregabalin]     Pneumovax 23 [Pneumococcal Vac Polyvalent]     Pollen Extract     Prevnar 13 [Pneumococcal 13-Deandra Conj Vacc] Swelling    Trazodone Other (See Comments)     Dry mouth     Zithromax [Azithromycin]        Past Surgical History:   Procedure Laterality Date    BAND HEMORRHOIDECTOMY      internal    BREAST LUMPECTOMY Right 10/2016    CHOLECYSTECTOMY      COLONOSCOPY      2016, "very difficult exam" per H&P    TONSILLECTOMY      TOOTH EXTRACTION      TUBAL LIGATION            Physical Exam  Constitutional:       Appearance: She is well-developed  HENT:      Head: Normocephalic and atraumatic  Right Ear: Tympanic membrane, ear canal and external ear normal       Left Ear: Tympanic membrane, ear canal and external ear normal       Nose: Nose normal    Eyes:      Conjunctiva/sclera: Conjunctivae normal       Pupils: Pupils are equal, round, and reactive to light  Neck:      Musculoskeletal: Normal range of motion and neck supple  Cardiovascular:      Rate and Rhythm: Normal rate and regular rhythm  Heart sounds: Normal heart sounds  Pulmonary:      Effort: Pulmonary effort is normal       Breath sounds: Normal breath sounds  Abdominal:      General: Bowel sounds are normal       Palpations: Abdomen is soft  Skin:     General: Skin is warm and dry  Neurological:      Mental Status: She is alert and oriented to person, place, and time  Deep Tendon Reflexes: Reflexes are normal and symmetric     Psychiatric: Behavior: Behavior normal          Thought Content:  Thought content normal          Judgment: Judgment normal

## 2021-05-03 DIAGNOSIS — I10 ESSENTIAL HYPERTENSION: ICD-10-CM

## 2021-05-03 RX ORDER — BENAZEPRIL HYDROCHLORIDE 20 MG/1
20 TABLET ORAL 2 TIMES DAILY
Qty: 180 TABLET | Refills: 0 | OUTPATIENT
Start: 2021-05-03

## 2021-05-10 DIAGNOSIS — I10 ESSENTIAL HYPERTENSION: ICD-10-CM

## 2021-05-12 RX ORDER — AMLODIPINE BESYLATE 2.5 MG/1
2.5 TABLET ORAL DAILY
Qty: 30 TABLET | Refills: 4 | OUTPATIENT
Start: 2021-05-12

## 2021-06-22 ENCOUNTER — OFFICE VISIT (OUTPATIENT)
Dept: FAMILY MEDICINE CLINIC | Facility: CLINIC | Age: 76
End: 2021-06-22
Payer: MEDICARE

## 2021-06-22 VITALS
HEART RATE: 75 BPM | SYSTOLIC BLOOD PRESSURE: 142 MMHG | OXYGEN SATURATION: 96 % | TEMPERATURE: 98.8 F | DIASTOLIC BLOOD PRESSURE: 80 MMHG | WEIGHT: 126.8 LBS | RESPIRATION RATE: 20 BRPM | HEIGHT: 64 IN | BODY MASS INDEX: 21.65 KG/M2

## 2021-06-22 DIAGNOSIS — F43.9 STRESS AT HOME: Primary | ICD-10-CM

## 2021-06-22 DIAGNOSIS — F41.0 PANIC ATTACK: ICD-10-CM

## 2021-06-22 DIAGNOSIS — C50.911 BREAST CANCER, STAGE 1, RIGHT (HCC): ICD-10-CM

## 2021-06-22 DIAGNOSIS — I10 ESSENTIAL HYPERTENSION: ICD-10-CM

## 2021-06-22 PROCEDURE — 99214 OFFICE O/P EST MOD 30 MIN: CPT | Performed by: FAMILY MEDICINE

## 2021-06-22 RX ORDER — LORAZEPAM 0.5 MG/1
0.5 TABLET ORAL
Qty: 30 TABLET | Refills: 1 | Status: SHIPPED | OUTPATIENT
Start: 2021-06-22 | End: 2021-10-19 | Stop reason: SDUPTHER

## 2021-06-22 RX ORDER — BENAZEPRIL HYDROCHLORIDE 20 MG/1
20 TABLET ORAL 2 TIMES DAILY
Qty: 180 TABLET | Refills: 1 | Status: SHIPPED | OUTPATIENT
Start: 2021-06-22 | End: 2021-11-30 | Stop reason: SDUPTHER

## 2021-06-22 RX ORDER — AMLODIPINE BESYLATE 2.5 MG/1
2.5 TABLET ORAL DAILY
Qty: 90 TABLET | Refills: 1 | Status: SHIPPED | OUTPATIENT
Start: 2021-06-22 | End: 2021-11-30 | Stop reason: SDUPTHER

## 2021-06-22 NOTE — PROGRESS NOTES
Chief Complaint   Patient presents with    Follow-up     3 month f/u- would like Lorazepam refilled       Assessment/Plan:  Discussed needed outside activity away from home  Diagnoses and all orders for this visit:    Stress at home    Essential hypertension  -     amLODIPine (NORVASC) 2 5 mg tablet; Take 1 tablet (2 5 mg total) by mouth daily  -     benazepril (LOTENSIN) 20 mg tablet; Take 1 tablet (20 mg total) by mouth 2 (two) times a day    Panic attack  -     LORazepam (ATIVAN) 0 5 mg tablet; Take 1 tablet (0 5 mg total) by mouth daily at bedtime    Breast cancer, stage 1, right (HCC)          Subjective:      Patient ID: Rodolfo Escobar is a 77 y o  female  Stressed, has husbands aunt living with her who fell at her home and fractured clavicle  Uses Klonopin sparingly  Ativan use HS is on occasion  Seeing ortho for right hip - getting injections  The following portions of the patient's history were reviewed and updated as appropriate: allergies, current medications, past medical history, past social history, past surgical history and problem list   I have spent 25 minutes with Patient  today in which greater than 50% of this time was spent in counseling/coordination of care regarding Risks and benefits of tx options, Intructions for management, Patient and family education, Importance of tx compliance, Risk factor reductions and Impressions  Review of Systems   Constitutional: Negative  HENT: Negative  Eyes: Negative  Respiratory: Negative  Cardiovascular: Negative  Gastrointestinal: Negative  Genitourinary: Negative  Musculoskeletal: Negative  Skin: Negative  Neurological: Negative  Psychiatric/Behavioral: The patient is nervous/anxious            Objective:      /80 (BP Location: Left arm)   Pulse 75   Temp 98 8 °F (37 1 °C) (Temporal)   Resp 20   Ht 5' 4" (1 626 m)   Wt 57 5 kg (126 lb 12 8 oz)   LMP  (LMP Unknown)   SpO2 96%   BMI 21 77 kg/m²       Current Outpatient Medications:     acetaminophen (TYLENOL) 650 mg CR tablet, Take 650 mg by mouth every 8 (eight) hours as needed, Disp: , Rfl:     amLODIPine (NORVASC) 2 5 mg tablet, Take 1 tablet (2 5 mg total) by mouth daily, Disp: 30 tablet, Rfl: 5    benazepril (LOTENSIN) 20 mg tablet, Take 1 tablet (20 mg total) by mouth 2 (two) times a day, Disp: 180 tablet, Rfl: 1    CALCIUM CITRATE PO, Take by mouth, Disp: , Rfl:     Calcium Glycerophosphate (PRELIEF PO), Take 2 tablets by mouth as needed, Disp: , Rfl:     cholecalciferol (VITAMIN D3) 1,000 units tablet, Take 1,000 Units by mouth daily  , Disp: , Rfl:     clonazePAM (KlonoPIN) 0 5 mg tablet, Take 1 tablet (0 5 mg total) by mouth daily as needed for seizures, Disp: 30 tablet, Rfl: 3    cyanocobalamin (VITAMIN B-12) 100 mcg tablet, Take 50 mcg by mouth daily, Disp: , Rfl:     Diclofenac Sodium (VOLTAREN) 1 %, Apply 1 application topically 4 (four) times a day, Disp: , Rfl:     omeprazole (PriLOSEC) 20 mg delayed release capsule, Take 20 mg by mouth daily , Disp: , Rfl:     simvastatin (ZOCOR) 20 mg tablet, Take 1 tablet (20 mg total) by mouth daily at bedtime Takes 1 tablet every other day , Disp: 90 tablet, Rfl: 1    mesalamine (CANASA) 1,000 mg suppository, Insert 1 suppository (1,000 mg total) into the rectum daily at bedtime for 180 days, Disp: 30 suppository, Rfl: 5    tamoxifen (NOLVADEX) 20 mg tablet, Take 20 mg by mouth daily  , Disp: , Rfl:     tiZANidine (ZANAFLEX) 2 mg tablet, Take 2 mg by mouth daily as needed, Disp: , Rfl:      Allergies   Allergen Reactions    Bactrim [Sulfamethoxazole-Trimethoprim]     Lyrica [Pregabalin]     Pneumovax 23 [Pneumococcal Vac Polyvalent]     Pollen Extract     Prevnar 13 [Pneumococcal 13-Deandra Conj Vacc] Swelling    Trazodone Other (See Comments)     Dry mouth     Zithromax [Azithromycin]         Physical Exam  Constitutional:       Appearance: She is well-developed     HENT: Head: Normocephalic and atraumatic  Right Ear: External ear normal       Left Ear: External ear normal       Nose: Nose normal    Eyes:      Conjunctiva/sclera: Conjunctivae normal       Pupils: Pupils are equal, round, and reactive to light  Cardiovascular:      Rate and Rhythm: Normal rate and regular rhythm  Heart sounds: Normal heart sounds  Pulmonary:      Effort: Pulmonary effort is normal       Breath sounds: Normal breath sounds  Abdominal:      General: Bowel sounds are normal       Palpations: Abdomen is soft  Musculoskeletal:      Cervical back: Normal range of motion and neck supple  Skin:     General: Skin is warm and dry  Neurological:      Mental Status: She is alert and oriented to person, place, and time  Deep Tendon Reflexes: Reflexes are normal and symmetric  Psychiatric:         Behavior: Behavior normal          Thought Content:  Thought content normal          Judgment: Judgment normal

## 2021-07-12 ENCOUNTER — CLINICAL SUPPORT (OUTPATIENT)
Dept: FAMILY MEDICINE CLINIC | Facility: CLINIC | Age: 76
End: 2021-07-12
Payer: MEDICARE

## 2021-07-12 DIAGNOSIS — Z23 NEED FOR TDAP VACCINATION: Primary | ICD-10-CM

## 2021-07-12 PROCEDURE — 90715 TDAP VACCINE 7 YRS/> IM: CPT

## 2021-07-12 PROCEDURE — 90471 IMMUNIZATION ADMIN: CPT

## 2021-07-12 NOTE — PROGRESS NOTES
Pt here today for Tdap vaccine  Per Dr Niurka sandoval received vaccine on left deltoid, pt tolerated well with no complaints and will return if needed

## 2021-10-19 ENCOUNTER — OFFICE VISIT (OUTPATIENT)
Dept: FAMILY MEDICINE CLINIC | Facility: CLINIC | Age: 76
End: 2021-10-19
Payer: MEDICARE

## 2021-10-19 VITALS
SYSTOLIC BLOOD PRESSURE: 136 MMHG | BODY MASS INDEX: 21.51 KG/M2 | HEART RATE: 64 BPM | OXYGEN SATURATION: 98 % | DIASTOLIC BLOOD PRESSURE: 78 MMHG | WEIGHT: 126 LBS | HEIGHT: 64 IN | TEMPERATURE: 98.9 F

## 2021-10-19 DIAGNOSIS — F32.0 CURRENT MILD EPISODE OF MAJOR DEPRESSIVE DISORDER WITHOUT PRIOR EPISODE (HCC): ICD-10-CM

## 2021-10-19 DIAGNOSIS — I10 ESSENTIAL HYPERTENSION: ICD-10-CM

## 2021-10-19 DIAGNOSIS — F43.9 STRESS AT HOME: Primary | ICD-10-CM

## 2021-10-19 DIAGNOSIS — F41.0 PANIC ATTACK: ICD-10-CM

## 2021-10-19 DIAGNOSIS — F41.0 PANIC ATTACKS: ICD-10-CM

## 2021-10-19 DIAGNOSIS — F41.9 ANXIETY: ICD-10-CM

## 2021-10-19 PROCEDURE — 99215 OFFICE O/P EST HI 40 MIN: CPT | Performed by: FAMILY MEDICINE

## 2021-10-19 RX ORDER — LORAZEPAM 0.5 MG/1
0.5 TABLET ORAL
Qty: 30 TABLET | Refills: 2 | Status: SHIPPED | OUTPATIENT
Start: 2021-10-19 | End: 2021-11-30 | Stop reason: SDUPTHER

## 2021-10-19 RX ORDER — CLONAZEPAM 0.5 MG/1
0.5 TABLET ORAL DAILY PRN
Qty: 30 TABLET | Refills: 3 | Status: CANCELLED | OUTPATIENT
Start: 2021-10-19

## 2021-11-30 ENCOUNTER — OFFICE VISIT (OUTPATIENT)
Dept: FAMILY MEDICINE CLINIC | Facility: CLINIC | Age: 76
End: 2021-11-30
Payer: MEDICARE

## 2021-11-30 VITALS
DIASTOLIC BLOOD PRESSURE: 82 MMHG | BODY MASS INDEX: 21.95 KG/M2 | WEIGHT: 128.6 LBS | OXYGEN SATURATION: 97 % | HEART RATE: 76 BPM | SYSTOLIC BLOOD PRESSURE: 142 MMHG | HEIGHT: 64 IN | TEMPERATURE: 96.5 F

## 2021-11-30 DIAGNOSIS — F41.0 PANIC ATTACK: ICD-10-CM

## 2021-11-30 DIAGNOSIS — G47.00 INSOMNIA, UNSPECIFIED TYPE: Primary | ICD-10-CM

## 2021-11-30 DIAGNOSIS — I10 ESSENTIAL HYPERTENSION: ICD-10-CM

## 2021-11-30 PROCEDURE — 99214 OFFICE O/P EST MOD 30 MIN: CPT | Performed by: FAMILY MEDICINE

## 2021-11-30 RX ORDER — AMLODIPINE BESYLATE 2.5 MG/1
2.5 TABLET ORAL DAILY
Qty: 90 TABLET | Refills: 1 | Status: SHIPPED | OUTPATIENT
Start: 2021-11-30 | End: 2022-06-08 | Stop reason: SDUPTHER

## 2021-11-30 RX ORDER — LORAZEPAM 0.5 MG/1
0.5 TABLET ORAL
Qty: 30 TABLET | Refills: 3 | Status: SHIPPED | OUTPATIENT
Start: 2021-11-30 | End: 2022-04-12 | Stop reason: SDUPTHER

## 2021-11-30 RX ORDER — BENAZEPRIL HYDROCHLORIDE 20 MG/1
20 TABLET ORAL 2 TIMES DAILY
Qty: 180 TABLET | Refills: 1 | Status: SHIPPED | OUTPATIENT
Start: 2021-11-30 | End: 2022-06-08 | Stop reason: SDUPTHER

## 2021-12-13 ENCOUNTER — TELEMEDICINE (OUTPATIENT)
Dept: FAMILY MEDICINE CLINIC | Facility: CLINIC | Age: 76
End: 2021-12-13
Payer: MEDICARE

## 2021-12-13 DIAGNOSIS — R11.2 NON-INTRACTABLE VOMITING WITH NAUSEA, UNSPECIFIED VOMITING TYPE: ICD-10-CM

## 2021-12-13 DIAGNOSIS — K52.9 GE (GASTROENTERITIS): Primary | ICD-10-CM

## 2021-12-13 PROCEDURE — 99442 PR PHYS/QHP TELEPHONE EVALUATION 11-20 MIN: CPT | Performed by: FAMILY MEDICINE

## 2021-12-13 RX ORDER — ONDANSETRON 4 MG/1
4 TABLET, FILM COATED ORAL EVERY 12 HOURS PRN
Qty: 20 TABLET | Refills: 0 | Status: SHIPPED | OUTPATIENT
Start: 2021-12-13 | End: 2022-01-24 | Stop reason: ALTCHOICE

## 2021-12-26 ENCOUNTER — OFFICE VISIT (OUTPATIENT)
Dept: URGENT CARE | Age: 76
End: 2021-12-26
Payer: MEDICARE

## 2021-12-26 VITALS
OXYGEN SATURATION: 98 % | HEART RATE: 83 BPM | TEMPERATURE: 97.1 F | DIASTOLIC BLOOD PRESSURE: 77 MMHG | SYSTOLIC BLOOD PRESSURE: 156 MMHG | RESPIRATION RATE: 18 BRPM

## 2021-12-26 DIAGNOSIS — S50.812A CAT SCRATCH OF FOREARM, LEFT, INITIAL ENCOUNTER: Primary | ICD-10-CM
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
## 2021-12-26 PROCEDURE — 99213 OFFICE O/P EST LOW 20 MIN: CPT | Performed by: NURSE PRACTITIONER

## 2021-12-26 PROCEDURE — G0463 HOSPITAL OUTPT CLINIC VISIT: HCPCS | Performed by: NURSE PRACTITIONER

## 2021-12-26 RX ORDER — AMOXICILLIN AND CLAVULANATE POTASSIUM 500; 125 MG/1; MG/1
1 TABLET, FILM COATED ORAL 2 TIMES DAILY
Qty: 20 TABLET | Refills: 0 | Status: SHIPPED | OUTPATIENT
Start: 2021-12-26 | End: 2022-01-05

## 2022-01-12 ENCOUNTER — OFFICE VISIT (OUTPATIENT)
Dept: FAMILY MEDICINE CLINIC | Facility: CLINIC | Age: 77
End: 2022-01-12
Payer: COMMERCIAL

## 2022-01-12 VITALS
DIASTOLIC BLOOD PRESSURE: 78 MMHG | HEART RATE: 80 BPM | SYSTOLIC BLOOD PRESSURE: 140 MMHG | BODY MASS INDEX: 20.14 KG/M2 | WEIGHT: 118 LBS | TEMPERATURE: 98.6 F | OXYGEN SATURATION: 98 % | HEIGHT: 64 IN

## 2022-01-12 DIAGNOSIS — F32.0 CURRENT MILD EPISODE OF MAJOR DEPRESSIVE DISORDER WITHOUT PRIOR EPISODE (HCC): ICD-10-CM

## 2022-01-12 DIAGNOSIS — I10 ESSENTIAL HYPERTENSION: ICD-10-CM

## 2022-01-12 DIAGNOSIS — K22.719 BARRETT'S ESOPHAGUS WITH DYSPLASIA: ICD-10-CM
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
## 2022-01-12 DIAGNOSIS — L29.9 ITCHY SKIN: ICD-10-CM

## 2022-01-12 PROCEDURE — 99214 OFFICE O/P EST MOD 30 MIN: CPT | Performed by: FAMILY MEDICINE

## 2022-01-12 RX ORDER — CEFUROXIME AXETIL 500 MG/1
500 TABLET ORAL EVERY 12 HOURS SCHEDULED
Qty: 14 TABLET | Refills: 0 | Status: SHIPPED | OUTPATIENT
Start: 2022-01-12 | End: 2022-01-19

## 2022-01-12 NOTE — PROGRESS NOTES
Chief Complaint   Patient presents with    Arm Pain     pain, itchy,for cat on her left arm x 1 month     Assessment/Plan:  Explained to [facility] that some patients get GI side effects from Augmentin  Gently rinse and pat dry, DC neosporin  Moisture creams can be applied  Diagnoses and all orders for this visit:    Cat scratch  -     cefuroxime (CEFTIN) 500 mg tablet; Take 1 tablet (500 mg total) by mouth every 12 (twelve) hours for 7 days    Current mild episode of major depressive disorder without prior episode (HCC)          Subjective:      Patient ID: Nissa Taylor is a 77 y o  female  Cat scratched - 2 areas, treated with Augmentin, took for about 4 days- incomplete due to diarrhea  Areas continue to burn, itch and are tender  Cat took out clumps of skin  Diarrhea resolved when stopped Augmentin  Two areas 14 and 6 mm diameter, slightly elevated, mild redness with slight increase in temperature  Has been topical neosporin  Reflux controlled, continues with BP med's  The following portions of the patient's history were reviewed and updated as appropriate: allergies, current medications, past medical history, past social history, past surgical history and problem list   I have spent 25 minutes with Patient  today in which greater than 50% of this time was spent in counseling/coordination of care regarding Diagnostic results, Risks and benefits of tx options, Intructions for management, Patient and family education, Importance of tx compliance, Risk factor reductions and Impressions  Review of Systems   Constitutional: Negative  HENT: Negative  Eyes: Negative  Respiratory: Negative  Cardiovascular: Negative  Gastrointestinal: Negative  Negative for diarrhea  Genitourinary: Negative  Musculoskeletal: Negative  Skin: Positive for wound  Neurological: Negative  Psychiatric/Behavioral: Negative            Objective:      /78 (BP Location: Right arm, Patient Position: Standing, Cuff Size: Standard)   Pulse 80   Temp 98 6 °F (37 °C) (Temporal)   Ht 5' 4" (1 626 m)   Wt 53 5 kg (118 lb)   LMP  (LMP Unknown)   SpO2 98%   BMI 20 25 kg/m²     Current Outpatient Medications:     acetaminophen (TYLENOL) 650 mg CR tablet, Take 650 mg by mouth every 8 (eight) hours as needed, Disp: , Rfl:     amLODIPine (NORVASC) 2 5 mg tablet, Take 1 tablet (2 5 mg total) by mouth daily, Disp: 90 tablet, Rfl: 1    benazepril (LOTENSIN) 20 mg tablet, Take 1 tablet (20 mg total) by mouth 2 (two) times a day, Disp: 180 tablet, Rfl: 1    Calcium Glycerophosphate (PRELIEF PO), Take 2 tablets by mouth as needed, Disp: , Rfl:     cyanocobalamin (VITAMIN B-12) 100 mcg tablet, Take 50 mcg by mouth daily, Disp: , Rfl:     LORazepam (ATIVAN) 0 5 mg tablet, Take 1 tablet (0 5 mg total) by mouth daily at bedtime, Disp: 30 tablet, Rfl: 3    mesalamine (CANASA) 1,000 mg suppository, Insert 1 suppository (1,000 mg total) into the rectum daily at bedtime for 180 days, Disp: 30 suppository, Rfl: 5    omeprazole (PriLOSEC) 20 mg delayed release capsule, Take 20 mg by mouth daily , Disp: , Rfl:     CALCIUM CITRATE PO, Take by mouth (Patient not taking: Reported on 10/19/2021), Disp: , Rfl:     cholecalciferol (VITAMIN D3) 1,000 units tablet, Take 1,000 Units by mouth daily   (Patient not taking: Reported on 1/12/2022 ), Disp: , Rfl:     Diclofenac Sodium (VOLTAREN) 1 %, Apply 1 application topically 4 (four) times a day (Patient not taking: Reported on 1/12/2022 ), Disp: , Rfl:     ondansetron (ZOFRAN) 4 mg tablet, Take 1 tablet (4 mg total) by mouth every 12 (twelve) hours as needed for nausea or vomiting (Patient not taking: Reported on 1/12/2022 ), Disp: 20 tablet, Rfl: 0    sertraline (Zoloft) 50 mg tablet, Take 1 tablet (50 mg total) by mouth daily Start with 1/2 tab daily for 4 days then increase to one tab daily   (Patient not taking: Reported on 11/30/2021 ), Disp: 30 tablet, Rfl: 5    tiZANidine (ZANAFLEX) 2 mg tablet, Take 2 mg by mouth daily as needed (Patient not taking: Reported on 10/19/2021), Disp: , Rfl:   Allergies   Allergen Reactions    Bactrim [Sulfamethoxazole-Trimethoprim]     Lyrica [Pregabalin]     Pneumovax 23 [Pneumococcal Vac Polyvalent]     Pollen Extract     Prevnar 13 [Pneumococcal 13-Deandra Conj Vacc] Swelling    Sertraline Chest Pain    Trazodone Other (See Comments)     Dry mouth     Zithromax [Azithromycin]      Past Surgical History:   Procedure Laterality Date    BAND HEMORRHOIDECTOMY      internal    BREAST LUMPECTOMY Right 10/2016    CHOLECYSTECTOMY      COLONOSCOPY      2016, "very difficult exam" per H&P    TONSILLECTOMY      TOOTH EXTRACTION      TOTAL ABDOMINAL HYSTERECTOMY W/ BILATERAL SALPINGOOPHORECTOMY  08/04/2021    TUBAL LIGATION              Physical Exam  Constitutional:       Appearance: Normal appearance  Skin:     General: Skin is warm and dry  Comments: Two wounds non draining, 14 and 6 mm diameter, slight elevated, warm with mild redness, no open wound's   skin in area is dry  Neurological:      General: No focal deficit present  Mental Status: She is alert and oriented to person, place, and time  Psychiatric:         Mood and Affect: Mood normal          Behavior: Behavior normal          Thought Content:  Thought content normal          Judgment: Judgment normal

## 2022-01-24 ENCOUNTER — OFFICE VISIT (OUTPATIENT)
Dept: FAMILY MEDICINE CLINIC | Facility: CLINIC | Age: 77
End: 2022-01-24
Payer: COMMERCIAL

## 2022-01-24 ENCOUNTER — TELEPHONE (OUTPATIENT)
Dept: FAMILY MEDICINE CLINIC | Facility: CLINIC | Age: 77
End: 2022-01-24

## 2022-01-24 VITALS
HEIGHT: 64 IN | BODY MASS INDEX: 20.59 KG/M2 | OXYGEN SATURATION: 99 % | DIASTOLIC BLOOD PRESSURE: 84 MMHG | TEMPERATURE: 98.7 F | SYSTOLIC BLOOD PRESSURE: 140 MMHG | HEART RATE: 83 BPM | WEIGHT: 120.6 LBS
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
## 2022-01-24 DIAGNOSIS — I10 ESSENTIAL HYPERTENSION: ICD-10-CM

## 2022-01-24 DIAGNOSIS — F41.8 ANXIETY ABOUT HEALTH: ICD-10-CM

## 2022-01-24 PROCEDURE — 99213 OFFICE O/P EST LOW 20 MIN: CPT | Performed by: FAMILY MEDICINE

## 2022-01-24 NOTE — PROGRESS NOTES
Chief Complaint   Patient presents with    Follow-up     recheck     Assessment/Plan:  Re assurance no infection present but the area is healing  Diagnoses and all orders for this visit:    Cat scratch    Anxiety about health    Essential hypertension          Subjective:      Patient ID: Arcadio Tyson is a 77 y o  female  Has a lump where scratched, skin was scooped out  Patient worried about lesion -cat scratch  Finished antibiotics  Reflux controlled  Has been anxious - thinking about family issues, problem sleeping as a result  The following portions of the patient's history were reviewed and updated as appropriate: allergies, current medications, past medical history, past social history, past surgical history and problem list     PHQ-2/9 Depression Screening    Little interest or pleasure in doing things: 0 - not at all  Feeling down, depressed, or hopeless: 2 - more than half the days  Trouble falling or staying asleep, or sleeping too much: 3 - nearly every day  Feeling tired or having little energy: 1 - several days  Poor appetite or overeatin - several days  Feeling bad about yourself - or that you are a failure or have let yourself or your family down: 0 - not at all  Trouble concentrating on things, such as reading the newspaper or watching television: 0 - not at all  Moving or speaking so slowly that other people could have noticed  Or the opposite - being so fidgety or restless that you have been moving around a lot more than usual: 0 - not at all  Thoughts that you would be better off dead, or of hurting yourself in some way: 0 - not at all  PHQ-9 Score: 7   PHQ-9 Interpretation: Mild depression        BMI Counseling: Body mass index is 20 7 kg/m²  The BMI in normal range  Review of Systems   Constitutional: Negative  HENT: Negative  Eyes: Negative  Respiratory: Negative  Cardiovascular: Negative  Gastrointestinal: Negative  Genitourinary: Negative  Musculoskeletal: Negative  Skin: Negative  Neurological: Negative  Psychiatric/Behavioral: The patient is nervous/anxious            Objective:      /84 (BP Location: Right arm, Patient Position: Sitting, Cuff Size: Standard)   Pulse 83   Temp 98 7 °F (37 1 °C) (Temporal)   Ht 5' 4" (1 626 m)   Wt 54 7 kg (120 lb 9 6 oz)   LMP  (LMP Unknown)   SpO2 99%   BMI 20 70 kg/m²       Current Outpatient Medications:     acetaminophen (TYLENOL) 650 mg CR tablet, Take 650 mg by mouth every 8 (eight) hours as needed, Disp: , Rfl:     amLODIPine (NORVASC) 2 5 mg tablet, Take 1 tablet (2 5 mg total) by mouth daily, Disp: 90 tablet, Rfl: 1    benazepril (LOTENSIN) 20 mg tablet, Take 1 tablet (20 mg total) by mouth 2 (two) times a day, Disp: 180 tablet, Rfl: 1    hydrocortisone 2 5 % cream, Apply topically 3 (three) times a day as needed (as needed), Disp: 15 g, Rfl: 0    LORazepam (ATIVAN) 0 5 mg tablet, Take 1 tablet (0 5 mg total) by mouth daily at bedtime, Disp: 30 tablet, Rfl: 3    mesalamine (CANASA) 1,000 mg suppository, Insert 1 suppository (1,000 mg total) into the rectum daily at bedtime for 180 days, Disp: 30 suppository, Rfl: 5    omeprazole (PriLOSEC) 20 mg delayed release capsule, Take 20 mg by mouth daily , Disp: , Rfl:     CALCIUM CITRATE PO, Take by mouth (Patient not taking: Reported on 10/19/2021), Disp: , Rfl:     Calcium Glycerophosphate (PRELIEF PO), Take 2 tablets by mouth as needed, Disp: , Rfl:     cholecalciferol (VITAMIN D3) 1,000 units tablet, Take 1,000 Units by mouth daily   (Patient not taking: Reported on 1/12/2022 ), Disp: , Rfl:     cyanocobalamin (VITAMIN B-12) 100 mcg tablet, Take 50 mcg by mouth daily, Disp: , Rfl:     Diclofenac Sodium (VOLTAREN) 1 %, Apply 1 application topically 4 (four) times a day (Patient not taking: Reported on 1/12/2022 ), Disp: , Rfl:     ondansetron (ZOFRAN) 4 mg tablet, Take 1 tablet (4 mg total) by mouth every 12 (twelve) hours as needed for nausea or vomiting (Patient not taking: Reported on 1/12/2022 ), Disp: 20 tablet, Rfl: 0    sertraline (Zoloft) 50 mg tablet, Take 1 tablet (50 mg total) by mouth daily Start with 1/2 tab daily for 4 days then increase to one tab daily  (Patient not taking: Reported on 11/30/2021 ), Disp: 30 tablet, Rfl: 5    tiZANidine (ZANAFLEX) 2 mg tablet, Take 2 mg by mouth daily as needed (Patient not taking: Reported on 10/19/2021), Disp: , Rfl:   Allergies   Allergen Reactions    Bactrim [Sulfamethoxazole-Trimethoprim]     Lyrica [Pregabalin]     Pneumovax 23 [Pneumococcal Vac Polyvalent]     Pollen Extract     Prevnar 13 [Pneumococcal 13-Deandra Conj Vacc] Swelling    Sertraline Chest Pain    Trazodone Other (See Comments)     Dry mouth     Zithromax [Azithromycin]      Past Surgical History:   Procedure Laterality Date    BAND HEMORRHOIDECTOMY      internal    BREAST LUMPECTOMY Right 10/2016    CHOLECYSTECTOMY      COLONOSCOPY      2016, "very difficult exam" per H&P    TONSILLECTOMY      TOOTH EXTRACTION      TOTAL ABDOMINAL HYSTERECTOMY W/ BILATERAL SALPINGOOPHORECTOMY  08/04/2021    TUBAL LIGATION          Physical Exam  Constitutional:       Appearance: Normal appearance  Skin:     General: Skin is warm and dry  Comments: No infection present, area slight irregular, non tender  Neurological:      Mental Status: She is alert and oriented to person, place, and time  Psychiatric:         Mood and Affect: Mood normal          Behavior: Behavior normal          Thought Content:  Thought content normal          Judgment: Judgment normal

## 2022-02-21 ENCOUNTER — OFFICE VISIT (OUTPATIENT)
Dept: FAMILY MEDICINE CLINIC | Facility: CLINIC | Age: 77
End: 2022-02-21
Payer: COMMERCIAL

## 2022-02-21 VITALS
WEIGHT: 114 LBS | HEART RATE: 75 BPM | DIASTOLIC BLOOD PRESSURE: 80 MMHG | OXYGEN SATURATION: 99 % | TEMPERATURE: 97.8 F | BODY MASS INDEX: 19.46 KG/M2 | HEIGHT: 64 IN | SYSTOLIC BLOOD PRESSURE: 140 MMHG

## 2022-02-21 DIAGNOSIS — A04.72 C. DIFFICILE COLITIS: Primary | ICD-10-CM

## 2022-02-21 PROCEDURE — 3077F SYST BP >= 140 MM HG: CPT | Performed by: FAMILY MEDICINE

## 2022-02-21 PROCEDURE — 1160F RVW MEDS BY RX/DR IN RCRD: CPT | Performed by: FAMILY MEDICINE

## 2022-02-21 PROCEDURE — 1036F TOBACCO NON-USER: CPT | Performed by: FAMILY MEDICINE

## 2022-02-21 PROCEDURE — 99213 OFFICE O/P EST LOW 20 MIN: CPT | Performed by: FAMILY MEDICINE

## 2022-02-21 PROCEDURE — 3079F DIAST BP 80-89 MM HG: CPT | Performed by: FAMILY MEDICINE

## 2022-02-21 RX ORDER — VANCOMYCIN HYDROCHLORIDE 125 MG/1
CAPSULE ORAL
COMMUNITY
Start: 2022-02-11 | End: 2022-02-21 | Stop reason: SDUPTHER

## 2022-02-21 RX ORDER — VANCOMYCIN HYDROCHLORIDE 125 MG/1
125 CAPSULE ORAL 4 TIMES DAILY
Qty: 28 CAPSULE | Refills: 0 | Status: SHIPPED | OUTPATIENT
Start: 2022-02-21 | End: 2022-02-28

## 2022-02-21 NOTE — PROGRESS NOTES
Chief Complaint   Patient presents with    Follow-up     Talk about Medication    Abdominal Pain     Assessment/Plan:  Add Gatorade, need electrolytes, bananas, toast, crackers  Follow up with colo-rectal Dr Aniya Jacobson: Body mass index is 19 57 kg/m²  The BMI in normal range  PHQ-2/9 Depression Screening            Diagnoses and all orders for this visit:    C  difficile colitis  -     vancomycin (VANCOCIN) 125 MG capsule; Take 1 capsule (125 mg total) by mouth 4 (four) times a day for 7 days    Other orders  -     Discontinue: vancomycin (VANCOCIN) 125 MG capsule          Subjective:      Patient ID: Judson Raygoza is a 77 y o  female  Was treated for C-Diff for 10 days with vancomycin  Was going 10 - 12 watertery stools a day, now going 4 - 5 soft stools  Has last med today  The following portions of the patient's history were reviewed and updated as appropriate: allergies, current medications, past medical history, past social history, past surgical history and problem list     Review of Systems   Constitutional: Negative  HENT: Negative  Eyes: Negative  Respiratory: Negative  Cardiovascular: Negative  Gastrointestinal: Negative for diarrhea  Soft stools  Genitourinary: Negative  Musculoskeletal: Negative  Skin: Negative  Neurological: Negative  Psychiatric/Behavioral: Negative            Objective:      /80 (BP Location: Left arm, Patient Position: Sitting, Cuff Size: Large)   Pulse 75   Temp 97 8 °F (36 6 °C) (Temporal)   Ht 5' 4" (1 626 m)   Wt 51 7 kg (114 lb)   LMP  (LMP Unknown)   SpO2 99%   BMI 19 57 kg/m²     Current Outpatient Medications:     acetaminophen (TYLENOL) 650 mg CR tablet, Take 650 mg by mouth every 8 (eight) hours as needed, Disp: , Rfl:     amLODIPine (NORVASC) 2 5 mg tablet, Take 1 tablet (2 5 mg total) by mouth daily, Disp: 90 tablet, Rfl: 1    benazepril (LOTENSIN) 20 mg tablet, Take 1 tablet (20 mg total) by mouth 2 (two) times a day, Disp: 180 tablet, Rfl: 1    Calcium Glycerophosphate (PRELIEF PO), Take 2 tablets by mouth as needed, Disp: , Rfl:     cyanocobalamin (VITAMIN B-12) 100 mcg tablet, Take 50 mcg by mouth daily, Disp: , Rfl:     hydrocortisone 2 5 % cream, Apply topically 3 (three) times a day as needed (as needed), Disp: 15 g, Rfl: 0    LORazepam (ATIVAN) 0 5 mg tablet, Take 1 tablet (0 5 mg total) by mouth daily at bedtime, Disp: 30 tablet, Rfl: 3    mesalamine (CANASA) 1,000 mg suppository, Insert 1 suppository (1,000 mg total) into the rectum daily at bedtime for 180 days, Disp: 30 suppository, Rfl: 5    omeprazole (PriLOSEC) 20 mg delayed release capsule, Take 20 mg by mouth daily , Disp: , Rfl:     vancomycin (VANCOCIN) 125 MG capsule, , Disp: , Rfl:     CALCIUM CITRATE PO, Take by mouth (Patient not taking: Reported on 10/19/2021), Disp: , Rfl:     cholecalciferol (VITAMIN D3) 1,000 units tablet, Take 1,000 Units by mouth daily   (Patient not taking: Reported on 1/12/2022 ), Disp: , Rfl:     Diclofenac Sodium (VOLTAREN) 1 %, Apply 1 application topically 4 (four) times a day (Patient not taking: Reported on 1/12/2022 ), Disp: , Rfl:   Allergies   Allergen Reactions    Bactrim [Sulfamethoxazole-Trimethoprim]     Lyrica [Pregabalin]     Pneumovax 23 [Pneumococcal Vac Polyvalent]     Pollen Extract     Prevnar 13 [Pneumococcal 13-Deandra Conj Vacc] Swelling    Sertraline Chest Pain    Trazodone Other (See Comments)     Dry mouth     Zithromax [Azithromycin]      Past Surgical History:   Procedure Laterality Date    BAND HEMORRHOIDECTOMY      internal    BREAST LUMPECTOMY Right 10/2016    CHOLECYSTECTOMY      COLONOSCOPY      2016, "very difficult exam" per H&P    TONSILLECTOMY      TOOTH EXTRACTION      TOTAL ABDOMINAL HYSTERECTOMY W/ BILATERAL SALPINGOOPHORECTOMY  08/04/2021    TUBAL LIGATION              Physical Exam  Constitutional:       Appearance: She is well-developed  HENT:      Head: Normocephalic and atraumatic  Right Ear: External ear normal       Left Ear: External ear normal       Nose: Nose normal       Mouth/Throat:      Mouth: Mucous membranes are moist       Pharynx: Oropharynx is clear  Eyes:      Conjunctiva/sclera: Conjunctivae normal       Pupils: Pupils are equal, round, and reactive to light  Cardiovascular:      Rate and Rhythm: Normal rate and regular rhythm  Heart sounds: Normal heart sounds  Pulmonary:      Effort: Pulmonary effort is normal       Breath sounds: Normal breath sounds  Abdominal:      General: Abdomen is flat  Bowel sounds are normal       Palpations: Abdomen is soft  Tenderness: There is no abdominal tenderness  Musculoskeletal:      Cervical back: Normal range of motion and neck supple  Skin:     General: Skin is warm and dry  Neurological:      General: No focal deficit present  Mental Status: She is alert and oriented to person, place, and time  Deep Tendon Reflexes: Reflexes are normal and symmetric  Psychiatric:         Mood and Affect: Mood normal          Behavior: Behavior normal          Thought Content:  Thought content normal          Judgment: Judgment normal

## 2022-04-12 ENCOUNTER — OFFICE VISIT (OUTPATIENT)
Dept: FAMILY MEDICINE CLINIC | Facility: CLINIC | Age: 77
End: 2022-04-12
Payer: COMMERCIAL

## 2022-04-12 VITALS
TEMPERATURE: 98.2 F | OXYGEN SATURATION: 96 % | RESPIRATION RATE: 14 BRPM | HEIGHT: 64 IN | HEART RATE: 82 BPM | SYSTOLIC BLOOD PRESSURE: 144 MMHG | DIASTOLIC BLOOD PRESSURE: 82 MMHG | WEIGHT: 118.2 LBS | BODY MASS INDEX: 20.18 KG/M2

## 2022-04-12 DIAGNOSIS — Z78.0 POST-MENOPAUSE: Primary | ICD-10-CM

## 2022-04-12 DIAGNOSIS — G47.00 INSOMNIA, UNSPECIFIED TYPE: ICD-10-CM

## 2022-04-12 DIAGNOSIS — Z00.00 MEDICARE ANNUAL WELLNESS VISIT, SUBSEQUENT: ICD-10-CM

## 2022-04-12 DIAGNOSIS — F41.0 PANIC ATTACK: ICD-10-CM

## 2022-04-12 PROCEDURE — 3079F DIAST BP 80-89 MM HG: CPT | Performed by: FAMILY MEDICINE

## 2022-04-12 PROCEDURE — 1160F RVW MEDS BY RX/DR IN RCRD: CPT | Performed by: FAMILY MEDICINE

## 2022-04-12 PROCEDURE — 1036F TOBACCO NON-USER: CPT | Performed by: FAMILY MEDICINE

## 2022-04-12 PROCEDURE — 1170F FXNL STATUS ASSESSED: CPT | Performed by: FAMILY MEDICINE

## 2022-04-12 PROCEDURE — 99213 OFFICE O/P EST LOW 20 MIN: CPT | Performed by: FAMILY MEDICINE

## 2022-04-12 PROCEDURE — 1125F AMNT PAIN NOTED PAIN PRSNT: CPT | Performed by: FAMILY MEDICINE

## 2022-04-12 PROCEDURE — G0439 PPPS, SUBSEQ VISIT: HCPCS | Performed by: FAMILY MEDICINE

## 2022-04-12 PROCEDURE — 3077F SYST BP >= 140 MM HG: CPT | Performed by: FAMILY MEDICINE

## 2022-04-12 PROCEDURE — 1090F PRES/ABSN URINE INCON ASSESS: CPT | Performed by: FAMILY MEDICINE

## 2022-04-12 PROCEDURE — 3288F FALL RISK ASSESSMENT DOCD: CPT | Performed by: FAMILY MEDICINE

## 2022-04-12 RX ORDER — ONDANSETRON 4 MG/1
TABLET, ORALLY DISINTEGRATING ORAL
COMMUNITY
Start: 2022-02-26

## 2022-04-12 RX ORDER — LACTOBACILLUS RHAMNOSUS GG 10B CELL
1 CAPSULE ORAL EVERY EVENING
COMMUNITY

## 2022-04-12 RX ORDER — LORAZEPAM 0.5 MG/1
0.5 TABLET ORAL
Qty: 30 TABLET | Refills: 3 | Status: SHIPPED | OUTPATIENT
Start: 2022-04-12 | End: 2022-08-08 | Stop reason: DRUGHIGH

## 2022-04-12 NOTE — PROGRESS NOTES
Assessment and Plan:     Problem List Items Addressed This Visit     None      Visit Diagnoses     Post-menopause    -  Primary    Panic attack        Medicare annual wellness visit, subsequent               Preventive health issues were discussed with patient, and age appropriate screening tests were ordered as noted in patient's After Visit Summary  Personalized health advice and appropriate referrals for health education or preventive services given if needed, as noted in patient's After Visit Summary       History of Present Illness:     Patient presents for Medicare Annual Wellness visit    Patient Care Team:  Roxy Jacobo DO as PCP - Dozier Boeck, DO Casimir Harsh, MD Donnice Frater, DO Melecio Jungling, MD (Inactive)     Problem List:     Patient Active Problem List   Diagnosis    Contusion of left elbow    Essential hypertension    Current mild episode of major depressive disorder without prior episode ([facility] [facility]    Tremor    Breast cancer, stage 1, right ([facility] [address] [facility] )    Johnston's esophagus with dysplasia    Panic attacks      Past Medical and Surgical History:     Past Medical History:   Diagnosis Date    Allergic     Anxiety     Arthritis     Johnston's esophagus 09/2020    Bulging discs     Cancer ([facility] [address] ) Breast    R breast    Chronic interstitial cystitis     Colon polyp     Diverticulosis     Elevated amylase     Last Assessed:  3/24/15    Fibromyalgia, primary     GERD (gastroesophageal reflux disease)     Hyperlipidemia     Hypertension     Irritable bowel syndrome     Migraine     Pelvic floor dysfunction     Pneumonia     Psychiatric disorder     Ribs, multiple fractures     2/2019    Serum lipase elevation     Last Assessed:  9/17/13    Vitamin D deficiency      Past Surgical History:   Procedure Laterality Date    BAND HEMORRHOIDECTOMY      internal    BREAST LUMPECTOMY Right 10/2016    CHOLECYSTECTOMY      COLONOSCOPY      2016, "very difficult exam" per H&P    TONSILLECTOMY      TOOTH EXTRACTION      TOTAL ABDOMINAL HYSTERECTOMY W/ BILATERAL SALPINGOOPHORECTOMY  2021    TUBAL LIGATION        Family History:     Family History   Problem Relation Age of Onset    Hypertension Mother     Heart disease Father     Hypertension Father     Stroke Father         Syndrome      Social History:     Social History     Socioeconomic History    Marital status: /Civil Union     Spouse name: None    Number of children: None    Years of education: None    Highest education level: None   Occupational History    Occupation: Retired   Tobacco Use    Smoking status: Former Smoker     Packs/day: 0 50     Types: Cigarettes     Start date:      Quit date:      Years since quittin 2    Smokeless tobacco: Never Used   Vaping Use    Vaping Use: Never used   Substance and Sexual Activity    Alcohol use: Never     Comment: social    Drug use: No     Comment: CBD oil with tea at night    Sexual activity: Not Currently   Other Topics Concern    None   Social History Narrative    Uses safety equipment     Social Determinants of Health     Financial Resource Strain: Not on file   Food Insecurity: Not on file   Transportation Needs: Not on file   Physical Activity: Not on file   Stress: Not on file   Social Connections: Not on file   Intimate Partner Violence: Not on file   Housing Stability: Not on file      Medications and Allergies:     Current Outpatient Medications   Medication Sig Dispense Refill    acetaminophen (TYLENOL) 650 mg CR tablet Take 650 mg by mouth every 8 (eight) hours as needed      amLODIPine (NORVASC) 2 5 mg tablet Take 1 tablet (2 5 mg total) by mouth daily 90 tablet 1    benazepril (LOTENSIN) 20 mg tablet Take 1 tablet (20 mg total) by mouth 2 (two) times a day 180 tablet 1    Calcium Glycerophosphate (PRELIEF PO) Take 2 tablets by mouth as needed      Lactobacillus Rhamnosus, GG, (Culturelle Kids) CHEW Chew 1 tablet every evening  LORazepam (ATIVAN) 0 5 mg tablet Take 1 tablet (0 5 mg total) by mouth daily at bedtime 30 tablet 3    omeprazole (PriLOSEC) 20 mg delayed release capsule Take 20 mg by mouth daily TAKEN EVERY OTHER DAY       CALCIUM CITRATE PO Take by mouth (Patient not taking: Reported on 4/12/2022 )      cholecalciferol (VITAMIN D3) 1,000 units tablet Take 1,000 Units by mouth daily   (Patient not taking: Reported on 4/12/2022 )      Diclofenac Sodium (VOLTAREN) 1 % Apply 1 application topically 4 (four) times a day (Patient not taking: Reported on 1/12/2022 )      mesalamine (CANASA) 1,000 mg suppository Insert 1 suppository (1,000 mg total) into the rectum daily at bedtime for 180 days 30 suppository 5    ondansetron (ZOFRAN-ODT) 4 mg disintegrating tablet  (Patient not taking: Reported on 4/12/2022 )       No current facility-administered medications for this visit       Allergies   Allergen Reactions    Bactrim [Sulfamethoxazole-Trimethoprim]     Doxycycline GI Intolerance    Lyrica [Pregabalin]     Pneumovax 23 [Pneumococcal Vac Polyvalent]     Pollen Extract     Prevnar 13 [Pneumococcal 13-Deandra Conj Vacc] Swelling    Sertraline Chest Pain    Trazodone Other (See Comments)     Dry mouth     Zithromax [Azithromycin]       Immunizations:     Immunization History   Administered Date(s) Administered    COVID-19 PFIZER VACCINE 0 3 ML IM 02/23/2021, 03/14/2021, 10/05/2021    COVID-19 Pfizer vac 5-11y esme-sucrose 0 2 mL IM (orange cap) 04/01/2022    INFLUENZA 12/09/2014, 10/05/2015, 10/05/2015, 10/04/2016    Influenza Quadrivalent 3 years and older 10/01/2020    Influenza Split High Dose Preservative Free IM 10/04/2016    Influenza, high dose seasonal 0 7 mL 10/11/2019    Rabies, Intramuscular 03/17/2016, 03/20/2016    Tdap 07/12/2021      Health Maintenance:         Topic Date Due    Breast Cancer Screening: Mammogram  07/26/2020    Colorectal Cancer Screening  08/26/2029    Hepatitis C Screening Completed         Topic Date Due    Pneumococcal Vaccine: 65+ Years (1 of 1 - PPSV23) Never done    Influenza Vaccine (1) 09/01/2021      Medicare Health Risk Assessment:     /82 (BP Location: Left arm, Patient Position: Sitting, Cuff Size: Standard)   Pulse 82   Temp 98 2 °F (36 8 °C) (Tympanic)   Resp 14   Ht 5' 4" (1 626 m)   Wt 53 6 kg (118 lb 3 2 oz)   LMP  (LMP Unknown)   SpO2 96%   BMI 20 29 kg/m²      Javan Rawls is here for her Subsequent Wellness visit  Health Risk Assessment:   Patient rates overall health as fair  Patient feels that their physical health rating is slightly worse  Patient is satisfied with their life  Eyesight was rated as same  Hearing was rated as same  Patient feels that their emotional and mental health rating is slightly worse  Patients states they are often angry  Patient states they are often unusually tired/fatigued  Pain experienced in the last 7 days has been some  Patient's pain rating has been 7/10  Patient states that she has experienced weight loss or gain in last 6 months  Right Hip    Fall Risk Screening: In the past year, patient has experienced: no history of falling in past year      Urinary Incontinence Screening:   Patient has not leaked urine accidently in the last six months  Home Safety:  Patient has trouble with stairs inside or outside of their home  Patient has working smoke alarms and has working carbon monoxide detector  Home safety hazards include: none  Nutrition:   Current diet is Other (please comment)  No Gluten, No Dairy, No Acid    Medications:   Patient is currently taking over-the-counter supplements  OTC medications include: see medication list  Patient is able to manage medications  Activities of Daily Living (ADLs)/Instrumental Activities of Daily Living (IADLs):   Walk and transfer into and out of bed and chair?: Yes  Dress and groom yourself?: Yes    Bathe or shower yourself?: Yes    Feed yourself?  Yes  Do your laundry/housekeeping?: Yes  Manage your money, pay your bills and track your expenses?: Yes  Make your own meals?: Yes    Do your own shopping?: Yes    Previous Hospitalizations:   Any hospitalizations or ED visits within the last 12 months?: Yes    How many hospitalizations have you had in the last year?: 1-2    Advance Care Planning:   Living will: No    Durable POA for healthcare: No    Advanced directive: No    Five wishes given: Yes      Cognitive Screening:   Provider or family/friend/caregiver concerned regarding cognition?: No    PREVENTIVE SCREENINGS      Cardiovascular Screening:    General: Screening Current      Diabetes Screening:     General: Screening Current      Colorectal Cancer Screening:     General: Screening Current      Breast Cancer Screening:     General: History Breast Cancer      Cervical Cancer Screening:    General: Screening Not Indicated      Osteoporosis Screening:    General: Risks and Benefits Discussed      Abdominal Aortic Aneurysm (AAA) Screening:        General: Screening Not Indicated      Lung Cancer Screening:     General: Screening Not Indicated      Hepatitis C Screening:    General: Screening Current    Screening, Brief Intervention, and Referral to Treatment (SBIRT)    Screening  Typical number of drinks in a day: 0  Typical number of drinks in a week: 0  Interpretation: Low risk drinking behavior      AUDIT-C Screenin) How often did you have a drink containing alcohol in the past year? never  2) How many drinks did you have on a typical day when you were drinking in the past year? 0  3) How often did you have 6 or more drinks on one occasion in the past year? never    AUDIT-C Score: 0  Interpretation: Score 0-2 (female): Negative screen for alcohol misuse    Single Item Drug Screening:  How often have you used an illegal drug (including marijuana) or a prescription medication for non-medical reasons in the past year? never    Single Item Drug Screen Score: 0  Interpretation: Negative screen for possible drug use disorder      Leidy Anderson, DO

## 2022-04-12 NOTE — PROGRESS NOTES
Assessment/Plan:  DEXA scan, refill Ativan  Diagnoses and all orders for this visit:    Post-menopause  -     DXA bone density spine hip and pelvis; Future    Panic attack  -     LORazepam (ATIVAN) 0 5 mg tablet; Take 1 tablet (0 5 mg total) by mouth daily at bedtime    Medicare annual wellness visit, subsequent    Insomnia, unspecified type    Other orders  -     Lactobacillus Rhamnosus, GG, (Culturelle Kids) CHEW; Chew 1 tablet every evening  -     ondansetron (ZOFRAN-ODT) 4 mg disintegrating tablet;  (Patient not taking: Reported on 4/12/2022 )          Subjective:      Patient ID: Margy Joya is a 77 y o  female  Refill med's  Problem getting to sleep past 2 months  BM's have decreased to 2 - 3 times a day - stopped gluten and daily  Scheduled for a colonoscopy  The following portions of the patient's history were reviewed and updated as appropriate: allergies, current medications, past medical history, past social history, past surgical history and problem list     Review of Systems   Constitutional: Negative  HENT: Negative  Eyes: Negative  Respiratory: Negative  Cardiovascular: Negative  Gastrointestinal: Negative  Genitourinary: Negative  Musculoskeletal: Negative  Skin: Negative  Neurological: Negative  Psychiatric/Behavioral: Negative  Objective:      /82 (BP Location: Left arm, Patient Position: Sitting, Cuff Size: Standard)   Pulse 82   Temp 98 2 °F (36 8 °C) (Tympanic)   Resp 14   Ht 5' 4" (1 626 m)   Wt 53 6 kg (118 lb 3 2 oz)   LMP  (LMP Unknown)   SpO2 96%   BMI 20 29 kg/m²          Physical Exam  Constitutional:       Appearance: She is well-developed  HENT:      Head: Normocephalic and atraumatic  Right Ear: External ear normal       Left Ear: External ear normal       Nose: Nose normal    Eyes:      Conjunctiva/sclera: Conjunctivae normal       Pupils: Pupils are equal, round, and reactive to light  Cardiovascular:      Rate and Rhythm: Normal rate and regular rhythm  Heart sounds: Normal heart sounds  Pulmonary:      Effort: Pulmonary effort is normal       Breath sounds: Normal breath sounds  Abdominal:      General: Abdomen is flat  Bowel sounds are normal       Palpations: Abdomen is soft  Tenderness: There is no abdominal tenderness  Musculoskeletal:      Cervical back: Normal range of motion and neck supple  Skin:     General: Skin is warm and dry  Neurological:      Mental Status: She is alert and oriented to person, place, and time  Deep Tendon Reflexes: Reflexes are normal and symmetric  Psychiatric:         Behavior: Behavior normal          Thought Content:  Thought content normal          Judgment: Judgment normal

## 2022-04-13 ENCOUNTER — TELEPHONE (OUTPATIENT)
Dept: ADMINISTRATIVE | Facility: OTHER | Age: 77
End: 2022-04-13

## 2022-04-13 NOTE — TELEPHONE ENCOUNTER
Upon review of the In Basket request we were able to locate, review, and update the patient chart as requested for Mammogram     Any additional questions or concerns should be emailed to the Practice Liaisons via [email or web address] email, please do not reply via In Basket      Thank you  Camilla Carey MA

## 2022-04-13 NOTE — TELEPHONE ENCOUNTER
----- Message from Shannon Purcell MA sent at 4/12/2022  9:46 AM EDT -----  Regarding: Request Quality  04/12/22 9:46 AM    Hello, our patient Mary Gonzalez has had Mammogram completed/performed  Please assist in updating the patient chart by making an External outreach to [facility] facility located in [address] [place] [id number]  The date of service is 8-10/2021      Thank you,  David Merino MA  [facility]

## 2022-05-04 ENCOUNTER — HOSPITAL ENCOUNTER (OUTPATIENT)
Dept: RADIOLOGY | Age: 77
Discharge: HOME | End: 2022-05-04
Payer: COMMERCIAL

## 2022-05-04 DIAGNOSIS — Z78.0 POST-MENOPAUSE: ICD-10-CM

## 2022-05-04 PROCEDURE — 77080 DXA BONE DENSITY AXIAL: CPT

## 2022-06-08 ENCOUNTER — OFFICE VISIT (OUTPATIENT)
Dept: FAMILY MEDICINE CLINIC | Facility: CLINIC | Age: 77
End: 2022-06-08
Payer: COMMERCIAL

## 2022-06-08 VITALS
HEIGHT: 64 IN | WEIGHT: 117 LBS | TEMPERATURE: 97.9 F | DIASTOLIC BLOOD PRESSURE: 82 MMHG | OXYGEN SATURATION: 97 % | SYSTOLIC BLOOD PRESSURE: 138 MMHG | BODY MASS INDEX: 19.97 KG/M2 | HEART RATE: 73 BPM

## 2022-06-08 DIAGNOSIS — F33.0 MILD EPISODE OF RECURRENT MAJOR DEPRESSIVE DISORDER (HCC): ICD-10-CM

## 2022-06-08 DIAGNOSIS — M81.0 AGE-RELATED OSTEOPOROSIS WITHOUT CURRENT PATHOLOGICAL FRACTURE: Primary | ICD-10-CM

## 2022-06-08 DIAGNOSIS — F43.9 STRESS AT HOME: ICD-10-CM

## 2022-06-08 DIAGNOSIS — I10 ESSENTIAL HYPERTENSION: ICD-10-CM

## 2022-06-08 DIAGNOSIS — E55.9 VITAMIN D DEFICIENCY: ICD-10-CM

## 2022-06-08 PROCEDURE — 3075F SYST BP GE 130 - 139MM HG: CPT | Performed by: FAMILY MEDICINE

## 2022-06-08 PROCEDURE — 1160F RVW MEDS BY RX/DR IN RCRD: CPT | Performed by: FAMILY MEDICINE

## 2022-06-08 PROCEDURE — 3079F DIAST BP 80-89 MM HG: CPT | Performed by: FAMILY MEDICINE

## 2022-06-08 PROCEDURE — 99214 OFFICE O/P EST MOD 30 MIN: CPT | Performed by: FAMILY MEDICINE

## 2022-06-08 PROCEDURE — 1036F TOBACCO NON-USER: CPT | Performed by: FAMILY MEDICINE

## 2022-06-08 RX ORDER — BENAZEPRIL HYDROCHLORIDE 20 MG/1
20 TABLET ORAL 2 TIMES DAILY
Qty: 180 TABLET | Refills: 1 | Status: SHIPPED | OUTPATIENT
Start: 2022-06-08

## 2022-06-08 RX ORDER — AMLODIPINE BESYLATE 2.5 MG/1
2.5 TABLET ORAL DAILY
Qty: 90 TABLET | Refills: 1 | Status: SHIPPED | OUTPATIENT
Start: 2022-06-08 | End: 2022-12-05

## 2022-06-08 NOTE — PROGRESS NOTES
Chief Complaint   Patient presents with    Follow-up     Review DXA scan     Assessment/Plan:  Supportive shoes  Need to get out of the house  Exercise bike - don't want knees bending > 90 degrees - seat has to be high enough  Follow up with ortho  Vitamin D, Ca++, Mg++  Diagnoses and all orders for this visit:    Age-related osteoporosis without current pathological fracture    Vitamin D deficiency    Essential hypertension  -     amLODIPine (NORVASC) 2 5 mg tablet; Take 1 tablet (2 5 mg total) by mouth daily  -     benazepril (LOTENSIN) 20 mg tablet; Take 1 tablet (20 mg total) by mouth 2 (two) times a day    Mild episode of recurrent major depressive disorder (HCC)  -     sertraline (Zoloft) 50 mg tablet; Take 1 tablet (50 mg total) by mouth daily start with 1/2 tab for 5 days then increase to one tab daily  Stress at home          Subjective:      Patient ID: Jose Manuel Gallegos is a 77 y o  female  Refill, review DEXA scan - some osteoporosis lumbar spine  Sees ortho for hips -[facility]  Gets BL hip pains that travel down thighs  BL feet and toes feel stiff, has been wearing flip flops  Feels miserable and grumpy during the day  Depression  Has used Zoloft in past but stopped didn't see results - didn't give enough time  SH; neg tob or OH  The following portions of the patient's history were reviewed and updated as appropriate: allergies, current medications, past medical history, past social history, past surgical history and problem list   I have spent 25 minutes with Patient  today in which greater than 50% of this time was spent in counseling/coordination of care regarding Diagnostic results, Risks and benefits of tx options, Intructions for management, Patient and family education, Importance of tx compliance, Risk factor reductions and Impressions  Review of Systems   Constitutional: Negative  HENT: Negative  Eyes: Negative  Respiratory: Negative  Cardiovascular: Negative  Gastrointestinal: Negative  Genitourinary: Negative  Musculoskeletal: Positive for arthralgias  BL hip weakness  Skin: Negative  Neurological: Negative  Psychiatric/Behavioral: Negative  Objective:      /82 (BP Location: Left arm, Patient Position: Sitting, Cuff Size: Standard)   Pulse 73   Temp 97 9 °F (36 6 °C) (Tympanic)   Ht 5' 4" (1 626 m)   Wt 53 1 kg (117 lb)   LMP  (LMP Unknown)   SpO2 97%   BMI 20 08 kg/m²     Current Outpatient Medications:     acetaminophen (TYLENOL) 650 mg CR tablet, Take 650 mg by mouth every 8 (eight) hours as needed, Disp: , Rfl:     amLODIPine (NORVASC) 2 5 mg tablet, Take 1 tablet (2 5 mg total) by mouth daily, Disp: 90 tablet, Rfl: 1    benazepril (LOTENSIN) 20 mg tablet, Take 1 tablet (20 mg total) by mouth 2 (two) times a day, Disp: 180 tablet, Rfl: 1    Lactobacillus Rhamnosus, GG, (Culturelle Kids) CHEW, Chew 1 tablet every evening, Disp: , Rfl:     LORazepam (ATIVAN) 0 5 mg tablet, Take 1 tablet (0 5 mg total) by mouth daily at bedtime, Disp: 30 tablet, Rfl: 3    mesalamine (CANASA) 1,000 mg suppository, Insert 1 suppository (1,000 mg total) into the rectum daily at bedtime for 180 days (Patient taking differently: Insert 1,000 mg into the rectum if needed), Disp: 30 suppository, Rfl: 5    omeprazole (PriLOSEC) 20 mg delayed release capsule, Take 20 mg by mouth daily TAKEN EVERY OTHER DAY , Disp: , Rfl:     sertraline (Zoloft) 50 mg tablet, Take 1 tablet (50 mg total) by mouth daily start with 1/2 tab for 5 days then increase to one tab daily  , Disp: 30 tablet, Rfl: 3    CALCIUM CITRATE PO, Take by mouth (Patient not taking: Reported on 4/12/2022 ), Disp: , Rfl:     Calcium Glycerophosphate (PRELIEF PO), Take 2 tablets by mouth as needed (Patient not taking: Reported on 6/8/2022), Disp: , Rfl:     cholecalciferol (VITAMIN D3) 1,000 units tablet, Take 1,000 Units by mouth daily   (Patient not taking: Reported on 4/12/2022 ), Disp: , Rfl:     Diclofenac Sodium (VOLTAREN) 1 %, Apply 1 application topically 4 (four) times a day (Patient not taking: Reported on 1/12/2022 ), Disp: , Rfl:     ondansetron (ZOFRAN-ODT) 4 mg disintegrating tablet, , Disp: , Rfl:   Allergies   Allergen Reactions    Bactrim [Sulfamethoxazole-Trimethoprim]     Doxycycline GI Intolerance    Lyrica [Pregabalin]     Pneumovax 23 [Pneumococcal Vac Polyvalent]     Pollen Extract     Prevnar 13 [Pneumococcal 13-Deandra Conj Vacc] Swelling    Sertraline Chest Pain    Trazodone Other (See Comments)     Dry mouth     Zithromax [Azithromycin]      Past Surgical History:   Procedure Laterality Date    BAND HEMORRHOIDECTOMY      internal    BREAST LUMPECTOMY Right 10/2016    CHOLECYSTECTOMY      COLONOSCOPY      2016, "very difficult exam" per H&P    TONSILLECTOMY      TOOTH EXTRACTION      TOTAL ABDOMINAL HYSTERECTOMY W/ BILATERAL SALPINGOOPHORECTOMY  08/04/2021    TUBAL LIGATION              Physical Exam  Constitutional:       Appearance: She is well-developed  HENT:      Head: Normocephalic and atraumatic  Right Ear: External ear normal       Left Ear: External ear normal       Nose: Nose normal    Eyes:      Conjunctiva/sclera: Conjunctivae normal       Pupils: Pupils are equal, round, and reactive to light  Cardiovascular:      Rate and Rhythm: Normal rate and regular rhythm  Heart sounds: Normal heart sounds  Pulmonary:      Effort: Pulmonary effort is normal       Breath sounds: Normal breath sounds  Abdominal:      General: Bowel sounds are normal       Palpations: Abdomen is soft  Musculoskeletal:      Cervical back: Normal range of motion and neck supple  Skin:     General: Skin is warm and dry  Neurological:      Mental Status: She is alert and oriented to person, place, and time  Deep Tendon Reflexes: Reflexes are normal and symmetric     Psychiatric:         Behavior: Behavior normal  Thought Content:  Thought content normal          Judgment: Judgment normal

## 2022-08-08 ENCOUNTER — OFFICE VISIT (OUTPATIENT)
Dept: FAMILY MEDICINE CLINIC | Facility: CLINIC | Age: 77
End: 2022-08-08
Payer: COMMERCIAL

## 2022-08-08 VITALS
TEMPERATURE: 97.5 F | HEART RATE: 70 BPM | DIASTOLIC BLOOD PRESSURE: 78 MMHG | SYSTOLIC BLOOD PRESSURE: 170 MMHG | HEIGHT: 64 IN | BODY MASS INDEX: 20.08 KG/M2 | OXYGEN SATURATION: 95 %

## 2022-08-08 DIAGNOSIS — C50.911 BREAST CANCER, STAGE 1, RIGHT (HCC): ICD-10-CM

## 2022-08-08 DIAGNOSIS — F41.0 PANIC ATTACKS: ICD-10-CM

## 2022-08-08 DIAGNOSIS — Z12.31 BREAST CANCER SCREENING BY MAMMOGRAM: Primary | ICD-10-CM

## 2022-08-08 DIAGNOSIS — F41.8 ANXIETY ABOUT HEALTH: ICD-10-CM

## 2022-08-08 DIAGNOSIS — F41.9 ANXIETY: ICD-10-CM

## 2022-08-08 DIAGNOSIS — F43.9 STRESS AT HOME: ICD-10-CM

## 2022-08-08 PROCEDURE — 99213 OFFICE O/P EST LOW 20 MIN: CPT | Performed by: FAMILY MEDICINE

## 2022-08-08 RX ORDER — LORAZEPAM 1 MG/1
1 TABLET ORAL 2 TIMES DAILY PRN
Qty: 60 TABLET | Refills: 3 | Status: SHIPPED | OUTPATIENT
Start: 2022-08-08

## 2022-08-08 NOTE — PROGRESS NOTES
Chief Complaint   Patient presents with    Follow-up    Hypertension    Headache     X 1 week     Assessment/Plan:  Increaswed ativen to 1 0 mg BID as needed  psyc eval and treat  Diagnoses and all orders for this visit:    Breast cancer screening by mammogram  -     Mammo screening bilateral w 3d & cad; Future    Stress at home  -     Ambulatory Referral to Psychiatry; Future    Anxiety  -     LORazepam (ATIVAN) 1 mg tablet; Take 1 tablet (1 mg total) by mouth 2 (two) times a day as needed for anxiety  -     Ambulatory Referral to Psychiatry; Future    Anxiety about health    Breast cancer, stage 1, right (HCC)    Panic attacks          Subjective:      Patient ID: Elkin Tidwell is a 69 y o  female  Stress / anxiety  Family and marital issues  Gets more anxious when thinking about BP increasing with the stress  Have tried Klonopin and Zoloft  The following portions of the patient's history were reviewed and updated as appropriate: allergies, current medications, past medical history, past social history, past surgical history and problem list     Review of Systems   Constitutional: Negative  HENT: Negative  Eyes: Negative  Respiratory: Negative  Cardiovascular: Negative  Gastrointestinal: Negative  Genitourinary: Negative  Musculoskeletal: Negative  Skin: Negative  Neurological: Negative  Psychiatric/Behavioral: The patient is nervous/anxious            Objective:      /78   Pulse 70   Temp 97 5 °F (36 4 °C) (Tympanic)   Ht 5' 4" (1 626 m)   LMP  (LMP Unknown)   SpO2 95%   BMI 20 08 kg/m²       Current Outpatient Medications:     acetaminophen (TYLENOL) 650 mg CR tablet, Take 650 mg by mouth every 8 (eight) hours as needed, Disp: , Rfl:     amLODIPine (NORVASC) 2 5 mg tablet, Take 1 tablet (2 5 mg total) by mouth daily, Disp: 90 tablet, Rfl: 1    benazepril (LOTENSIN) 20 mg tablet, Take 1 tablet (20 mg total) by mouth 2 (two) times a day, Disp: 180 tablet, Rfl: 1    Lactobacillus Rhamnosus, GG, (Culturelle Kids) CHEW, Chew 1 tablet every evening, Disp: , Rfl:     LORazepam (ATIVAN) 1 mg tablet, Take 1 tablet (1 mg total) by mouth 2 (two) times a day as needed for anxiety, Disp: 60 tablet, Rfl: 3    mesalamine (CANASA) 1,000 mg suppository, Insert 1 suppository (1,000 mg total) into the rectum daily at bedtime for 180 days (Patient taking differently: Insert 1,000 mg into the rectum if needed), Disp: 30 suppository, Rfl: 5    omeprazole (PriLOSEC) 20 mg delayed release capsule, Take 20 mg by mouth daily TAKEN EVERY OTHER DAY , Disp: , Rfl:     CALCIUM CITRATE PO, Take by mouth (Patient not taking: Reported on 4/12/2022 ), Disp: , Rfl:     Calcium Glycerophosphate (PRELIEF PO), Take 2 tablets by mouth as needed (Patient not taking: Reported on 6/8/2022), Disp: , Rfl:     cholecalciferol (VITAMIN D3) 1,000 units tablet, Take 1,000 Units by mouth daily   (Patient not taking: Reported on 4/12/2022 ), Disp: , Rfl:     Diclofenac Sodium (VOLTAREN) 1 %, Apply 1 application topically 4 (four) times a day (Patient not taking: Reported on 1/12/2022 ), Disp: , Rfl:     ondansetron (ZOFRAN-ODT) 4 mg disintegrating tablet, , Disp: , Rfl:     sertraline (Zoloft) 50 mg tablet, Take 1 tablet (50 mg total) by mouth daily start with 1/2 tab for 5 days then increase to one tab daily   (Patient not taking: Reported on 8/8/2022), Disp: 30 tablet, Rfl: 3  Allergies   Allergen Reactions    Bactrim [Sulfamethoxazole-Trimethoprim]     Doxycycline GI Intolerance    Lyrica [Pregabalin]     Pneumovax 23 [Pneumococcal Vac Polyvalent]     Pollen Extract     Prevnar 13 [Pneumococcal 13-Deandra Conj Vacc] Swelling    Sertraline Chest Pain    Trazodone Other (See Comments)     Dry mouth     Zithromax [Azithromycin]      Past Surgical History:   Procedure Laterality Date    BAND HEMORRHOIDECTOMY      internal    BREAST LUMPECTOMY Right 10/2016    CHOLECYSTECTOMY      COLONOSCOPY      2016, "very difficult exam" per H&P    TONSILLECTOMY      TOOTH EXTRACTION      TOTAL ABDOMINAL HYSTERECTOMY W/ BILATERAL SALPINGOOPHORECTOMY  08/04/2021    TUBAL LIGATION            Physical Exam  Constitutional:       Appearance: She is well-developed  Comments: Appear anxious  HENT:      Head: Normocephalic and atraumatic  Right Ear: External ear normal       Left Ear: External ear normal       Nose: Nose normal    Eyes:      Conjunctiva/sclera: Conjunctivae normal       Pupils: Pupils are equal, round, and reactive to light  Cardiovascular:      Rate and Rhythm: Normal rate and regular rhythm  Heart sounds: Normal heart sounds  Pulmonary:      Effort: Pulmonary effort is normal       Breath sounds: Normal breath sounds  Abdominal:      General: Bowel sounds are normal       Palpations: Abdomen is soft  Musculoskeletal:      Cervical back: Normal range of motion and neck supple  Skin:     General: Skin is warm and dry  Neurological:      Mental Status: She is alert and oriented to person, place, and time  Deep Tendon Reflexes: Reflexes are normal and symmetric  Psychiatric:         Behavior: Behavior normal          Thought Content:  Thought content normal          Judgment: Judgment normal

## 2022-08-26 ENCOUNTER — RA CDI HCC (OUTPATIENT)
Dept: OTHER | Facility: HOSPITAL | Age: 77
End: 2022-08-26

## 2022-08-26 NOTE — PROGRESS NOTES
[facility]  coding opportunities       Chart reviewed, no opportunity found:   [address]        Patients Insurance     Medicare Insurance: [employer]age

## 2022-08-29 ENCOUNTER — OFFICE VISIT (OUTPATIENT)
Dept: FAMILY MEDICINE CLINIC | Facility: CLINIC | Age: 77
End: 2022-08-29
Payer: COMMERCIAL

## 2022-08-29 VITALS
WEIGHT: 117 LBS | OXYGEN SATURATION: 99 % | HEART RATE: 73 BPM | TEMPERATURE: 98.3 F | HEIGHT: 64 IN | SYSTOLIC BLOOD PRESSURE: 148 MMHG | DIASTOLIC BLOOD PRESSURE: 90 MMHG | BODY MASS INDEX: 19.97 KG/M2

## 2022-08-29 DIAGNOSIS — I10 ELEVATED BLOOD PRESSURE READING IN OFFICE WITH DIAGNOSIS OF HYPERTENSION: Primary | ICD-10-CM

## 2022-08-29 DIAGNOSIS — I10 PRIMARY HYPERTENSION: ICD-10-CM

## 2022-08-29 DIAGNOSIS — F41.8 ANXIETY ABOUT HEALTH: ICD-10-CM

## 2022-08-29 PROCEDURE — 3080F DIAST BP >= 90 MM HG: CPT | Performed by: FAMILY MEDICINE

## 2022-08-29 PROCEDURE — 99213 OFFICE O/P EST LOW 20 MIN: CPT | Performed by: FAMILY MEDICINE

## 2022-08-29 PROCEDURE — 3077F SYST BP >= 140 MM HG: CPT | Performed by: FAMILY MEDICINE

## 2022-08-29 PROCEDURE — 1160F RVW MEDS BY RX/DR IN RCRD: CPT | Performed by: FAMILY MEDICINE

## 2022-08-29 RX ORDER — AMLODIPINE BESYLATE 2.5 MG/1
2.5 TABLET ORAL 2 TIMES DAILY
Qty: 60 TABLET | Refills: 5 | Status: SHIPPED | OUTPATIENT
Start: 2022-08-29 | End: 2022-10-10 | Stop reason: DRUGHIGH

## 2022-08-29 RX ORDER — METOPROLOL SUCCINATE 25 MG/1
25 TABLET, EXTENDED RELEASE ORAL DAILY
Qty: 30 TABLET | Refills: 5 | Status: SHIPPED | OUTPATIENT
Start: 2022-08-29

## 2022-08-29 NOTE — PROGRESS NOTES
Chief Complaint   Patient presents with    Follow-up     For ED    Hypertension     Assessment/Plan:  Add Toprol XL 25 mg  No TV news  Diagnoses and all orders for this visit:    Elevated blood pressure reading in office with diagnosis of hypertension    Primary hypertension  -     amLODIPine (NORVASC) 2 5 mg tablet; Take 1 tablet (2 5 mg total) by mouth 2 (two) times a day  -     metoprolol succinate (Toprol XL) 25 mg 24 hr tablet; Take 1 tablet (25 mg total) by mouth daily    Anxiety about health          Subjective:      Patient ID: Antoine Cortés is a 69 y o  female  Elevated BP  Seen in ER and referred to [id number]  GI told pt to limit Norvasc because of reflux  Anxiety about health - worried about Esophagus - Crow  Has an appointment with cardiology September 19 th  Home BP's are all over  Gets palpations, dizziness, nausea and HA's  Taking BP multiple times a day  SH: BP increases with watching the news  The following portions of the patient's history were reviewed and updated as appropriate: allergies, current medications, past medical history, past social history, past surgical history and problem list     Review of Systems   Constitutional: Negative  HENT: Negative  Eyes: Negative  Respiratory: Negative  Cardiovascular: Negative  Gastrointestinal: Negative  Genitourinary: Negative  Musculoskeletal: Negative  Skin: Negative  Neurological: Negative  Psychiatric/Behavioral: The patient is nervous/anxious            Objective:      /90 (BP Location: Left arm, Patient Position: Sitting, Cuff Size: Standard)   Pulse 73   Temp 98 3 °F (36 8 °C) (Tympanic)   Ht 5' 4" (1 626 m)   Wt 53 1 kg (117 lb)   LMP  (LMP Unknown)   SpO2 99%   BMI 20 08 kg/m²     Current Outpatient Medications:     acetaminophen (TYLENOL) 650 mg CR tablet, Take 650 mg by mouth every 8 (eight) hours as needed, Disp: , Rfl:     amLODIPine (NORVASC) 2 5 mg tablet, Take 1 tablet (2 5 mg total) by mouth daily (Patient taking differently: Take 5 mg by mouth daily), Disp: 90 tablet, Rfl: 1    benazepril (LOTENSIN) 20 mg tablet, Take 1 tablet (20 mg total) by mouth 2 (two) times a day, Disp: 180 tablet, Rfl: 1    Lactobacillus Rhamnosus, GG, (Culturelle Kids) CHEW, Chew 1 tablet every evening, Disp: , Rfl:     LORazepam (ATIVAN) 1 mg tablet, Take 1 tablet (1 mg total) by mouth 2 (two) times a day as needed for anxiety, Disp: 60 tablet, Rfl: 3    omeprazole (PriLOSEC) 20 mg delayed release capsule, Take 20 mg by mouth daily TAKEN EVERY OTHER DAY , Disp: , Rfl:     CALCIUM CITRATE PO, Take by mouth (Patient not taking: Reported on 4/12/2022 ), Disp: , Rfl:     Calcium Glycerophosphate (PRELIEF PO), Take 2 tablets by mouth as needed (Patient not taking: Reported on 6/8/2022), Disp: , Rfl:     cholecalciferol (VITAMIN D3) 1,000 units tablet, Take 1,000 Units by mouth daily   (Patient not taking: Reported on 4/12/2022 ), Disp: , Rfl:     Diclofenac Sodium (VOLTAREN) 1 %, Apply 1 application topically 4 (four) times a day (Patient not taking: Reported on 1/12/2022 ), Disp: , Rfl:     mesalamine (CANASA) 1,000 mg suppository, Insert 1 suppository (1,000 mg total) into the rectum daily at bedtime for 180 days (Patient taking differently: Insert 1,000 mg into the rectum if needed), Disp: 30 suppository, Rfl: 5    ondansetron (ZOFRAN-ODT) 4 mg disintegrating tablet, , Disp: , Rfl:     sertraline (Zoloft) 50 mg tablet, Take 1 tablet (50 mg total) by mouth daily start with 1/2 tab for 5 days then increase to one tab daily   (Patient not taking: Reported on 8/8/2022), Disp: 30 tablet, Rfl: 3  Allergies   Allergen Reactions    Bactrim [Sulfamethoxazole-Trimethoprim]     Doxycycline GI Intolerance    Lyrica [Pregabalin]     Pneumovax 23 [Pneumococcal Vac Polyvalent]     Pollen Extract     Prevnar 13 [Pneumococcal 13-Deandra Conj Vacc] Swelling    Sertraline Chest Pain    Trazodone Other (See Comments)     Dry mouth     Zithromax [Azithromycin]      Past Surgical History:   Procedure Laterality Date    BAND HEMORRHOIDECTOMY      internal    BREAST LUMPECTOMY Right 10/2016    CHOLECYSTECTOMY      COLONOSCOPY      2016, "very difficult exam" per H&P    TONSILLECTOMY      TOOTH EXTRACTION      TOTAL ABDOMINAL HYSTERECTOMY W/ BILATERAL SALPINGOOPHORECTOMY  08/04/2021    TUBAL LIGATION              Physical Exam  Constitutional:       Appearance: She is well-developed  Comments: Sitting on end of table - anxious appearing  HENT:      Head: Normocephalic and atraumatic  Right Ear: External ear normal       Left Ear: External ear normal       Nose: Nose normal    Eyes:      Conjunctiva/sclera: Conjunctivae normal       Pupils: Pupils are equal, round, and reactive to light  Cardiovascular:      Rate and Rhythm: Normal rate and regular rhythm  Heart sounds: Normal heart sounds  Pulmonary:      Effort: Pulmonary effort is normal       Breath sounds: Normal breath sounds  Musculoskeletal:      Cervical back: Normal range of motion and neck supple  Skin:     General: Skin is warm and dry  Neurological:      Mental Status: She is alert and oriented to person, place, and time  Deep Tendon Reflexes: Reflexes are normal and symmetric  Psychiatric:         Behavior: Behavior normal          Thought Content:  Thought content normal          Judgment: Judgment normal

## 2022-09-14 ENCOUNTER — TELEPHONE (OUTPATIENT)
Dept: PSYCHIATRY | Facility: CLINIC | Age: 77
End: 2022-09-14

## 2022-10-10 ENCOUNTER — OFFICE VISIT (OUTPATIENT)
Dept: FAMILY MEDICINE CLINIC | Facility: CLINIC | Age: 77
End: 2022-10-10
Payer: COMMERCIAL

## 2022-10-10 VITALS
HEIGHT: 64 IN | SYSTOLIC BLOOD PRESSURE: 144 MMHG | OXYGEN SATURATION: 94 % | WEIGHT: 118 LBS | DIASTOLIC BLOOD PRESSURE: 80 MMHG | TEMPERATURE: 98.5 F | BODY MASS INDEX: 20.14 KG/M2 | HEART RATE: 76 BPM

## 2022-10-10 DIAGNOSIS — I10 ESSENTIAL HYPERTENSION: Primary | ICD-10-CM

## 2022-10-10 DIAGNOSIS — F41.9 ANXIETY: ICD-10-CM

## 2022-10-10 PROCEDURE — 99213 OFFICE O/P EST LOW 20 MIN: CPT | Performed by: FAMILY MEDICINE

## 2022-10-10 RX ORDER — LOSARTAN POTASSIUM 50 MG/1
50 TABLET ORAL DAILY
COMMUNITY
Start: 2022-09-20

## 2022-10-10 RX ORDER — AMLODIPINE BESYLATE 2.5 MG/1
2.5 TABLET ORAL DAILY
Qty: 30 TABLET | Refills: 5
Start: 2022-10-10

## 2022-10-10 NOTE — PROGRESS NOTES
Name: Clay Nageotte      : [id number]      MRN: [id number]  Encounter Provider: Milton Hirsch DO  Encounter Date: 10/10/2022   Encounter department: [address]     Chief Complaint   Patient presents with   • Follow-up   • Hypertension   • Hip Pain   • Back Pain       1  Essential hypertension  -     amLODIPine (NORVASC) 2 5 mg tablet; Take 1 tablet (2 5 mg total) by mouth daily    2  Anxiety           Subjective     BP in good range  CM: Amlodipine 2 5 mg, Losartan 50 mg, Toprol 25 mg  Ativan if for when get riled up  SH: Started at [employer]: 3 -4 times a week, 6 hours  Much more relaxed  Review of Systems   Constitutional: Negative  HENT: Negative  Eyes: Negative  Respiratory: Negative  Cardiovascular: Negative  Gastrointestinal: Negative  Genitourinary: Negative  Musculoskeletal: Negative  Skin: Negative  Neurological: Negative  Psychiatric/Behavioral: Negative          Past Medical History:   Diagnosis Date   • Allergic    • Anxiety    • Arthritis    • Johnston's esophagus 2020   • Bulging discs    • Cancer [facility]) Breast    R breast   • Chronic interstitial cystitis    • Colon polyp    • Diverticulosis    • Elevated amylase     Last Assessed:  3/24/15   • Fibromyalgia, primary    • GERD (gastroesophageal reflux disease)    • Hyperlipidemia    • Hypertension    • Irritable bowel syndrome    • Migraine    • Pelvic floor dysfunction    • Pneumonia    • Psychiatric disorder    • Ribs, multiple fractures     2019   • Serum lipase elevation     Last Assessed:  13   • Vitamin D deficiency      Past Surgical History:   Procedure Laterality Date   • BAND HEMORRHOIDECTOMY      internal   • BREAST LUMPECTOMY Right 10/2016   • CHOLECYSTECTOMY     • COLONOSCOPY      , "very difficult exam" per H&P   • TONSILLECTOMY     • TOOTH EXTRACTION     • TOTAL ABDOMINAL HYSTERECTOMY W/ BILATERAL SALPINGOOPHORECTOMY  2021   • TUBAL LIGATION       Family History   Problem Relation Age of Onset   • Hypertension Mother    • Heart disease Father    • Hypertension Father    • Stroke Father         Syndrome     Social History     Socioeconomic History   • Marital status: /Civil Union     Spouse name: Not on file   • Number of children: Not on file   • Years of education: Not on file   • Highest education level: Not on file   Occupational History   • Occupation: Retired   Tobacco Use   • Smoking status: Former Smoker     Packs/day: 0 50     Types: Cigarettes     Start date:      Quit date:      Years since quittin 7   • Smokeless tobacco: Never Used   Vaping Use   • Vaping Use: Never used   Substance and Sexual Activity   • Alcohol use: Never     Comment: social   • Drug use: No     Comment: CBD oil with tea at night   • Sexual activity: Not Currently   Other Topics Concern   • Not on file   Social History Narrative    Uses safety equipment     Social Determinants of Health     Financial Resource Strain: Not on file   Food Insecurity: Not on file   Transportation Needs: Not on file   Physical Activity: Not on file   Stress: Not on file   Social Connections: Not on file   Intimate Partner Violence: Not on file   Housing Stability: Not on file     Current Outpatient Medications on File Prior to Visit   Medication Sig   • acetaminophen (TYLENOL) 650 mg CR tablet Take 650 mg by mouth every 8 (eight) hours as needed   • benazepril (LOTENSIN) 20 mg tablet Take 1 tablet (20 mg total) by mouth 2 (two) times a day   • Lactobacillus Rhamnosus, GG, (Culturelle Kids) CHEW Chew 1 tablet every evening   • LORazepam (ATIVAN) 1 mg tablet Take 1 tablet (1 mg total) by mouth 2 (two) times a day as needed for anxiety   • losartan (COZAAR) 50 mg tablet Take 50 mg by mouth daily   • mesalamine (CANASA) 1,000 mg suppository Insert 1 suppository (1,000 mg total) into the rectum daily at bedtime for 180 days (Patient taking differently: Insert 1,000 mg into the rectum if needed)   • metoprolol succinate (Toprol XL) 25 mg 24 hr tablet Take 1 tablet (25 mg total) by mouth daily   • omeprazole (PriLOSEC) 20 mg delayed release capsule Take 20 mg by mouth daily TAKEN EVERY OTHER DAY    • [DISCONTINUED] amLODIPine (NORVASC) 2 5 mg tablet Take 1 tablet (2 5 mg total) by mouth 2 (two) times a day   • Diclofenac Sodium (VOLTAREN) 1 % Apply 1 application topically 4 (four) times a day (Patient not taking: Reported on 1/12/2022 )   • ondansetron (ZOFRAN-ODT) 4 mg disintegrating tablet  (Patient not taking: Reported on 4/12/2022 )   • sertraline (Zoloft) 50 mg tablet Take 1 tablet (50 mg total) by mouth daily start with 1/2 tab for 5 days then increase to one tab daily   (Patient not taking: Reported on 8/8/2022)     Allergies   Allergen Reactions   • Bactrim [Sulfamethoxazole-Trimethoprim]    • Doxycycline GI Intolerance   • Lyrica [Pregabalin]    • Pneumovax 23 [Pneumococcal Vac Polyvalent]    • Pollen Extract    • Prevnar 13 [Pneumococcal 13-Deandra Conj Vacc] Swelling   • Sertraline Chest Pain   • Trazodone Other (See Comments)     Dry mouth    • Zithromax [Azithromycin]      Immunization History   Administered Date(s) Administered   • COVID-19 PFIZER VACCINE 0 3 ML IM 02/23/2021, 03/14/2021, 10/05/2021   • COVID-19 Pfizer vac 5-11y esme-sucrose 0 2 mL IM (orange cap) 04/01/2022   • INFLUENZA 12/09/2014, 10/05/2015, 10/05/2015, 10/04/2016   • Influenza Quadrivalent 3 years and older 10/01/2020   • Influenza Split High Dose Preservative Free IM 10/04/2016   • Influenza, high dose seasonal 0 7 mL 10/11/2019   • Rabies, Intramuscular 03/17/2016, 03/20/2016   • Tdap 07/12/2021       Objective     /80 (BP Location: Left arm, Patient Position: Sitting, Cuff Size: Standard)   Pulse 76   Temp 98 5 °F (36 9 °C) (Temporal)   Ht 5' 4" (1 626 m)   Wt 53 5 kg (118 lb)   LMP  (LMP Unknown)   SpO2 94%   BMI 20 25 kg/m²     Physical Exam  Constitutional:       Appearance: She is well-developed  HENT:      Head: Normocephalic and atraumatic  Right Ear: External ear normal       Left Ear: External ear normal       Nose: Nose normal    Eyes:      Conjunctiva/sclera: Conjunctivae normal       Pupils: Pupils are equal, round, and reactive to light  Cardiovascular:      Rate and Rhythm: Normal rate and regular rhythm  Heart sounds: Normal heart sounds  Pulmonary:      Effort: Pulmonary effort is normal       Breath sounds: Normal breath sounds  Abdominal:      General: Bowel sounds are normal       Palpations: Abdomen is soft  Musculoskeletal:      Cervical back: Normal range of motion and neck supple  Skin:     General: Skin is warm and dry  Neurological:      Mental Status: She is alert and oriented to person, place, and time  Deep Tendon Reflexes: Reflexes are normal and symmetric  Psychiatric:         Behavior: Behavior normal          Thought Content:  Thought content normal          Judgment: Judgment normal        Mason Go DO

## 2023-01-03 ENCOUNTER — OFFICE VISIT (OUTPATIENT)
Dept: FAMILY MEDICINE CLINIC | Facility: CLINIC | Age: 78
End: 2023-01-03

## 2023-01-03 VITALS
HEIGHT: 64 IN | BODY MASS INDEX: 19.81 KG/M2 | WEIGHT: 116 LBS | HEART RATE: 68 BPM | RESPIRATION RATE: 18 BRPM | OXYGEN SATURATION: 98 % | SYSTOLIC BLOOD PRESSURE: 148 MMHG | TEMPERATURE: 98.2 F | DIASTOLIC BLOOD PRESSURE: 88 MMHG

## 2023-01-03 DIAGNOSIS — F41.9 ANXIETY: ICD-10-CM

## 2023-01-03 DIAGNOSIS — R09.81 HEAD CONGESTION: ICD-10-CM

## 2023-01-03 DIAGNOSIS — H04.209 SNEEZING WITH WATERY EYES: ICD-10-CM

## 2023-01-03 DIAGNOSIS — F33.0 MILD EPISODE OF RECURRENT MAJOR DEPRESSIVE DISORDER (HCC): ICD-10-CM

## 2023-01-03 DIAGNOSIS — R06.7 SNEEZING WITH WATERY EYES: ICD-10-CM

## 2023-01-03 DIAGNOSIS — B34.9 VIRAL INFECTION: Primary | ICD-10-CM

## 2023-01-03 RX ORDER — LORAZEPAM 1 MG/1
1 TABLET ORAL 2 TIMES DAILY PRN
Qty: 60 TABLET | Refills: 3 | Status: SHIPPED | OUTPATIENT
Start: 2023-01-03

## 2023-01-03 RX ORDER — ROSUVASTATIN CALCIUM 5 MG/1
5 TABLET, COATED ORAL 3 TIMES WEEKLY
COMMUNITY

## 2023-01-03 RX ORDER — METHYLPREDNISOLONE 4 MG/1
TABLET ORAL
Qty: 21 EACH | Refills: 0 | Status: SHIPPED | OUTPATIENT
Start: 2023-01-03

## 2023-01-03 RX ORDER — OSELTAMIVIR PHOSPHATE 75 MG/1
75 CAPSULE ORAL EVERY 12 HOURS SCHEDULED
Qty: 10 CAPSULE | Refills: 0 | Status: SHIPPED | OUTPATIENT
Start: 2023-01-03 | End: 2023-01-08

## 2023-01-03 NOTE — PROGRESS NOTES
Name: Kelli Robin      : [id number]      MRN: [id number]  Encounter Provider: Louis Rutledge DO  Encounter Date: 1/3/2023   Encounter department: [address]     Chief Complaint   Patient presents with   • URI     Symptoms started 3 days ago, did home covid testing 3 times and all are negative  • Headache     1  Viral infection  -     oseltamivir (TAMIFLU) 75 mg capsule; Take 1 capsule (75 mg total) by mouth every 12 (twelve) hours for 5 days    2  Head congestion  -     methylPREDNISolone 4 MG tablet therapy pack; Use as directed on package    3  Anxiety  -     LORazepam (ATIVAN) 1 mg tablet; Take 1 tablet (1 mg total) by mouth 2 (two) times a day as needed for anxiety    4  Mild episode of recurrent major depressive disorder ([address] )    5  Sneezing with watery eyes  -     methylPREDNISolone 4 MG tablet therapy pack; Use as directed on package           Subjective     Head congestion, PND, coughing  Started Saturday  Depression, anxiety, BP controlled  Gets periods of anxiety  SH: Neg tob or [place]  Refill Med's  Review of Systems   Constitutional: Positive for fatigue  HENT: Positive for congestion and sneezing  Eyes: Negative  Respiratory: Positive for cough  Cardiovascular: Negative  Gastrointestinal: Negative  Genitourinary: Negative  Musculoskeletal: Negative  Skin: Negative  Neurological: Negative  Psychiatric/Behavioral: Negative          Past Medical History:   Diagnosis Date   • Allergic    • Anxiety    • Arthritis    • Johnston's esophagus 2020   • Bulging discs    • Cancer [facility]) Breast    R breast   • Chronic interstitial cystitis    • Colon polyp    • Diverticulosis    • Elevated amylase     Last Assessed:  3/24/15   • Fibromyalgia, primary    • GERD (gastroesophageal reflux disease)    • Hyperlipidemia    • Hypertension    • Irritable bowel syndrome    • Migraine    • Pelvic floor dysfunction    • Pneumonia    • Psychiatric disorder    • Ribs, multiple fractures     2019   • Serum lipase elevation     Last Assessed:  13   • Vitamin D deficiency      Past Surgical History:   Procedure Laterality Date   • BAND HEMORRHOIDECTOMY      internal   • BREAST LUMPECTOMY Right 10/2016   • CHOLECYSTECTOMY     • COLONOSCOPY      , "very difficult exam" per H&P   • TONSILLECTOMY     • TOOTH EXTRACTION     • TOTAL ABDOMINAL HYSTERECTOMY W/ BILATERAL SALPINGOOPHORECTOMY  2021   • TUBAL LIGATION       Family History   Problem Relation Age of Onset   • Hypertension Mother    • Heart disease Father    • Hypertension Father    • Stroke Father         Syndrome     Social History     Socioeconomic History   • Marital status: /Civil Union     Spouse name: None   • Number of children: None   • Years of education: None   • Highest education level: None   Occupational History   • Occupation: Retired   Tobacco Use   • Smoking status: Former     Packs/day: 0 50     Types: Cigarettes     Start date:      Quit date:      Years since quittin 0   • Smokeless tobacco: Never   Vaping Use   • Vaping Use: Never used   Substance and Sexual Activity   • Alcohol use: Never     Comment: social   • Drug use: No     Comment: CBD oil with tea at night   • Sexual activity: Not Currently   Other Topics Concern   • None   Social History Narrative    Uses safety equipment     Social Determinants of Health     Financial Resource Strain: Not on file   Food Insecurity: Not on file   Transportation Needs: Not on file   Physical Activity: Not on file   Stress: Not on file   Social Connections: Not on file   Intimate Partner Violence: Not on file   Housing Stability: Not on file     Current Outpatient Medications on File Prior to Visit   Medication Sig   • acetaminophen (TYLENOL) 650 mg CR tablet Take 650 mg by mouth every 8 (eight) hours as needed   • amLODIPine (NORVASC) 2 5 mg tablet Take 1 tablet (2 5 mg total) by mouth daily   • benazepril (LOTENSIN) 20 mg tablet Take 1 tablet (20 mg total) by mouth 2 (two) times a day   • Lactobacillus Rhamnosus, GG, (Culturelle Kids) CHEW Chew 1 tablet every evening   • losartan (COZAAR) 50 mg tablet Take 50 mg by mouth daily   • metoprolol succinate (Toprol XL) 25 mg 24 hr tablet Take 1 tablet (25 mg total) by mouth daily   • omeprazole (PriLOSEC) 20 mg delayed release capsule Take 20 mg by mouth daily TAKEN EVERY OTHER DAY    • rosuvastatin (CRESTOR) 5 mg tablet Take 5 mg by mouth 3 (three) times a week   • [DISCONTINUED] LORazepam (ATIVAN) 1 mg tablet Take 1 tablet (1 mg total) by mouth 2 (two) times a day as needed for anxiety   • [DISCONTINUED] sertraline (Zoloft) 50 mg tablet Take 1 tablet (50 mg total) by mouth daily start with 1/2 tab for 5 days then increase to one tab daily     • [DISCONTINUED] Diclofenac Sodium (VOLTAREN) 1 % Apply 1 application topically 4 (four) times a day   • [DISCONTINUED] mesalamine (CANASA) 1,000 mg suppository Insert 1 suppository (1,000 mg total) into the rectum daily at bedtime for 180 days (Patient taking differently: Insert 1,000 mg into the rectum if needed)   • [DISCONTINUED] ondansetron (ZOFRAN-ODT) 4 mg disintegrating tablet      Allergies   Allergen Reactions   • Bactrim [Sulfamethoxazole-Trimethoprim]    • Doxycycline GI Intolerance   • Lyrica [Pregabalin]    • Pneumovax 23 [Pneumococcal Vac Polyvalent]    • Pollen Extract    • Prevnar 13 [Pneumococcal 13-Deandra Conj Vacc] Swelling   • Sertraline Chest Pain   • Trazodone Other (See Comments)     Dry mouth    • Zithromax [Azithromycin]      Immunization History   Administered Date(s) Administered   • COVID-19 PFIZER VACCINE 0 3 ML IM 02/23/2021, 03/14/2021, 10/05/2021   • COVID-19 Pfizer vac 5-11y esme-sucrose 0 2 mL IM (orange cap) 04/01/2022   • INFLUENZA 12/09/2014, 10/05/2015, 10/05/2015, 10/04/2016   • Influenza Quadrivalent 3 years and older 10/01/2020   • Influenza Split High Dose Preservative Free IM 10/04/2016   • Influenza, high dose seasonal 0 7 mL 10/11/2019   • Rabies, Intramuscular 03/17/2016, 03/20/2016   • Tdap 07/12/2021       Objective     /88 (BP Location: Left arm, Patient Position: Sitting, Cuff Size: Standard)   Pulse 68   Temp 98 2 °F (36 8 °C) (Temporal)   Resp 18   Ht 5' 4" (1 626 m)   Wt 52 6 kg (116 lb)   LMP  (LMP Unknown)   SpO2 98%   BMI 19 91 kg/m²     Physical Exam  Constitutional:       Appearance: She is well-developed  HENT:      Head: Normocephalic and atraumatic  Right Ear: Tympanic membrane, ear canal and external ear normal       Left Ear: Tympanic membrane, ear canal and external ear normal       Nose: Nose normal       Mouth/Throat:      Mouth: Mucous membranes are moist       Pharynx: Oropharynx is clear  Eyes:      Conjunctiva/sclera: Conjunctivae normal       Pupils: Pupils are equal, round, and reactive to light  Cardiovascular:      Rate and Rhythm: Normal rate and regular rhythm  Heart sounds: Normal heart sounds  Pulmonary:      Effort: Pulmonary effort is normal       Breath sounds: Normal breath sounds  Musculoskeletal:      Cervical back: Normal range of motion and neck supple  Skin:     General: Skin is warm and dry  Neurological:      Mental Status: She is alert and oriented to person, place, and time  Deep Tendon Reflexes: Reflexes are normal and symmetric  Psychiatric:         Mood and Affect: Mood normal          Behavior: Behavior normal          Thought Content:  Thought content normal          Judgment: Judgment normal        Trevonord Altes, DO

## 2023-01-20 RX ORDER — SODIUM CHLORIDE 9 MG/ML
125 INJECTION, SOLUTION INTRAVENOUS CONTINUOUS
Status: CANCELLED | OUTPATIENT
Start: 2023-01-20

## 2023-01-23 ENCOUNTER — ANESTHESIA (OUTPATIENT)
Dept: GASTROENTEROLOGY | Facility: HOSPITAL | Age: 78
End: 2023-01-23

## 2023-01-23 ENCOUNTER — ANESTHESIA EVENT (OUTPATIENT)
Dept: GASTROENTEROLOGY | Facility: HOSPITAL | Age: 78
End: 2023-01-23

## 2023-01-23 ENCOUNTER — HOSPITAL ENCOUNTER (OUTPATIENT)
Dept: GASTROENTEROLOGY | Facility: HOSPITAL | Age: 78
Setting detail: OUTPATIENT SURGERY
Discharge: HOME | End: 2023-01-23
Attending: COLON & RECTAL SURGERY | Admitting: COLON & RECTAL SURGERY

## 2023-01-23 VITALS
WEIGHT: 115 LBS | BODY MASS INDEX: 19.63 KG/M2 | SYSTOLIC BLOOD PRESSURE: 121 MMHG | HEIGHT: 64 IN | TEMPERATURE: 99 F | HEART RATE: 67 BPM | RESPIRATION RATE: 15 BRPM | DIASTOLIC BLOOD PRESSURE: 69 MMHG | OXYGEN SATURATION: 99 %

## 2023-01-23 DIAGNOSIS — Z83.71 FAMILY HISTORY OF COLONIC POLYPS: ICD-10-CM

## 2023-01-23 DIAGNOSIS — Z86.010 PERSONAL HISTORY OF COLONIC POLYPS: ICD-10-CM

## 2023-01-23 DIAGNOSIS — Z12.11 ENCOUNTER FOR SCREENING FOR MALIGNANT NEOPLASM OF COLON: ICD-10-CM

## 2023-01-23 RX ORDER — PROPOFOL 10 MG/ML
INJECTION, EMULSION INTRAVENOUS CONTINUOUS PRN
Status: DISCONTINUED | OUTPATIENT
Start: 2023-01-23 | End: 2023-01-23

## 2023-01-23 RX ORDER — PROPOFOL 10 MG/ML
INJECTION, EMULSION INTRAVENOUS AS NEEDED
Status: DISCONTINUED | OUTPATIENT
Start: 2023-01-23 | End: 2023-01-23

## 2023-01-23 RX ORDER — SODIUM CHLORIDE 9 MG/ML
INJECTION, SOLUTION INTRAVENOUS CONTINUOUS PRN
Status: DISCONTINUED | OUTPATIENT
Start: 2023-01-23 | End: 2023-01-23

## 2023-01-23 RX ORDER — SODIUM CHLORIDE 9 MG/ML
125 INJECTION, SOLUTION INTRAVENOUS CONTINUOUS
Status: DISCONTINUED | OUTPATIENT
Start: 2023-01-23 | End: 2023-01-27 | Stop reason: HOSPADM

## 2023-01-23 RX ORDER — LIDOCAINE HYDROCHLORIDE 20 MG/ML
INJECTION, SOLUTION EPIDURAL; INFILTRATION; INTRACAUDAL; PERINEURAL AS NEEDED
Status: DISCONTINUED | OUTPATIENT
Start: 2023-01-23 | End: 2023-01-23

## 2023-01-23 RX ADMIN — PROPOFOL 30 MG: 10 INJECTION, EMULSION INTRAVENOUS at 09:44

## 2023-01-23 RX ADMIN — SODIUM CHLORIDE: 0.9 INJECTION, SOLUTION INTRAVENOUS at 09:44

## 2023-01-23 RX ADMIN — PROPOFOL 75 MCG/KG/MIN: 10 INJECTION, EMULSION INTRAVENOUS at 09:44

## 2023-01-23 RX ADMIN — PROPOFOL 40 MG: 10 INJECTION, EMULSION INTRAVENOUS at 09:50

## 2023-01-23 RX ADMIN — PROPOFOL 30 MG: 10 INJECTION, EMULSION INTRAVENOUS at 09:45

## 2023-01-23 RX ADMIN — PROPOFOL 20 MG: 10 INJECTION, EMULSION INTRAVENOUS at 09:48

## 2023-01-23 RX ADMIN — LIDOCAINE HYDROCHLORIDE 100 MG: 20 INJECTION, SOLUTION EPIDURAL; INFILTRATION; INTRACAUDAL; PERINEURAL at 09:44

## 2023-01-23 RX ADMIN — SODIUM CHLORIDE 125 ML/HR: 0.9 INJECTION, SOLUTION INTRAVENOUS at 09:20

## 2023-01-23 NOTE — DISCHARGE INSTRUCTIONS
[facility] [address], [address] [place] [address] [address]  Phone: [phone]    DISCHARGE INSTRUCTIONS:    1   ___ Complete Exam - Normal    2   ___ Exam normal, but entire colon not seen  We will discuss this with you  3   ___ Polyp(s) removed by "burning" - NO pathology report will follow    4   __1_ Polyp(s) removed by excision  Pathology report will be available in 4-5 days   Someone from our office will call you with results  5   ___ Exam prompted biopsies  Pathology report will be available in 4-5 days   Someone from our office will call you with results  6   ___ Exam demonstrated findings that need treatment  Prescriptions will be   Given to you  Return to our office in ____ weeks  Please call for appt  7   ___ Original office visit or colonoscopy findings necessitate an office visit  Please call to set up a new appointment    8   ___ Medication  __________________________________________        [address]:    - Go straight home and rest today    - No driving or drinking alcohol for 24 hours    - Resume regular diet and medications unless otherwise instructed  Coumadin and Plavix are blood thinners  You can resume these medications on __________      IF YOU ARE HAVING ANY FEVER, BLEEDING OR PERSISTENT PAIN IN THE ABDOMEN, PLEASE CALL OUR OFFICE ANY DAY OR TIME  [phone]

## 2023-01-23 NOTE — ANESTHESIA PREPROCEDURE EVALUATION
Procedure:  COLONOSCOPY    Relevant Problems   CARDIO   (+) Essential hypertension   (+) Hyperlipidemia      GYN   (+) Breast cancer, stage 1, right (HCC)      MUSCULOSKELETAL   (+) Fibromyalgia, primary      NEURO/PSYCH   (+) Current mild episode of major depressive disorder without prior episode (HCC)   (+) Fibromyalgia, primary   (+) Panic attacks      Digestive   (+) Johnston's esophagus with dysplasia        Physical Exam    Airway    Mallampati score: II  TM Distance: >3 FB  Neck ROM: full     Dental   upper dentures,     Cardiovascular  Rhythm: regular, Rate: normal, Cardiovascular exam normal    Pulmonary  Pulmonary exam normal     Other Findings        Anesthesia Plan  ASA Score- 2     Anesthesia Type- IV sedation with anesthesia with ASA Monitors  Additional Monitors:   Airway Plan:     Comment: GA prn  Plan Factors-    Chart reviewed  Patient summary reviewed  Patient is not a current smoker  Patient not instructed to abstain from smoking on day of procedure  Patient did not smoke on day of surgery  There is medical exclusion for perioperative obstructive sleep apnea risk education  Induction- intravenous  Postoperative Plan-     Informed Consent- Anesthetic plan and risks discussed with patient and spouse Larry ruiz))

## 2023-01-23 NOTE — INTERVAL H&P NOTE
H&P reviewed  After examining the patient I find no changes in the patients condition since the H&P had been written      Vitals:    01/23/23 0859   BP: 131/84   Pulse: 69   Resp: 16   Temp: 99 °F (37 2 °C)   SpO2: 95%

## 2023-01-24 NOTE — ANESTHESIA POSTPROCEDURE EVALUATION
Post-Op Assessment Note    CV Status:  Stable    Pain management: adequate     Mental Status:  Alert and awake   Hydration Status:  Euvolemic   PONV Controlled:  Controlled   Airway Patency:  Patent      Post Op Vitals Reviewed: Yes      Staff: Anesthesiologist, CRNA         No notable events documented      BP      Temp      Pulse     Resp      SpO2      /69   Pulse 67   Temp 99 °F (37 2 °C) (Temporal)   Resp 15   Ht 5' 4" (1 626 m)   Wt 52 2 kg (115 lb)   LMP  (LMP Unknown)   SpO2 99%   BMI 19 74 kg/m²

## 2023-04-04 ENCOUNTER — RA CDI HCC (OUTPATIENT)
Dept: OTHER | Facility: HOSPITAL | Age: 78
End: 2023-04-04

## 2023-04-04 NOTE — PROGRESS NOTES
[facility]  coding opportunities       Chart reviewed, no opportunity found:   [address]        Patients Insurance     Medicare Insurance: [employer] Advantage

## 2023-05-22 ENCOUNTER — OFFICE VISIT (OUTPATIENT)
Dept: FAMILY MEDICINE CLINIC | Facility: CLINIC | Age: 78
End: 2023-05-22

## 2023-05-22 VITALS
WEIGHT: 121.2 LBS | SYSTOLIC BLOOD PRESSURE: 132 MMHG | DIASTOLIC BLOOD PRESSURE: 82 MMHG | OXYGEN SATURATION: 100 % | RESPIRATION RATE: 18 BRPM | BODY MASS INDEX: 20.69 KG/M2 | HEIGHT: 64 IN | HEART RATE: 70 BPM | TEMPERATURE: 98.2 F

## 2023-05-22 DIAGNOSIS — F41.9 ANXIETY: ICD-10-CM

## 2023-05-22 DIAGNOSIS — F32.0 CURRENT MILD EPISODE OF MAJOR DEPRESSIVE DISORDER, UNSPECIFIED WHETHER RECURRENT (HCC): ICD-10-CM

## 2023-05-22 RX ORDER — LORAZEPAM 1 MG/1
1 TABLET ORAL 2 TIMES DAILY PRN
Qty: 60 TABLET | Refills: 3 | Status: SHIPPED | OUTPATIENT
Start: 2023-05-22

## 2023-05-22 NOTE — PROGRESS NOTES
"Name: Vista Goodpasture      : [id number]      MRN: [id number]  Encounter Provider: Nadine Verduzco DO  Encounter Date: 2023   Encounter department: [address]     Chief Complaint   Patient presents with   • Hypertension     Follow up     1  Current mild episode of major depressive disorder, unspecified whether recurrent ([facility] )    2  Anxiety  -     LORazepam (ATIVAN) 1 mg tablet; Take 1 tablet (1 mg total) by mouth 2 (two) times a day as needed for anxiety           Subjective     Felling better, less depressed - stopped stressing about everything  Continues working 20 hrs a week - 3 days  Review of Systems   Constitutional: Negative  HENT: Negative  Eyes: Negative  Respiratory: Negative  Cardiovascular: Negative  Gastrointestinal: Negative  Genitourinary: Negative  Musculoskeletal: Negative  Skin: Negative  Neurological: Negative  Psychiatric/Behavioral: Negative          Past Medical History:   Diagnosis Date   • Allergic    • Anxiety    • Arthritis    • Johnston's esophagus 2020   • Bulging discs    • Cancer [facility]) Breast    R breast   • Chronic interstitial cystitis    • Colon polyp    • Diverticulosis    • Elevated amylase     Last Assessed:  3/24/15   • Fibromyalgia, primary    • GERD (gastroesophageal reflux disease)    • Hyperlipidemia    • Hypertension    • Irritable bowel syndrome    • Migraine    • Pelvic floor dysfunction    • Pneumonia    • Psychiatric disorder    • Ribs, multiple fractures     2019   • Serum lipase elevation     Last Assessed:  13   • Vitamin D deficiency      Past Surgical History:   Procedure Laterality Date   • BAND HEMORRHOIDECTOMY      internal   • BREAST LUMPECTOMY Right 10/2016   • CHOLECYSTECTOMY     • COLONOSCOPY      , \"very difficult exam\" per H&P   • TONSILLECTOMY     • TOOTH EXTRACTION     • TOTAL ABDOMINAL HYSTERECTOMY W/ BILATERAL SALPINGOOPHORECTOMY  2021   • TUBAL LIGATION   " Family History   Problem Relation Age of Onset   • Hypertension Mother    • Heart disease Father    • Hypertension Father    • Stroke Father         Syndrome     Social History     Socioeconomic History   • Marital status: /Civil Union     Spouse name: None   • Number of children: None   • Years of education: None   • Highest education level: None   Occupational History   • Occupation: Retired   Tobacco Use   • Smoking status: Former     Packs/day: 0 50     Types: Cigarettes     Start date:      Quit date:      Years since quittin 4   • Smokeless tobacco: Never   Vaping Use   • Vaping Use: Never used   Substance and Sexual Activity   • Alcohol use: Never     Comment: social   • Drug use: No     Comment: CBD oil with tea at night   • Sexual activity: Not Currently   Other Topics Concern   • None   Social History Narrative    Uses safety equipment     Social Determinants of Health     Financial Resource Strain: Not on file   Food Insecurity: Not on file   Transportation Needs: Not on file   Physical Activity: Not on file   Stress: Not on file   Social Connections: Not on file   Intimate Partner Violence: Not on file   Housing Stability: Not on file     Current Outpatient Medications on File Prior to Visit   Medication Sig   • acetaminophen (TYLENOL) 650 mg CR tablet Take 650 mg by mouth every 8 (eight) hours as needed   • amLODIPine (NORVASC) 2 5 mg tablet Take 1 tablet (2 5 mg total) by mouth daily   • benazepril (LOTENSIN) 20 mg tablet Take 1 tablet (20 mg total) by mouth 2 (two) times a day   • citalopram (CeleXA) 10 mg tablet Take 1 tablet (10 mg total) by mouth daily   • Lactobacillus Rhamnosus, GG, (Culturelle Kids) CHEW Chew 1 tablet every evening   • metoprolol succinate (Toprol XL) 25 mg 24 hr tablet Take 1 tablet (25 mg total) by mouth daily   • omeprazole (PriLOSEC) 20 mg delayed release capsule Take 20 mg by mouth daily TAKEN EVERY OTHER DAY    • rosuvastatin (CRESTOR) 5 mg tablet "Take 5 mg by mouth 3 (three) times a week   • [DISCONTINUED] LORazepam (ATIVAN) 1 mg tablet Take 1 tablet (1 mg total) by mouth 2 (two) times a day as needed for anxiety   • methylPREDNISolone 4 MG tablet therapy pack Use as directed on package     Allergies   Allergen Reactions   • Bactrim [Sulfamethoxazole-Trimethoprim]    • Doxycycline GI Intolerance   • Lyrica [Pregabalin]    • Pneumovax 23 [Pneumococcal Vac Polyvalent]    • Pollen Extract    • Prevnar 13 [Pneumococcal 13-Deandra Conj Vacc] Swelling   • Sertraline Chest Pain   • Trazodone Other (See Comments)     Dry mouth    • Zithromax [Azithromycin]      Immunization History   Administered Date(s) Administered   • COVID-19 PFIZER VACCINE 0 3 ML IM 02/23/2021, 03/14/2021, 10/05/2021, 10/04/2022   • COVID-19 Pfizer vac 5-11y esme-sucrose 0 2 mL IM (orange cap) 04/01/2022   • INFLUENZA 12/09/2014, 10/05/2015, 10/05/2015, 10/04/2016   • Influenza Quadrivalent 3 years and older 10/01/2020   • Influenza Split High Dose Preservative Free IM 10/04/2016   • Influenza, high dose seasonal 0 7 mL 10/11/2019   • Rabies, Intramuscular 03/17/2016, 03/20/2016   • Tdap 07/12/2021       Objective     /82 (BP Location: Left arm, Patient Position: Sitting, Cuff Size: Standard)   Pulse 70   Temp 98 2 °F (36 8 °C) (Temporal)   Resp 18   Ht 5' 4\" (1 626 m)   Wt 55 kg (121 lb 3 2 oz)   LMP  (LMP Unknown)   SpO2 100%   BMI 20 80 kg/m²     Physical Exam  Constitutional:       Appearance: She is well-developed  HENT:      Head: Normocephalic and atraumatic  Right Ear: External ear normal       Left Ear: External ear normal       Nose: Nose normal       Mouth/Throat:      Mouth: Mucous membranes are moist       Pharynx: Oropharynx is clear  Eyes:      Conjunctiva/sclera: Conjunctivae normal       Pupils: Pupils are equal, round, and reactive to light  Cardiovascular:      Rate and Rhythm: Normal rate and regular rhythm  Heart sounds: Normal heart sounds   " Pulmonary:      Effort: Pulmonary effort is normal       Breath sounds: Normal breath sounds  Musculoskeletal:      Cervical back: Normal range of motion and neck supple  Skin:     General: Skin is warm and dry  Neurological:      Mental Status: She is alert and oriented to person, place, and time  Deep Tendon Reflexes: Reflexes are normal and symmetric  Psychiatric:         Mood and Affect: Mood normal          Behavior: Behavior normal          Thought Content:  Thought content normal          Judgment: Judgment normal        Ysabel Eller, DO

## 2023-06-12 ENCOUNTER — TELEPHONE (OUTPATIENT)
Dept: FAMILY MEDICINE CLINIC | Facility: CLINIC | Age: 78
End: 2023-06-12

## 2023-06-12 NOTE — TELEPHONE ENCOUNTER
Patient called - positive for covid today 6/12/23, symptoms started 6/10/23  Given advised  For hydration, apap if needed  Stay at home for 5 days then can go out on the 6th day but need to wear mask for additional 5 more days

## 2023-09-08 ENCOUNTER — OFFICE VISIT (OUTPATIENT)
Dept: URGENT CARE | Age: 78
End: 2023-09-08
Payer: COMMERCIAL

## 2023-09-08 VITALS
BODY MASS INDEX: 21 KG/M2 | HEIGHT: 64 IN | RESPIRATION RATE: 16 BRPM | WEIGHT: 123 LBS | OXYGEN SATURATION: 96 % | HEART RATE: 66 BPM | TEMPERATURE: 97.9 F

## 2023-09-08 DIAGNOSIS — R39.9 UTI SYMPTOMS: Primary | ICD-10-CM

## 2023-09-08 PROCEDURE — 99213 OFFICE O/P EST LOW 20 MIN: CPT | Performed by: PHYSICIAN ASSISTANT

## 2023-09-08 PROCEDURE — 87077 CULTURE AEROBIC IDENTIFY: CPT | Performed by: PHYSICIAN ASSISTANT

## 2023-09-08 PROCEDURE — 87186 SC STD MICRODIL/AGAR DIL: CPT | Performed by: PHYSICIAN ASSISTANT

## 2023-09-08 PROCEDURE — S9083 URGENT CARE CENTER GLOBAL: HCPCS | Performed by: PHYSICIAN ASSISTANT

## 2023-09-08 PROCEDURE — 87086 URINE CULTURE/COLONY COUNT: CPT | Performed by: PHYSICIAN ASSISTANT

## 2023-09-08 RX ORDER — GRANULES FOR ORAL 3 G/1
3 POWDER ORAL ONCE
Qty: 3 G | Refills: 0 | Status: SHIPPED | OUTPATIENT
Start: 2023-09-08 | End: 2023-09-08

## 2023-09-08 RX ORDER — FAMOTIDINE 20 MG/1
TABLET, FILM COATED ORAL
COMMUNITY
Start: 2023-08-31

## 2023-09-08 NOTE — PATIENT INSTRUCTIONS
Urinary Tract Infection in Women   WHAT YOU NEED TO KNOW:   A urinary tract infection (UTI) is caused by bacteria that get inside your urinary tract. Your urinary tract includes your kidneys, ureters, bladder, and urethra. A UTI is more common in your lower urinary tract, which includes your bladder and urethra. DISCHARGE INSTRUCTIONS:   Return to the emergency department if:   You are urinating very little or not at all. You have a high fever with shaking chills. You have side or back pain that gets worse. Call your doctor if:   You have a fever. You do not feel better after 2 days of taking antibiotics. You have new symptoms, such as blood or pus in your urine. You are vomiting. You have questions or concerns about your condition or care. Medicines:   Antibiotics  treat a bacterial infection. If you have UTIs often (called recurrent UTIs), you may be given antibiotics to take regularly. You will be given directions for when and how to use antibiotics. The goal is to prevent UTIs but not cause antibiotic resistance by using antibiotics too often. Medicines  may be given to decrease pain and burning when you urinate. They will also help decrease the feeling that you need to urinate often. These medicines may make your urine orange or red. Take your medicine as directed. Contact your healthcare provider if you think your medicine is not helping or if you have side effects. Tell your provider if you are allergic to any medicine. Keep a list of the medicines, vitamins, and herbs you take. Include the amounts, and when and why you take them. Bring the list or the pill bottles to follow-up visits. Carry your medicine list with you in case of an emergency. Follow up with your doctor as directed:  Write down your questions so you remember to ask them during your visits. Prevent another UTI:   Empty your bladder often. Urinate and empty your bladder as soon as you feel the need.  Do not hold your urine for long periods of time. Wipe from front to back after you urinate or have a bowel movement. This will help prevent germs from getting into your urinary tract through your urethra. Drink liquids as directed. Ask how much liquid to drink each day and which liquids are best for you. You may need to drink more liquids than usual to help flush out the bacteria. Do not drink alcohol, caffeine, or citrus juices. These can irritate your bladder and increase your symptoms. Your healthcare provider may recommend cranberry juice to help prevent a UTI. Urinate before and after you have sex. This can help flush out bacteria passed during sex. Do not douche or use feminine deodorants. These can change the chemical balance in your vagina. Change sanitary pads or tampons often. This will help prevent germs from getting into your urinary tract. Talk to your healthcare provider about your birth control method. You may need to change your method if it is increasing your risk for UTIs. Wear cotton underwear and clothes that are loose. Tight pants and nylon underwear can trap moisture and cause bacteria to grow. Vaginal estrogen may be recommended. This medicine helps prevent UTIs in women who have gone through menopause or are in dwain-menopause. Do pelvic muscle exercises often. Pelvic muscle exercises may help you start and stop urinating. Strong pelvic muscles may help you empty your bladder easier. Squeeze these muscles tightly for 5 seconds like you are trying to hold back urine. Then relax for 5 seconds. Gradually work up to squeezing for 10 seconds. Do 3 sets of 15 repetitions a day, or as directed. © Copyright [facility] 2022 Information is for End User's use only and may not be sold, redistributed or otherwise used for commercial purposes. The above information is an  only. It is not intended as medical advice for individual conditions or treatments. Talk to your doctor, nurse or pharmacist before following any medical regimen to see if it is safe and effective for you.

## 2023-09-08 NOTE — PROGRESS NOTES
[facility]        NAME: Link Woods is a 69 y.o. female  : 1945    MRN: [id number]  DATE: 2023  TIME: 11:00 AM    Assessment and Plan   No primary diagnosis found. No diagnosis found. Patient Instructions       Follow up with PCP in 3-5 days. Proceed to  ER if symptoms worsen. Chief Complaint     Chief Complaint   Patient presents with   • Possible UTI     For approximately four days patient has been having a burning while urinating and states she is also constipated and having chills. She has been using Urical[employer].            History of Present Illness       HPI    Review of Systems   Review of Systems      Current Medications       Current Outpatient Medications:   •  acetaminophen (TYLENOL) 650 mg CR tablet, Take 650 mg by mouth every 8 (eight) hours as needed, Disp: , Rfl:   •  amLODIPine (NORVASC) 2.5 mg tablet, Take 1 tablet (2.5 mg total) by mouth daily, Disp: 90 tablet, Rfl: 1  •  benazepril (LOTENSIN) 20 mg tablet, Take 1 tablet (20 mg total) by mouth 2 (two) times a day, Disp: 180 tablet, Rfl: 1  •  famotidine (PEPCID) 20 mg tablet, , Disp: , Rfl:   •  Lactobacillus Rhamnosus, GG, (Culturelle Kids) CHEW, Chew 1 tablet every evening, Disp: , Rfl:   •  LORazepam (ATIVAN) 1 mg tablet, Take 1 tablet (1 mg total) by mouth 2 (two) times a day as needed for anxiety, Disp: 60 tablet, Rfl: 3  •  metoprolol succinate (Toprol XL) 25 mg 24 hr tablet, Take 1 tablet (25 mg total) by mouth daily, Disp: 90 tablet, Rfl: 1  •  omeprazole (PriLOSEC) 20 mg delayed release capsule, Take 20 mg by mouth daily TAKEN EVERY OTHER DAY , Disp: , Rfl:   •  rosuvastatin (CRESTOR) 5 mg tablet, Take 5 mg by mouth 3 (three) times a week, Disp: , Rfl:   •  citalopram (CeleXA) 10 mg tablet, Take 1 tablet (10 mg total) by mouth daily (Patient not taking: Reported on 2023), Disp: 30 tablet, Rfl: 5  •  methylPREDNISolone 4 MG tablet therapy pack, Use as directed on package (Patient not taking: Reported on 9/8/2023), Disp: 21 each, Rfl: 0    Current Allergies     Allergies as of 09/08/2023 - Reviewed 09/08/2023   Allergen Reaction Noted   • Amoxicillin Other (See Comments) 09/08/2023   • Bactrim [sulfamethoxazole-trimethoprim]  03/17/2016   • Doxycycline GI Intolerance 08/11/2021   • Lyrica [pregabalin]  03/17/2016   • Pneumovax 23 [pneumococcal vac polyvalent]  03/17/2016   • Pollen extract  12/11/2012   • Prevnar 13 [pneumococcal 13-nazia conj vacc] Swelling 02/11/2020   • Sertraline Chest Pain 11/30/2021   • Trazodone Other (See Comments) 10/19/2020   • Zithromax [azithromycin]  03/17/2016            The following portions of the patient's history were reviewed and updated as appropriate: allergies, current medications, past family history, past medical history, past social history, past surgical history and problem list.     Past Medical History:   Diagnosis Date   • Allergic    • Anxiety    • Arthritis    • Johnston's esophagus 09/2020   • Bulging discs    • Cancer ([address]) Breast    R breast   • Chronic interstitial cystitis    • Colon polyp    • Diverticulosis    • Elevated amylase     Last Assessed:  3/24/15   • Fibromyalgia, primary    • GERD (gastroesophageal reflux disease)    • Hyperlipidemia    • Hypertension    • Irritable bowel syndrome    • Migraine    • Pelvic floor dysfunction    • Pneumonia    • Psychiatric disorder    • Ribs, multiple fractures     2/2019   • Serum lipase elevation     Last Assessed:  9/17/13   • Vitamin D deficiency        Past Surgical History:   Procedure Laterality Date   • BAND HEMORRHOIDECTOMY      internal   • BREAST LUMPECTOMY Right 10/2016   • CHOLECYSTECTOMY     • COLONOSCOPY      2016, "very difficult exam" per H&P   • TONSILLECTOMY     • TOOTH EXTRACTION     • TOTAL ABDOMINAL HYSTERECTOMY W/ BILATERAL SALPINGOOPHORECTOMY  08/04/2021   • TUBAL LIGATION         Family History   Problem Relation Age of Onset   • Hypertension Mother    • Heart disease Father    • Hypertension Father    • Stroke Father         Syndrome         Medications have been verified. Objective   Pulse 66   Temp 97.9 °F (36.6 °C)   Resp 16   Ht 5' 4" (1.626 m)   Wt 55.8 kg (123 lb)   LMP  (LMP Unknown)   SpO2 96%   BMI 21.11 kg/m²   No LMP recorded (lmp unknown).  Patient is postmenopausal.       Physical Exam     Physical Exam

## 2023-09-10 LAB — BACTERIA UR CULT: ABNORMAL

## 2023-09-20 RX ORDER — BENAZEPRIL HYDROCHLORIDE 40 MG/1
1 TABLET, FILM COATED ORAL DAILY
COMMUNITY
Start: 2023-06-26

## 2023-09-20 RX ORDER — AMLODIPINE BESYLATE 5 MG/1
1 TABLET ORAL DAILY
COMMUNITY
Start: 2023-08-23

## 2023-09-20 RX ORDER — TIZANIDINE 2 MG/1
1 TABLET ORAL DAILY
COMMUNITY
Start: 2023-08-02 | End: 2023-09-25

## 2023-09-20 RX ORDER — MELOXICAM 7.5 MG/1
7.5 TABLET ORAL DAILY
COMMUNITY
Start: 2023-08-23 | End: 2023-09-25

## 2023-09-25 ENCOUNTER — OFFICE VISIT (OUTPATIENT)
Dept: FAMILY MEDICINE CLINIC | Facility: CLINIC | Age: 78
End: 2023-09-25
Payer: COMMERCIAL

## 2023-09-25 VITALS
OXYGEN SATURATION: 100 % | BODY MASS INDEX: 21.17 KG/M2 | HEART RATE: 60 BPM | TEMPERATURE: 98.4 F | DIASTOLIC BLOOD PRESSURE: 68 MMHG | SYSTOLIC BLOOD PRESSURE: 126 MMHG | WEIGHT: 124 LBS | RESPIRATION RATE: 18 BRPM | HEIGHT: 64 IN

## 2023-09-25 DIAGNOSIS — E78.2 MIXED HYPERLIPIDEMIA: ICD-10-CM

## 2023-09-25 DIAGNOSIS — I10 ESSENTIAL HYPERTENSION: ICD-10-CM

## 2023-09-25 DIAGNOSIS — F43.9 STRESS AT HOME: ICD-10-CM

## 2023-09-25 DIAGNOSIS — C50.911 BREAST CANCER, STAGE 1, RIGHT (HCC): ICD-10-CM

## 2023-09-25 DIAGNOSIS — I10 PRIMARY HYPERTENSION: ICD-10-CM

## 2023-09-25 DIAGNOSIS — F41.9 ANXIETY: ICD-10-CM

## 2023-09-25 DIAGNOSIS — Z00.00 MEDICARE ANNUAL WELLNESS VISIT, INITIAL: Primary | ICD-10-CM

## 2023-09-25 PROCEDURE — 99214 OFFICE O/P EST MOD 30 MIN: CPT | Performed by: FAMILY MEDICINE

## 2023-09-25 PROCEDURE — G0439 PPPS, SUBSEQ VISIT: HCPCS | Performed by: FAMILY MEDICINE

## 2023-09-25 RX ORDER — METOPROLOL SUCCINATE 25 MG/1
25 TABLET, EXTENDED RELEASE ORAL DAILY
Qty: 90 TABLET | Refills: 1 | Status: SHIPPED | OUTPATIENT
Start: 2023-09-25

## 2023-09-25 RX ORDER — LORAZEPAM 1 MG/1
1 TABLET ORAL 2 TIMES DAILY PRN
Qty: 60 TABLET | Refills: 3 | Status: SHIPPED | OUTPATIENT
Start: 2023-09-25

## 2023-09-25 NOTE — PROGRESS NOTES
Assessment and Plan:     Problem List Items Addressed This Visit     Essential hypertension    Relevant Medications    benazepril (LOTENSIN) 40 MG tablet    amLODIPine (NORVASC) 5 mg tablet    metoprolol succinate (Toprol XL) 25 mg 24 hr tablet    Breast cancer, stage 1, right (HCC)    Hyperlipidemia   Other Visit Diagnoses     Medicare annual wellness visit, initial    -  Primary    Primary hypertension        Relevant Medications    benazepril (LOTENSIN) 40 MG tablet    amLODIPine (NORVASC) 5 mg tablet    metoprolol succinate (Toprol XL) 25 mg 24 hr tablet    Anxiety        Relevant Medications    LORazepam (ATIVAN) 1 mg tablet    Stress at home               Preventive health issues were discussed with patient, and age appropriate screening tests were ordered as noted in patient's After Visit Summary. Personalized health advice and appropriate referrals for health education or preventive services given if needed, as noted in patient's After Visit Summary. History of Present Illness:     Patient presents for a Medicare Wellness Visit    Medicare PE. Anxiety stems from  per patient. Has good and bad days, good days is when working. Starting to depend on Lorazepam, wants to restart Celexa. Takes lorazepam HS  if needed 1/2 tab daytime. Working 20 hours a week, 4 days. SH: neg tob, glass of wine in evening. BP controlled. Patient Care Team:  Man Fox DO as PCP - General  Ethelle Ridgel, DO Bianca Hoots, MD Fidel Hills, DO Catarina Bloch, MD (Inactive)     Review of Systems:   I have spent a total time of 30 minutes on 10/02/23 in caring for this patient including Diagnostic results, Risks and benefits of tx options, Instructions for management, Patient and family education, Importance of tx compliance, Risk factor reductions and Impressions. Review of Systems   Constitutional: Negative. HENT: Negative. Eyes: Negative. Respiratory: Negative. Cardiovascular: Negative. Gastrointestinal: Negative. Genitourinary: Negative. Musculoskeletal: Negative. Skin: Negative. Neurological: Negative. Psychiatric/Behavioral: The patient is nervous/anxious.          Problem List:     Patient Active Problem List   Diagnosis   • Contusion of left elbow   • Essential hypertension   • Current mild episode of major depressive disorder without prior episode (HCC)   • Tremor   • Breast cancer, stage 1, right (HCC)   • Johnston's esophagus with dysplasia   • Panic attacks   • Hyperlipidemia   • Diverticulosis   • Fibromyalgia, primary      Past Medical and Surgical History:     Past Medical History:   Diagnosis Date   • Allergic    • Anxiety    • Arthritis    • Johnston's esophagus 09/2020   • Bulging discs    • Cancer ([address]) Breast    R breast   • Chronic interstitial cystitis    • Colon polyp    • Diverticulosis    • Elevated amylase     Last Assessed:  3/24/15   • Fibromyalgia, primary    • GERD (gastroesophageal reflux disease)    • Hyperlipidemia    • Hypertension    • Irritable bowel syndrome    • Migraine    • Pelvic floor dysfunction    • Pneumonia    • Psychiatric disorder    • Ribs, multiple fractures     2/2019   • Serum lipase elevation     Last Assessed:  9/17/13   • Vitamin D deficiency      Past Surgical History:   Procedure Laterality Date   • BAND HEMORRHOIDECTOMY      internal   • BREAST LUMPECTOMY Right 10/2016   • CHOLECYSTECTOMY     • COLONOSCOPY      2016, "very difficult exam" per H&P   • TONSILLECTOMY     • TOOTH EXTRACTION     • TOTAL ABDOMINAL HYSTERECTOMY W/ BILATERAL SALPINGOOPHORECTOMY  08/04/2021   • TUBAL LIGATION        Family History:     Family History   Problem Relation Age of Onset   • Hypertension Mother    • Heart disease Father    • Hypertension Father    • Stroke Father         Syndrome      Social History:     Social History     Socioeconomic History   • Marital status: /Civil Union     Spouse name: None   • Number of children: None   • Years of education: None   • Highest education level: None   Occupational History   • Occupation: Retired   Tobacco Use   • Smoking status: Former     Packs/day: 0.50     Types: Cigarettes     Start date:      Quit date:      Years since quittin.7   • Smokeless tobacco: Never   Vaping Use   • Vaping Use: Never used   Substance and Sexual Activity   • Alcohol use: Never     Comment: social   • Drug use: No     Comment: CBD oil with tea at night   • Sexual activity: Not Currently   Other Topics Concern   • None   Social History Narrative    Uses safety equipment     Social Determinants of Health     Financial Resource Strain: Unknown (2023)    Overall Financial Resource Strain (CARDIA)    • Difficulty of Paying Living Expenses: Patient refused   Food Insecurity: Not on file   Transportation Needs: No Transportation Needs (2023)    PRAPARE - Transportation    • Lack of Transportation (Medical): No    • Lack of Transportation (Non-Medical):  No   Physical Activity: Not on file   Stress: Not on file   Social Connections: Not on file   Intimate Partner Violence: Not on file   Housing Stability: Not on file      Medications and Allergies:     Current Outpatient Medications   Medication Sig Dispense Refill   • acetaminophen (TYLENOL) 650 mg CR tablet Take 650 mg by mouth every 8 (eight) hours as needed     • amLODIPine (NORVASC) 5 mg tablet Take 1 tablet by mouth daily     • benazepril (LOTENSIN) 40 MG tablet Take 1 tablet by mouth daily     • citalopram (CeleXA) 10 mg tablet Take 1 tablet (10 mg total) by mouth daily 30 tablet 5   • famotidine (PEPCID) 20 mg tablet      • Lactobacillus Rhamnosus, GG, (Culturelle Kids) CHEW Chew 1 tablet every evening     • LORazepam (ATIVAN) 1 mg tablet Take 1 tablet (1 mg total) by mouth 2 (two) times a day as needed for anxiety 60 tablet 3   • metoprolol succinate (Toprol XL) 25 mg 24 hr tablet Take 1 tablet (25 mg total) by mouth daily 90 tablet 1   • omeprazole (PriLOSEC) 20 mg delayed release capsule Take 20 mg by mouth daily TAKEN EVERY OTHER DAY      • rosuvastatin (CRESTOR) 5 mg tablet Take 5 mg by mouth 3 (three) times a week     • methylPREDNISolone 4 MG tablet therapy pack Use as directed on package (Patient not taking: Reported on 9/8/2023) 21 each 0     No current facility-administered medications for this visit. Allergies   Allergen Reactions   • Amoxicillin Other (See Comments)     C-Diff   • Bactrim [Sulfamethoxazole-Trimethoprim]    • Doxycycline GI Intolerance   • Lyrica [Pregabalin]    • Pneumovax 23 [Pneumococcal Vac Polyvalent]    • Pollen Extract    • Prevnar 13 [Pneumococcal 13-Deandra Conj Vacc] Swelling   • Sertraline Chest Pain   • Trazodone Other (See Comments)     Dry mouth    • Zithromax [Azithromycin]       Immunizations:     Immunization History   Administered Date(s) Administered   • COVID-19 PFIZER VACCINE 0.3 ML IM 02/23/2021, 03/14/2021, 10/05/2021, 10/04/2022   • COVID-19 Pfizer vac 5-11y esme-sucrose 0.2 mL IM (orange cap) 04/01/2022   • INFLUENZA 12/09/2014, 10/05/2015, 10/05/2015, 10/04/2016   • Influenza Quadrivalent 3 years and older 10/01/2020   • Influenza Split High Dose Preservative Free IM 10/04/2016   • Influenza, high dose seasonal 0.7 mL 10/11/2019   • Rabies, Intramuscular 03/17/2016, 03/20/2016   • Tdap 07/12/2021      Health Maintenance:         Topic Date Due   • Breast Cancer Screening: Mammogram  08/15/2024   • Hepatitis C Screening  Completed   • Colorectal Cancer Screening  Discontinued         Topic Date Due   • Pneumococcal Vaccine: 65+ Years (1 - PCV) Never done   • COVID-19 Vaccine (5 - Pfizer series) 11/29/2022   • Influenza Vaccine (1) 09/01/2023      Medicare Screening Tests and Risk Assessments:     Tasneem Cary is here for her Initial Wellness visit. Health Risk Assessment:   Patient rates overall health as fair. Patient feels that their physical health rating is slightly worse.  Patient is satisfied with their life. Luster Randy was rated as slightly worse. Hearing was rated as same. Patient feels that their emotional and mental health rating is same. Patients states they are sometimes angry. Patient states they are often unusually tired/fatigued. Pain experienced in the last 7 days has been some. Patient's pain rating has been 6/10. Patient states that she has experienced weight loss or gain in last 6 months. Depression Screening:   PHQ-9 Score: 9      Fall Risk Screening: In the past year, patient has experienced: no history of falling in past year      Urinary Incontinence Screening:   Patient has not leaked urine accidently in the last six months. Home Safety:  Patient has trouble with stairs inside or outside of their home. Patient has working smoke alarms and has working carbon monoxide detector. Home safety hazards include: none. Nutrition:   Current diet is No Added Salt. Medications:   Patient is currently taking over-the-counter supplements. OTC medications include: Magnesium. Patient is able to manage medications. Activities of Daily Living (ADLs)/Instrumental Activities of Daily Living (IADLs):   Walk and transfer into and out of bed and chair?: Yes  Dress and groom yourself?: Yes    Bathe or shower yourself?: Yes    Feed yourself?  Yes  Do your laundry/housekeeping?: Yes  Manage your money, pay your bills and track your expenses?: Yes  Make your own meals?: Yes    Do your own shopping?: Yes    Previous Hospitalizations:   Any hospitalizations or ED visits within the last 12 months?: Yes    How many hospitalizations have you had in the last year?: 1-2    Advance Care Planning:   Living will: No    Durable POA for healthcare: No    Advanced directive: No      Cognitive Screening:   Provider or family/friend/caregiver concerned regarding cognition?: No    PREVENTIVE SCREENINGS      Cardiovascular Screening:    General: Screening Not Indicated and History Lipid Disorder      Diabetes Screening: General: Screening Not Indicated      Colorectal Cancer Screening:     General: Screening Current      Breast Cancer Screening:     General: History Breast Cancer and Screening Current      Cervical Cancer Screening:    General: Screening Not Indicated      Osteoporosis Screening:    General: Screening Current      Abdominal Aortic Aneurysm (AAA) Screening:        General: Screening Not Indicated      Lung Cancer Screening:     General: Screening Not Indicated      Hepatitis C Screening:    General: Screening Current    Screening, Brief Intervention, and Referral to Treatment (SBIRT)    Screening  Typical number of drinks in a day: 1  Typical number of drinks in a week: 3  Interpretation: Low risk drinking behavior. AUDIT-C Screenin) How often did you have a drink containing alcohol in the past year? 2 to 4 times a month  2) How many drinks did you have on a typical day when you were drinking in the past year? 1 to 2  3) How often did you have 6 or more drinks on one occasion in the past year? never    AUDIT-C Score: 2  Interpretation: Score 0-2 (female): Negative screen for alcohol misuse    Single Item Drug Screening:  How often have you used an illegal drug (including marijuana) or a prescription medication for non-medical reasons in the past year? never    Single Item Drug Screen Score: 0  Interpretation: Negative screen for possible drug use disorder    Other Counseling Topics:   Calcium and vitamin D intake and regular weightbearing exercise. No results found. Physical Exam:     /68 (BP Location: Left arm, Patient Position: Sitting, Cuff Size: Standard)   Pulse 60   Temp 98.4 °F (36.9 °C) (Oral)   Resp 18   Ht 5' 4" (1.626 m)   Wt 56.2 kg (124 lb)   LMP  (LMP Unknown)   SpO2 100%   BMI 21.28 kg/m²     Physical Exam  Vitals and nursing note reviewed. Constitutional:       General: She is not in acute distress. Appearance: She is well-developed.    HENT:      Head: Normocephalic and atraumatic. Right Ear: External ear normal.      Left Ear: External ear normal.      Nose: Nose normal.      Mouth/Throat:      Mouth: Mucous membranes are moist.      Pharynx: Oropharynx is clear. Eyes:      Conjunctiva/sclera: Conjunctivae normal.      Pupils: Pupils are equal, round, and reactive to light. Cardiovascular:      Rate and Rhythm: Normal rate and regular rhythm. Heart sounds: No murmur heard. Pulmonary:      Effort: Pulmonary effort is normal. No respiratory distress. Breath sounds: Normal breath sounds. Abdominal:      Palpations: Abdomen is soft. Tenderness: There is no abdominal tenderness. Musculoskeletal:         General: No swelling. Cervical back: Neck supple. Skin:     General: Skin is warm and dry. Capillary Refill: Capillary refill takes less than 2 seconds. Neurological:      Mental Status: She is alert and oriented to person, place, and time. Psychiatric:         Mood and Affect: Mood normal.         Behavior: Behavior normal.         Thought Content:  Thought content normal.         Judgment: Judgment normal.          Paige Mckeon,

## 2023-09-25 NOTE — PATIENT INSTRUCTIONS
Medicare Preventive Visit Patient Instructions  Thank you for completing your Welcome to Medicare Visit or Medicare Annual Wellness Visit today. Your next wellness visit will be due in one year (9/25/2024). The screening/preventive services that you may require over the next 5-10 years are detailed below. Some tests may not apply to you based off risk factors and/or age. Screening tests ordered at today's visit but not completed yet may show as past due. Also, please note that scanned in results may not display below. Preventive Screenings:  Service Recommendations Previous Testing/Comments   Colorectal Cancer Screening  * Colonoscopy    * Fecal Occult Blood Test (FOBT)/Fecal Immunochemical Test (FIT)  * Fecal DNA/Cologuard Test  * Flexible Sigmoidoscopy Age: 43-73 years old   Colonoscopy: every 10 years (may be performed more frequently if at higher risk)  OR  FOBT/FIT: every 1 year  OR  Cologuard: every 3 years  OR  Sigmoidoscopy: every 5 years  Screening may be recommended earlier than age 39 if at higher risk for colorectal cancer. Also, an individualized decision between you and your healthcare provider will decide whether screening between the ages of 77-80 would be appropriate. Colonoscopy: 01/23/2023  FOBT/FIT: Not on file  Cologuard: Not on file  Sigmoidoscopy: Not on file    Screening Current     Breast Cancer Screening Age: 36 years old  Frequency: every 1-2 years  Not required if history of left and right mastectomy Mammogram: 08/15/2023    History Breast Cancer   Cervical Cancer Screening Between the ages of 21-29, pap smear recommended once every 3 years. Between the ages of 32-69, can perform pap smear with HPV co-testing every 5 years.    Recommendations may differ for women with a history of total hysterectomy, cervical cancer, or abnormal pap smears in past. Pap Smear: 01/21/2020    Screening Not Indicated   Hepatitis C Screening Once for adults born between 1945 and 1965  More frequently in patients at high risk for Hepatitis C Hep C Antibody: 01/24/2020    Screening Current   Diabetes Screening 1-2 times per year if you're at risk for diabetes or have pre-diabetes Fasting glucose: 101 mg/dL (2/26/2019)  A1C: 5.6 % (10/21/2021)  Screening Not Indicated   Cholesterol Screening Once every 5 years if you don't have a lipid disorder. May order more often based on risk factors. Lipid panel: 02/03/2023    Screening Not Indicated  History Lipid Disorder     Other Preventive Screenings Covered by Medicare:  1. Abdominal Aortic Aneurysm (AAA) Screening: covered once if your at risk. You're considered to be at risk if you have a family history of AAA. 2. Lung Cancer Screening: covers low dose CT scan once per year if you meet all of the following conditions: (1) Age 48-67; (2) No signs or symptoms of lung cancer; (3) Current smoker or have quit smoking within the last 15 years; (4) You have a tobacco smoking history of at least 20 pack years (packs per day multiplied by number of years you smoked); (5) You get a written order from a healthcare provider. 3. Glaucoma Screening: covered annually if you're considered high risk: (1) You have diabetes OR (2) Family history of glaucoma OR (3)  aged 48 and older OR (3)  American aged 72 and older  3. Osteoporosis Screening: covered every 2 years if you meet one of the following conditions: (1) You're estrogen deficient and at risk for osteoporosis based off medical history and other findings; (2) Have a vertebral abnormality; (3) On glucocorticoid therapy for more than 3 months; (4) Have primary hyperparathyroidism; (5) On osteoporosis medications and need to assess response to drug therapy. · Last bone density test (DXA Scan): 05/04/2022.  5. HIV Screening: covered annually if you're between the age of 15-65. Also covered annually if you are younger than 13 and older than 72 with risk factors for HIV infection.  For pregnant patients, it is covered up to 3 times per pregnancy. Immunizations:  Immunization Recommendations   Influenza Vaccine Annual influenza vaccination during flu season is recommended for all persons aged >= 6 months who do not have contraindications   Pneumococcal Vaccine   * Pneumococcal conjugate vaccine = PCV13 (Prevnar 13), PCV15 (Vaxneuvance), PCV20 (Prevnar 20)  * Pneumococcal polysaccharide vaccine = PPSV23 (Pneumovax) Adults 20-63 years old: 1-3 doses may be recommended based on certain risk factors  Adults 72 years old: 1-2 doses may be recommended based off what pneumonia vaccine you previously received   Hepatitis B Vaccine 3 dose series if at intermediate or high risk (ex: diabetes, end stage renal disease, liver disease)   Tetanus (Td) Vaccine - COST NOT COVERED BY MEDICARE PART B Following completion of primary series, a booster dose should be given every 10 years to maintain immunity against tetanus. Td may also be given as tetanus wound prophylaxis. Tdap Vaccine - COST NOT COVERED BY MEDICARE PART B Recommended at least once for all adults. For pregnant patients, recommended with each pregnancy. Shingles Vaccine (Shingrix) - COST NOT COVERED BY MEDICARE PART B  2 shot series recommended in those aged 48 and above     Health Maintenance Due:      Topic Date Due   • Breast Cancer Screening: Mammogram  08/15/2024   • Hepatitis C Screening  Completed   • Colorectal Cancer Screening  Discontinued     Immunizations Due:      Topic Date Due   • Pneumococcal Vaccine: 65+ Years (1 - PCV) Never done   • COVID-19 Vaccine (5 - Pfizer series) 11/29/2022   • Influenza Vaccine (1) 09/01/2023     Advance Directives   What are advance directives? Advance directives are legal documents that state your wishes and plans for medical care. These plans are made ahead of time in case you lose your ability to make decisions for yourself.  Advance directives can apply to any medical decision, such as the treatments you want, and if you want to donate organs. What are the types of advance directives? There are many types of advance directives, and each state has rules about how to use them. You may choose a combination of any of the following:  · Living will: This is a written record of the treatment you want. You can also choose which treatments you do not want, which to limit, and which to stop at a certain time. This includes surgery, medicine, IV fluid, and tube feedings. · Durable power of  for healthcare [facility]): This is a written record that states who you want to make healthcare choices for you when you are unable to make them for yourself. This person, called a proxy, is usually a family member or a friend. You may choose more than 1 proxy. · Do not resuscitate (DNR) order:  A DNR order is used in case your heart stops beating or you stop breathing. It is a request not to have certain forms of treatment, such as CPR. A DNR order may be included in other types of advance directives. · Medical directive: This covers the care that you want if you are in a coma, near death, or unable to make decisions for yourself. You can list the treatments you want for each condition. Treatment may include pain medicine, surgery, blood transfusions, dialysis, IV or tube feedings, and a ventilator (breathing machine). · Values history: This document has questions about your views, beliefs, and how you feel and think about life. This information can help others choose the care that you would choose. Why are advance directives important? An advance directive helps you control your care. Although spoken wishes may be used, it is better to have your wishes written down. Spoken wishes can be misunderstood, or not followed. Treatments may be given even if you do not want them. An advance directive may make it easier for your family to make difficult choices about your care.        © Copyright [employer] 2018 Information is for End User's use only and may not be sold, redistributed or otherwise used for commercial purposes.  All illustrations and images included in CareNotes® are the copyrighted property of [employer] or [address] [place] [address]

## 2023-11-06 ENCOUNTER — OFFICE VISIT (OUTPATIENT)
Dept: FAMILY MEDICINE CLINIC | Facility: CLINIC | Age: 78
End: 2023-11-06
Payer: COMMERCIAL

## 2023-11-06 VITALS
OXYGEN SATURATION: 98 % | BODY MASS INDEX: 21.48 KG/M2 | SYSTOLIC BLOOD PRESSURE: 136 MMHG | RESPIRATION RATE: 18 BRPM | HEIGHT: 64 IN | TEMPERATURE: 98.5 F | HEART RATE: 64 BPM | WEIGHT: 125.8 LBS | DIASTOLIC BLOOD PRESSURE: 88 MMHG

## 2023-11-06 DIAGNOSIS — F33.0 MILD EPISODE OF RECURRENT MAJOR DEPRESSIVE DISORDER (HCC): Primary | ICD-10-CM

## 2023-11-06 DIAGNOSIS — F43.9 STRESS AT HOME: ICD-10-CM

## 2023-11-06 DIAGNOSIS — I10 ESSENTIAL HYPERTENSION: ICD-10-CM

## 2023-11-06 DIAGNOSIS — F41.9 ANXIETY: ICD-10-CM

## 2023-11-06 PROCEDURE — 99215 OFFICE O/P EST HI 40 MIN: CPT | Performed by: FAMILY MEDICINE

## 2023-11-06 RX ORDER — CITALOPRAM 20 MG/1
20 TABLET ORAL DAILY
Qty: 30 TABLET | Refills: 3 | Status: SHIPPED | OUTPATIENT
Start: 2023-11-06

## 2023-11-06 RX ORDER — LORAZEPAM 1 MG/1
1 TABLET ORAL 2 TIMES DAILY PRN
Qty: 60 TABLET | Refills: 3 | Status: SHIPPED | OUTPATIENT
Start: 2023-11-06

## 2023-11-06 NOTE — PROGRESS NOTES
Name: Annika Callejas      : [id number]      MRN: [id number]  Encounter Provider: Ruddy Chand DO  Encounter Date: 2023   Encounter department: [facility]    Assessment & Plan     Avoid triggers,eg - the news. Discussed + thinking and not focusing on the negatives. Increase Celexa to 20 mg. Chief Complaint   Patient presents with    Hypertension    Anxiety    Depression      1. Mild episode of recurrent major depressive disorder (HCC)  -     citalopram (CeleXA) 20 mg tablet; Take 1 tablet (20 mg total) by mouth daily    2. Anxiety  -     LORazepam (ATIVAN) 1 mg tablet; Take 1 tablet (1 mg total) by mouth 2 (two) times a day as needed for anxiety  -     citalopram (CeleXA) 20 mg tablet; Take 1 tablet (20 mg total) by mouth daily    3. Stress at home    4. Essential hypertension           Subjective     Feeling sad days not working. No complaints with Celexa 10 mg. Worries about everything when not working. One son in [place] with MI, alcohol abuse. Stuck on the news and events going on through out the world. Amlodipine increased to 5 mg, Benazepril increased to 40 mg.    I have spent a total time of 40 minutes on 23 in caring for this patient including Risks and benefits of tx options, Instructions for management, Patient and family education, Importance of tx compliance, Risk factor reductions, and Impressions. Review of Systems   Constitutional: Negative. HENT: Negative. Eyes: Negative. Respiratory: Negative. Cardiovascular: Negative. Gastrointestinal: Negative. Genitourinary: Negative. Musculoskeletal: Negative. Skin: Negative. Neurological: Negative. Psychiatric/Behavioral: Negative.          Past Medical History:   Diagnosis Date    Allergic     Anxiety     Arthritis     Johnston's esophagus 2020    Bulging discs     Cancer (HCC) Breast    R breast    Chronic interstitial cystitis     Colon polyp     Diverticulosis     Elevated amylase     Last Assessed:  3/24/15    Fibromyalgia, primary     GERD (gastroesophageal reflux disease)     Hyperlipidemia     Hypertension     Irritable bowel syndrome     Migraine     Pelvic floor dysfunction     Pneumonia     Psychiatric disorder     Ribs, multiple fractures     2019    Serum lipase elevation     Last Assessed:  13    Vitamin D deficiency      Past Surgical History:   Procedure Laterality Date    BAND HEMORRHOIDECTOMY      internal    BREAST LUMPECTOMY Right 10/2016    CHOLECYSTECTOMY      COLONOSCOPY      , "very difficult exam" per H&P    TONSILLECTOMY      TOOTH EXTRACTION      TOTAL ABDOMINAL HYSTERECTOMY W/ BILATERAL SALPINGOOPHORECTOMY  2021    TUBAL LIGATION       Family History   Problem Relation Age of Onset    Hypertension Mother     Heart disease Father     Hypertension Father     Stroke Father         Syndrome     Social History     Socioeconomic History    Marital status: /Civil Union     Spouse name: None    Number of children: None    Years of education: None    Highest education level: None   Occupational History    Occupation: Retired   Tobacco Use    Smoking status: Former     Packs/day: 0.50     Types: Cigarettes     Start date:      Quit date:      Years since quittin.8    Smokeless tobacco: Never   Vaping Use    Vaping Use: Never used   Substance and Sexual Activity    Alcohol use: Never     Comment: social    Drug use: No     Comment: CBD oil with tea at night    Sexual activity: Not Currently   Other Topics Concern    None   Social History Narrative    Uses safety equipment     Social Determinants of Health     Financial Resource Strain: Unknown (2023)    Overall Financial Resource Strain (CARDIA)     Difficulty of Paying Living Expenses: Patient refused   Food Insecurity: Not on file   Transportation Needs: No Transportation Needs (2023)    PRAPARE - Transportation     Lack of Transportation (Medical): No     Lack of Transportation (Non-Medical):  No   Physical Activity: Not on file   Stress: Not on file   Social Connections: Not on file   Intimate Partner Violence: Not on file   Housing Stability: Not on file     Current Outpatient Medications on File Prior to Visit   Medication Sig    acetaminophen (TYLENOL) 650 mg CR tablet Take 650 mg by mouth every 8 (eight) hours as needed    amLODIPine (NORVASC) 5 mg tablet Take 1 tablet by mouth daily    benazepril (LOTENSIN) 40 MG tablet Take 1 tablet by mouth daily    Lactobacillus Rhamnosus, GG, (Culturelle Kids) CHEW Chew 1 tablet every evening    metoprolol succinate (Toprol XL) 25 mg 24 hr tablet Take 1 tablet (25 mg total) by mouth daily    omeprazole (PriLOSEC) 20 mg delayed release capsule Take 20 mg by mouth daily TAKEN EVERY OTHER DAY     rosuvastatin (CRESTOR) 5 mg tablet Take 5 mg by mouth 3 (three) times a week    [DISCONTINUED] citalopram (CeleXA) 10 mg tablet Take 1 tablet (10 mg total) by mouth daily    [DISCONTINUED] LORazepam (ATIVAN) 1 mg tablet Take 1 tablet (1 mg total) by mouth 2 (two) times a day as needed for anxiety    famotidine (PEPCID) 20 mg tablet  (Patient not taking: Reported on 11/6/2023)    methylPREDNISolone 4 MG tablet therapy pack Use as directed on package (Patient not taking: Reported on 9/8/2023)     Allergies   Allergen Reactions    Amoxicillin Other (See Comments)     C-Diff    Bactrim [Sulfamethoxazole-Trimethoprim]     Doxycycline GI Intolerance    Lyrica [Pregabalin]     Pneumovax 23 [Pneumococcal Vac Polyvalent]     Pollen Extract     Prevnar 13 [Pneumococcal 13-Deandra Conj Vacc] Swelling    Sertraline Chest Pain    Trazodone Other (See Comments)     Dry mouth     Zithromax [Azithromycin]      Immunization History   Administered Date(s) Administered    COVID-19 PFIZER VACCINE 0.3 ML IM 02/23/2021, 03/14/2021, 10/05/2021, 10/04/2022    COVID-19 Pfizer vac 5-11y esme-sucrose 0.2 mL IM (orange cap) 04/01/2022    INFLUENZA 12/09/2014, 10/05/2015, 10/05/2015, 10/04/2016    Influenza Quadrivalent 3 years and older 10/01/2020    Influenza Split High Dose Preservative Free IM 10/04/2016    Influenza, high dose seasonal 0.7 mL 10/11/2019    Rabies, Intramuscular 03/17/2016, 03/20/2016    Tdap 07/12/2021       Objective     /88 (BP Location: Left arm, Patient Position: Sitting, Cuff Size: Standard)   Pulse 64   Temp 98.5 °F (36.9 °C) (Oral)   Resp 18   Ht 5' 4" (1.626 m)   Wt 57.1 kg (125 lb 12.8 oz)   LMP  (LMP Unknown)   SpO2 98%   BMI 21.59 kg/m²     Physical Exam  Constitutional:       Appearance: She is well-developed. HENT:      Head: Normocephalic and atraumatic. Right Ear: External ear normal.      Left Ear: External ear normal.      Nose: Nose normal.      Mouth/Throat:      Mouth: Mucous membranes are moist.      Pharynx: Oropharynx is clear. Eyes:      Conjunctiva/sclera: Conjunctivae normal.      Pupils: Pupils are equal, round, and reactive to light. Cardiovascular:      Rate and Rhythm: Normal rate and regular rhythm. Heart sounds: Normal heart sounds. Pulmonary:      Effort: Pulmonary effort is normal.      Breath sounds: Normal breath sounds. Abdominal:      General: Bowel sounds are normal.      Palpations: Abdomen is soft. Musculoskeletal:      Cervical back: Normal range of motion and neck supple. Skin:     General: Skin is warm and dry. Neurological:      Mental Status: She is alert and oriented to person, place, and time. Deep Tendon Reflexes: Reflexes are normal and symmetric. Psychiatric:         Mood and Affect: Mood normal.         Behavior: Behavior normal.         Thought Content:  Thought content normal.         Judgment: Judgment normal.       Anan Flatter, DO

## 2023-12-10 ENCOUNTER — RA CDI HCC (OUTPATIENT)
Dept: OTHER | Facility: HOSPITAL | Age: 78
End: 2023-12-10

## 2023-12-10 NOTE — PROGRESS NOTES
[address] [facility] coding opportunities       Chart reviewed, no opportunity found: [address]        Patients Insurance     Medicare Insurance: [employer] Advantage

## 2023-12-18 ENCOUNTER — OFFICE VISIT (OUTPATIENT)
Dept: FAMILY MEDICINE CLINIC | Facility: CLINIC | Age: 78
End: 2023-12-18
Payer: COMMERCIAL

## 2023-12-18 VITALS
WEIGHT: 125.8 LBS | HEIGHT: 64 IN | SYSTOLIC BLOOD PRESSURE: 124 MMHG | BODY MASS INDEX: 21.48 KG/M2 | RESPIRATION RATE: 18 BRPM | OXYGEN SATURATION: 98 % | TEMPERATURE: 98 F | HEART RATE: 63 BPM | DIASTOLIC BLOOD PRESSURE: 72 MMHG

## 2023-12-18 DIAGNOSIS — F41.9 ANXIETY: Primary | ICD-10-CM

## 2023-12-18 PROCEDURE — 99213 OFFICE O/P EST LOW 20 MIN: CPT | Performed by: FAMILY MEDICINE

## 2023-12-18 RX ORDER — UREA 10 %
1 LOTION (ML) TOPICAL EVERY EVENING
COMMUNITY

## 2023-12-18 NOTE — PROGRESS NOTES
"Name: Karen Strange      : 1945      MRN: [id number]  Encounter Provider: Baljit Bah DO  Encounter Date: 2023   Encounter department: [facility]    Assessment & Plan     Chief Complaint   Patient presents with    Anxiety    Depression     Follow up. Citalopram was changed from 10 mg to 20 mg daily. Patient took  20 mg for about a month then went back to 10 mg due to GI discomfort.      1. Anxiety           Subjective     Never started the Celexa 20 mg, taking Lorazepam BID, one for sleep and one in am.  Depression improved.  Anxiety continues.  Having issues with right hip - this is contributes to anxiety.      Review of Systems    Past Medical History:   Diagnosis Date    Allergic     Anxiety     Arthritis     Johnston's esophagus 2020    Bulging discs     Cancer (HCC) Breast    R breast    Chronic interstitial cystitis     Colon polyp     Diverticulosis     Elevated amylase     Last Assessed:  3/24/15    Fibromyalgia, primary     GERD (gastroesophageal reflux disease)     Hyperlipidemia     Hypertension     Irritable bowel syndrome     Migraine     Pelvic floor dysfunction     Pneumonia     Psychiatric disorder     Ribs, multiple fractures     2019    Serum lipase elevation     Last Assessed:  13    Vitamin D deficiency      Past Surgical History:   Procedure Laterality Date    BAND HEMORRHOIDECTOMY      internal    BREAST LUMPECTOMY Right 10/2016    CHOLECYSTECTOMY      COLONOSCOPY      , \"very difficult exam\" per H&P    TONSILLECTOMY      TOOTH EXTRACTION      TOTAL ABDOMINAL HYSTERECTOMY W/ BILATERAL SALPINGOOPHORECTOMY  2021    TUBAL LIGATION       Family History   Problem Relation Age of Onset    Hypertension Mother     Heart disease Father     Hypertension Father     Stroke Father         Syndrome     Social History     Socioeconomic History    Marital status: /Civil Union     Spouse name: None    Number of children: None    Years of education: None "    Highest education level: None   Occupational History    Occupation: Retired   Tobacco Use    Smoking status: Former     Current packs/day: 0.00     Average packs/day: 0.5 packs/day for 27.0 years (13.5 ttl pk-yrs)     Types: Cigarettes     Start date:      Quit date:      Years since quittin.9    Smokeless tobacco: Never   Vaping Use    Vaping status: Never Used   Substance and Sexual Activity    Alcohol use: Never     Comment: social    Drug use: No     Comment: CBD oil with tea at night    Sexual activity: Not Currently   Other Topics Concern    None   Social History Narrative    Uses safety equipment     Social Determinants of Health     Financial Resource Strain: Patient Declined (2023)    Overall Financial Resource Strain (CARDIA)     Difficulty of Paying Living Expenses: Patient declined   Food Insecurity: Not on file   Transportation Needs: No Transportation Needs (2023)    PRAPARE - Transportation     Lack of Transportation (Medical): No     Lack of Transportation (Non-Medical): No   Physical Activity: Not on file   Stress: Not on file   Social Connections: Not on file   Intimate Partner Violence: Not on file   Housing Stability: Not on file     Current Outpatient Medications on File Prior to Visit   Medication Sig    acetaminophen (TYLENOL) 650 mg CR tablet Take 650 mg by mouth every 8 (eight) hours as needed    amLODIPine (NORVASC) 5 mg tablet Take 1 tablet by mouth daily    benazepril (LOTENSIN) 40 MG tablet Take 1 tablet by mouth daily    citalopram (CeleXA) 20 mg tablet Take 1 tablet (20 mg total) by mouth daily (Patient taking differently: Take 10 mg by mouth daily)    Lactobacillus Rhamnosus, GG, (Culturelle Kids) CHEW Chew 1 tablet every evening    LORazepam (ATIVAN) 1 mg tablet Take 1 tablet (1 mg total) by mouth 2 (two) times a day as needed for anxiety    metoprolol succinate (Toprol XL) 25 mg 24 hr tablet Take 1 tablet (25 mg total) by mouth daily    omeprazole  "(PriLOSEC) 20 mg delayed release capsule Take 20 mg by mouth daily TAKEN EVERY OTHER DAY     rosuvastatin (CRESTOR) 5 mg tablet Take 5 mg by mouth 3 (three) times a week    famotidine (PEPCID) 20 mg tablet  (Patient not taking: Reported on 11/6/2023)    Lactobacillus TABS Take 1 tablet by mouth every evening    methylPREDNISolone 4 MG tablet therapy pack Use as directed on package     Allergies   Allergen Reactions    Amoxicillin Other (See Comments)     C-Diff    Bactrim [Sulfamethoxazole-Trimethoprim]     Doxycycline GI Intolerance    Lyrica [Pregabalin]     Pneumovax 23 [Pneumococcal Vac Polyvalent]     Pollen Extract     Prevnar 13 [Pneumococcal 13-Deandra Conj Vacc] Swelling    Sertraline Chest Pain    Trazodone Other (See Comments)     Dry mouth     Zithromax [Azithromycin]      Immunization History   Administered Date(s) Administered    COVID-19 PFIZER VACCINE 0.3 ML IM 02/23/2021, 03/14/2021, 10/05/2021, 10/04/2022    COVID-19 Pfizer vac 5-11y esme-sucrose 0.2 mL IM (orange cap) 04/01/2022    INFLUENZA 12/09/2014, 10/05/2015, 10/05/2015, 10/04/2016    Influenza Quadrivalent 3 years and older 10/01/2020    Influenza Split High Dose Preservative Free IM 10/04/2016    Influenza, high dose seasonal 0.7 mL 10/11/2019    Rabies, Intramuscular 03/17/2016, 03/20/2016    Tdap 07/12/2021       Objective     /72 (BP Location: Left arm, Patient Position: Sitting, Cuff Size: Standard)   Pulse 63   Temp 98 °F (36.7 °C) (Oral)   Resp 18   Ht 5' 4\" (1.626 m)   Wt 57.1 kg (125 lb 12.8 oz)   LMP  (LMP Unknown)   SpO2 98%   BMI 21.59 kg/m²     Physical Exam  Constitutional:       Appearance: She is well-developed.   HENT:      Head: Normocephalic and atraumatic.      Right Ear: External ear normal.      Left Ear: External ear normal.      Nose: Nose normal.   Eyes:      Conjunctiva/sclera: Conjunctivae normal.      Pupils: Pupils are equal, round, and reactive to light.   Cardiovascular:      Rate and Rhythm: Normal " rate and regular rhythm.      Heart sounds: Normal heart sounds.   Pulmonary:      Effort: Pulmonary effort is normal.      Breath sounds: Normal breath sounds.   Musculoskeletal:      Cervical back: Normal range of motion and neck supple.   Skin:     General: Skin is warm and dry.   Neurological:      Mental Status: She is alert and oriented to person, place, and time.      Deep Tendon Reflexes: Reflexes are normal and symmetric.   Psychiatric:         Behavior: Behavior normal.         Thought Content: Thought content normal.         Judgment: Judgment normal.       Baljit Bah, DO

## 2024-01-02 ENCOUNTER — TELEPHONE (OUTPATIENT)
Dept: FAMILY MEDICINE CLINIC | Facility: CLINIC | Age: 79
End: 2024-01-02

## 2024-01-02 DIAGNOSIS — U07.1 COVID: Primary | ICD-10-CM

## 2024-01-02 RX ORDER — METHYLPREDNISOLONE 4 MG/1
TABLET ORAL
Qty: 21 EACH | Refills: 0 | Status: SHIPPED | OUTPATIENT
Start: 2024-01-02

## 2024-01-02 NOTE — TELEPHONE ENCOUNTER
Patient called stated that she tested positive for COVID on Saturday 12/30/23, she has a really sore throat and will like to know if something can be called in.

## 2024-01-23 ENCOUNTER — OFFICE VISIT (OUTPATIENT)
Dept: FAMILY MEDICINE CLINIC | Facility: CLINIC | Age: 79
End: 2024-01-23
Payer: COMMERCIAL

## 2024-01-23 ENCOUNTER — NURSE TRIAGE (OUTPATIENT)
Age: 79
End: 2024-01-23

## 2024-01-23 VITALS
HEIGHT: 64 IN | WEIGHT: 125.2 LBS | DIASTOLIC BLOOD PRESSURE: 82 MMHG | SYSTOLIC BLOOD PRESSURE: 142 MMHG | RESPIRATION RATE: 18 BRPM | BODY MASS INDEX: 21.37 KG/M2 | TEMPERATURE: 97.8 F | HEART RATE: 62 BPM | OXYGEN SATURATION: 100 %

## 2024-01-23 DIAGNOSIS — F33.0 MILD EPISODE OF RECURRENT MAJOR DEPRESSIVE DISORDER (HCC): ICD-10-CM

## 2024-01-23 DIAGNOSIS — C50.911 BREAST CANCER, STAGE 1, RIGHT (HCC): ICD-10-CM

## 2024-01-23 DIAGNOSIS — N39.0 URINARY TRACT INFECTION WITHOUT HEMATURIA, SITE UNSPECIFIED: Primary | ICD-10-CM

## 2024-01-23 LAB
SL AMB  POCT GLUCOSE, UA: NEGATIVE
SL AMB LEUKOCYTE ESTERASE,UA: ABNORMAL
SL AMB POCT BILIRUBIN,UA: NEGATIVE
SL AMB POCT BLOOD,UA: NEGATIVE
SL AMB POCT CLARITY,UA: CLEAR
SL AMB POCT COLOR,UA: ABNORMAL
SL AMB POCT KETONES,UA: NEGATIVE
SL AMB POCT NITRITE,UA: NEGATIVE
SL AMB POCT PH,UA: 6
SL AMB POCT SPECIFIC GRAVITY,UA: 1.01
SL AMB POCT URINE PROTEIN: NEGATIVE
SL AMB POCT UROBILINOGEN: NEGATIVE

## 2024-01-23 PROCEDURE — 81002 URINALYSIS NONAUTO W/O SCOPE: CPT | Performed by: FAMILY MEDICINE

## 2024-01-23 PROCEDURE — 1159F MED LIST DOCD IN RCRD: CPT | Performed by: FAMILY MEDICINE

## 2024-01-23 PROCEDURE — 3077F SYST BP >= 140 MM HG: CPT | Performed by: FAMILY MEDICINE

## 2024-01-23 PROCEDURE — 1160F RVW MEDS BY RX/DR IN RCRD: CPT | Performed by: FAMILY MEDICINE

## 2024-01-23 PROCEDURE — 3079F DIAST BP 80-89 MM HG: CPT | Performed by: FAMILY MEDICINE

## 2024-01-23 PROCEDURE — 99213 OFFICE O/P EST LOW 20 MIN: CPT | Performed by: FAMILY MEDICINE

## 2024-01-23 RX ORDER — CIPROFLOXACIN 250 MG/1
250 TABLET, FILM COATED ORAL EVERY 12 HOURS SCHEDULED
Qty: 10 TABLET | Refills: 0 | Status: SHIPPED | OUTPATIENT
Start: 2024-01-23 | End: 2024-01-28

## 2024-01-23 NOTE — TELEPHONE ENCOUNTER
"Patient called with c/o UTI symptoms. Patient did complete an at home urine dip stick. I explained to the patient that we need a sample for culture. Patient scheduled a same day appointment for 5:15 pm.   Reason for Disposition   Patient wants to be seen    Answer Assessment - Initial Assessment Questions  1. SYMPTOM: \"What's the main symptom you're concerned about?\" (e.g., frequency, incontinence)      Frequency and burning  2. ONSET: \"When did the  frequency  start?\"      1 day ago  3. PAIN: \"Is there any pain?\" If Yes, ask: \"How bad is it?\" (Scale: 1-10; mild, moderate, severe)      yes  4. CAUSE: \"What do you think is causing the symptoms?\"      UTI  5. OTHER SYMPTOMS: \"Do you have any other symptoms?\" (e.g., fever, flank pain, blood in urine, pain with urination)      Mild pain in pelvic area, no hematuria  6. PREGNANCY: \"Is there any chance you are pregnant?\" \"When was your last menstrual period?\"      N/A    Protocols used: Urinary Symptoms-ADULT-[place]    "

## 2024-01-23 NOTE — TELEPHONE ENCOUNTER
Regarding: Bladder Infecton  ----- Message from Zhanna Flores sent at 1/23/2024  3:03 PM EST -----  Pt calling in with bladder infection symptoms. Started yesterday. Burning and frequent urination. Refusing an appt. Took an at home test and it is postive for Leukocytes.

## 2024-02-12 ENCOUNTER — RA CDI HCC (OUTPATIENT)
Dept: OTHER | Facility: HOSPITAL | Age: 79
End: 2024-02-12

## 2024-02-20 ENCOUNTER — CONSULT (OUTPATIENT)
Dept: FAMILY MEDICINE CLINIC | Facility: CLINIC | Age: 79
End: 2024-02-20
Payer: COMMERCIAL

## 2024-02-20 VITALS
HEIGHT: 64 IN | SYSTOLIC BLOOD PRESSURE: 118 MMHG | WEIGHT: 125.6 LBS | TEMPERATURE: 97.7 F | OXYGEN SATURATION: 97 % | DIASTOLIC BLOOD PRESSURE: 82 MMHG | HEART RATE: 62 BPM | BODY MASS INDEX: 21.44 KG/M2 | RESPIRATION RATE: 18 BRPM

## 2024-02-20 DIAGNOSIS — I10 ESSENTIAL HYPERTENSION: ICD-10-CM

## 2024-02-20 DIAGNOSIS — F32.0 CURRENT MILD EPISODE OF MAJOR DEPRESSIVE DISORDER WITHOUT PRIOR EPISODE (HCC): ICD-10-CM

## 2024-02-20 DIAGNOSIS — C50.911 BREAST CANCER, STAGE 1, RIGHT (HCC): ICD-10-CM

## 2024-02-20 DIAGNOSIS — Z01.818 PRE-OP EXAM: Primary | ICD-10-CM

## 2024-02-20 DIAGNOSIS — M25.551 RIGHT HIP PAIN: ICD-10-CM

## 2024-02-20 DIAGNOSIS — K22.719 BARRETT'S ESOPHAGUS WITH DYSPLASIA: ICD-10-CM

## 2024-02-20 PROCEDURE — 99215 OFFICE O/P EST HI 40 MIN: CPT | Performed by: FAMILY MEDICINE

## 2024-02-20 NOTE — PROGRESS NOTES
Name: Karen Strange      : 1945      MRN: [id number]  Encounter Provider: Baljit Bah DO  Encounter Date: 2024   Encounter department: [facility]    Assessment & Plan     Reviewed labs, EKG.  Cleared for THR.          Chief Complaint   Patient presents with    Pre-op Exam     Scheduled for Right Hip Total Arthroplasty 3/12/24 under Dr. Yovani Solis      1. Pre-op exam    2. Right hip pain    3. Johnston's esophagus with dysplasia    4. Essential hypertension    5. Current mild episode of major depressive disorder without prior episode (HCC)    6. Breast cancer, stage 1, right (HCC)           Subjective     Pre op, right THR .  Tired of the right hip pain.  PMH: neg CAD or blood disorders.  Reviewed labs, got [place] [facility] [place]  PMH, PSH: Reviewed.      Review of Systems   Constitutional: Negative.    HENT: Negative.     Eyes: Negative.    Respiratory: Negative.  Negative for shortness of breath.    Cardiovascular: Negative.  Negative for chest pain.   Gastrointestinal: Negative.    Genitourinary: Negative.    Musculoskeletal:  Positive for arthralgias.   Skin: Negative.    Neurological: Negative.    Psychiatric/Behavioral: Negative.         Past Medical History:   Diagnosis Date    Allergic     Anxiety     Arthritis     Johnston's esophagus 2020    Bulging discs     Cancer (HCC) Breast    R breast    Chronic interstitial cystitis     Colon polyp     Diverticulosis     Elevated amylase     Last Assessed:  3/24/15    Fibromyalgia, primary     GERD (gastroesophageal reflux disease)     Hyperlipidemia     Hypertension     Irritable bowel syndrome     Migraine     Pelvic floor dysfunction     Pneumonia     Psychiatric disorder     Ribs, multiple fractures     2019    Serum lipase elevation     Last Assessed:  13    Vitamin D deficiency      Past Surgical History:   Procedure Laterality Date    BAND HEMORRHOIDECTOMY      internal    BREAST LUMPECTOMY Right 10/2016     "CHOLECYSTECTOMY      COLONOSCOPY      2016, \"very difficult exam\" per H&P    TONSILLECTOMY      TOOTH EXTRACTION      TOTAL ABDOMINAL HYSTERECTOMY W/ BILATERAL SALPINGOOPHORECTOMY  2021    TUBAL LIGATION       Family History   Problem Relation Age of Onset    Hypertension Mother     Heart disease Father     Hypertension Father     Stroke Father         Syndrome     Social History     Socioeconomic History    Marital status: /Civil Union     Spouse name: None    Number of children: None    Years of education: None    Highest education level: None   Occupational History    Occupation: Retired   Tobacco Use    Smoking status: Former     Current packs/day: 0.00     Average packs/day: 0.5 packs/day for 27.0 years (13.5 ttl pk-yrs)     Types: Cigarettes     Start date:      Quit date:      Years since quittin.1    Smokeless tobacco: Never   Vaping Use    Vaping status: Never Used   Substance and Sexual Activity    Alcohol use: Never     Comment: social    Drug use: No     Comment: CBD oil with tea at night    Sexual activity: Not Currently   Other Topics Concern    None   Social History Narrative    Uses safety equipment     Social Determinants of Health     Financial Resource Strain: Patient Declined (2023)    Overall Financial Resource Strain (CARDIA)     Difficulty of Paying Living Expenses: Patient declined   Food Insecurity: Not on file   Transportation Needs: No Transportation Needs (2023)    PRAPARE - Transportation     Lack of Transportation (Medical): No     Lack of Transportation (Non-Medical): No   Physical Activity: Not on file   Stress: Not on file   Social Connections: Not on file   Intimate Partner Violence: Not on file   Housing Stability: Not on file     Current Outpatient Medications on File Prior to Visit   Medication Sig    acetaminophen (TYLENOL) 650 mg CR tablet Take 650 mg by mouth every 8 (eight) hours as needed    amLODIPine (NORVASC) 5 mg tablet Take 1 tablet " by mouth daily    benazepril (LOTENSIN) 40 MG tablet Take 1 tablet by mouth daily    citalopram (CeleXA) 20 mg tablet Take 1 tablet (20 mg total) by mouth daily (Patient taking differently: Take 10 mg by mouth daily)    Lactobacillus Rhamnosus, GG, (Culturelle Kids) CHEW Chew 1 tablet every evening    Lactobacillus TABS Take 1 tablet by mouth every evening    LORazepam (ATIVAN) 1 mg tablet Take 1 tablet (1 mg total) by mouth 2 (two) times a day as needed for anxiety    methylPREDNISolone 4 MG tablet therapy pack Use as directed on package    metoprolol succinate (Toprol XL) 25 mg 24 hr tablet Take 1 tablet (25 mg total) by mouth daily    omeprazole (PriLOSEC) 20 mg delayed release capsule Take 20 mg by mouth daily TAKEN EVERY OTHER DAY     rosuvastatin (CRESTOR) 5 mg tablet Take 5 mg by mouth 3 (three) times a week    famotidine (PEPCID) 20 mg tablet  (Patient not taking: Reported on 11/6/2023)    methylPREDNISolone 4 MG tablet therapy pack Use as directed on package     Allergies   Allergen Reactions    Amoxicillin Other (See Comments)     C-Diff    Bactrim [Sulfamethoxazole-Trimethoprim]     Doxycycline GI Intolerance    Lyrica [Pregabalin]     Pneumovax 23 [Pneumococcal Vac Polyvalent]     Pollen Extract     Prevnar 13 [Pneumococcal 13-Deandra Conj Vacc] Swelling    Sertraline Chest Pain    Trazodone Other (See Comments)     Dry mouth     Zithromax [Azithromycin]      Immunization History   Administered Date(s) Administered    COVID-19 PFIZER VACCINE 0.3 ML IM 02/23/2021, 03/14/2021, 10/05/2021, 10/04/2022, 10/04/2022    COVID-19 Pfizer vac 5-11y esme-sucrose 0.2 mL IM (orange cap) 04/01/2022    INFLUENZA 12/09/2014, 10/05/2015, 10/05/2015, 10/04/2016, 10/11/2019, 10/01/2020    Influenza Quadrivalent 3 years and older 10/01/2020    Influenza Split High Dose Preservative Free IM 10/04/2016    Influenza, high dose seasonal 0.7 mL 10/11/2019    Rabies, Intramuscular 03/17/2016, 03/20/2016    Tdap 07/12/2021  "      Objective     /82 (BP Location: Left arm, Patient Position: Sitting, Cuff Size: Standard)   Pulse 62   Temp 97.7 °F (36.5 °C) (Oral)   Resp 18   Ht 5' 4\" (1.626 m)   Wt 57 kg (125 lb 9.6 oz)   LMP  (LMP Unknown)   SpO2 97%   BMI 21.56 kg/m²     Physical Exam  Constitutional:       Appearance: She is well-developed.   HENT:      Head: Normocephalic and atraumatic.      Right Ear: Tympanic membrane, ear canal and external ear normal.      Left Ear: Tympanic membrane, ear canal and external ear normal.      Nose: Nose normal.      Mouth/Throat:      Mouth: Mucous membranes are moist.      Pharynx: Oropharynx is clear.   Eyes:      Conjunctiva/sclera: Conjunctivae normal.      Pupils: Pupils are equal, round, and reactive to light.   Cardiovascular:      Rate and Rhythm: Normal rate and regular rhythm.      Pulses: Normal pulses.      Heart sounds: Normal heart sounds.   Pulmonary:      Effort: Pulmonary effort is normal.      Breath sounds: Normal breath sounds.   Abdominal:      General: Abdomen is flat. Bowel sounds are normal.      Palpations: Abdomen is soft.   Musculoskeletal:      Cervical back: Normal range of motion and neck supple.   Skin:     General: Skin is warm and dry.   Neurological:      Mental Status: She is alert and oriented to person, place, and time.      Deep Tendon Reflexes: Reflexes are normal and symmetric.   Psychiatric:         Mood and Affect: Mood normal.         Behavior: Behavior normal.         Thought Content: Thought content normal.         Judgment: Judgment normal.       Baljit Bah, DO    "

## 2024-05-13 ENCOUNTER — TELEPHONE (OUTPATIENT)
Age: 79
End: 2024-05-13

## 2024-05-13 DIAGNOSIS — G47.00 INSOMNIA, UNSPECIFIED TYPE: Primary | ICD-10-CM

## 2024-05-13 RX ORDER — TRAZODONE HYDROCHLORIDE 50 MG/1
50 TABLET ORAL
Qty: 30 TABLET | Refills: 0 | Status: SHIPPED | OUTPATIENT
Start: 2024-05-13

## 2024-05-13 NOTE — TELEPHONE ENCOUNTER
Patient asking for a trazodone prescription to help her sleep. States she's been having some difficulties.

## 2024-05-14 NOTE — TELEPHONE ENCOUNTER
Called patient let her know that script was sent in patient verbalize understanding and will start medication today.

## 2024-06-18 ENCOUNTER — RA CDI HCC (OUTPATIENT)
Dept: OTHER | Facility: HOSPITAL | Age: 79
End: 2024-06-18

## 2024-06-26 ENCOUNTER — OFFICE VISIT (OUTPATIENT)
Dept: FAMILY MEDICINE CLINIC | Facility: CLINIC | Age: 79
End: 2024-06-26
Payer: MEDICARE

## 2024-06-26 VITALS
HEART RATE: 65 BPM | OXYGEN SATURATION: 98 % | WEIGHT: 121 LBS | BODY MASS INDEX: 20.66 KG/M2 | TEMPERATURE: 97.7 F | HEIGHT: 64 IN | SYSTOLIC BLOOD PRESSURE: 150 MMHG | DIASTOLIC BLOOD PRESSURE: 96 MMHG

## 2024-06-26 DIAGNOSIS — F33.0 MILD EPISODE OF RECURRENT MAJOR DEPRESSIVE DISORDER (HCC): ICD-10-CM

## 2024-06-26 DIAGNOSIS — S39.013A STRAIN OF MUSCLE, FASCIA AND TENDON OF PELVIS, INITIAL ENCOUNTER: ICD-10-CM

## 2024-06-26 DIAGNOSIS — M99.03 SEGMENTAL AND SOMATIC DYSFUNCTION OF LUMBAR REGION: ICD-10-CM

## 2024-06-26 DIAGNOSIS — F41.9 ANXIETY: ICD-10-CM

## 2024-06-26 DIAGNOSIS — S39.012A STRAIN, SACRAL, INITIAL ENCOUNTER: ICD-10-CM

## 2024-06-26 DIAGNOSIS — M99.05 PELVIC SOMATIC DYSFUNCTION: ICD-10-CM

## 2024-06-26 DIAGNOSIS — M99.04 SACRAL REGION SOMATIC DYSFUNCTION: ICD-10-CM

## 2024-06-26 DIAGNOSIS — M54.40 ACUTE BILATERAL LOW BACK PAIN WITH SCIATICA, SCIATICA LATERALITY UNSPECIFIED: Primary | ICD-10-CM

## 2024-06-26 DIAGNOSIS — S39.012A LUMBAR STRAIN, INITIAL ENCOUNTER: ICD-10-CM

## 2024-06-26 PROCEDURE — 99214 OFFICE O/P EST MOD 30 MIN: CPT | Performed by: FAMILY MEDICINE

## 2024-06-26 PROCEDURE — 98926 OSTEOPATH MANJ 3-4 REGIONS: CPT | Performed by: FAMILY MEDICINE

## 2024-06-26 RX ORDER — CITALOPRAM 20 MG/1
10 TABLET ORAL DAILY
Qty: 90 TABLET | Refills: 1 | Status: SHIPPED | OUTPATIENT
Start: 2024-06-26

## 2024-06-26 RX ORDER — LORAZEPAM 1 MG/1
1 TABLET ORAL
Qty: 30 TABLET | Refills: 5 | Status: SHIPPED | OUTPATIENT
Start: 2024-06-26

## 2024-06-26 NOTE — PROGRESS NOTES
OMT    Performed by: Baljit Bah DO  Authorized by: Baljit Bah DO  Universal Protocol:  Consent: Verbal consent obtained.  Consent given by: patient      Procedure Details:     Region evaluated and treated:  Lumbar, Sacrum/Pelvis and Pelvis Innominate    Lumbar details:     Examination Method:  Asymmetry, Misalignment, Crepitation, Defects, Masses and Tenderness, Pain    Severity:  Moderate    Treatment Method:  Muscle Energy Treatment    Response:  Improved - The somatic dysfunction is improved but not completely resolved.    Sacrum/Pelvis details:     Examination Method:  Asymmetry, Misalignment, Crepitation, Defects, Masses and Tenderness, Pain    Severity:  Moderate    Treatment Method:  Muscle Energy Treatment    Response:  Improved - The somatic dysfunction is improved but not completely resolved.    Pelvis Innominate details:     Examination Method:  Asymmetry, Misalignment, Crepitation, Defects, Masses and Tenderness, Pain    Severity:  Moderate    Treatment Method:  Muscle Energy Treatment    Response:  Improved - The somatic dysfunction is improved but not completely resolved.    Total Regions Treated:  3    Ambulatory Visit  Name: Karen Strange      : 1945      MRN: [id number]  Encounter Provider: Baljit Bah DO  Encounter Date: 2024   Encounter department: [facility]    Assessment & Plan   1. Acute bilateral low back pain with sciatica, sciatica laterality unspecified  2. Mild episode of recurrent major depressive disorder (HCC)  -     citalopram (CeleXA) 20 mg tablet; Take 0.5 tablets (10 mg total) by mouth daily  3. Anxiety  -     citalopram (CeleXA) 20 mg tablet; Take 0.5 tablets (10 mg total) by mouth daily  -     LORazepam (ATIVAN) 1 mg tablet; Take 1 tablet (1 mg total) by mouth daily at bedtime as needed for anxiety  4. Lumbar strain, initial encounter  5. Segmental and somatic dysfunction of lumbar region  6. Sacral region somatic dysfunction  7. Strain,  "sacral, initial encounter  8. Pelvic somatic dysfunction  9. Strain of muscle, fascia and tendon of pelvis, initial encounter       Chief Complaint   Patient presents with    6 months follow up        History of Present Illness     Low back pain, started after getting off the floor.  Has appt with ortho.      Review of Systems   Constitutional: Negative.    HENT: Negative.     Eyes: Negative.    Respiratory: Negative.     Cardiovascular: Negative.    Gastrointestinal: Negative.    Genitourinary: Negative.    Musculoskeletal:  Positive for back pain.   Skin: Negative.    Neurological: Negative.    Psychiatric/Behavioral: Negative.       Past Medical History:   Diagnosis Date    Allergic     Anxiety     Arthritis     Johnston's esophagus 09/2020    Bulging discs     Cancer (HCC) Breast    R breast    Chronic interstitial cystitis     Colon polyp     Diverticulosis     Elevated amylase     Last Assessed:  3/24/15    Fibromyalgia, primary     GERD (gastroesophageal reflux disease)     Hyperlipidemia     Hypertension     Irritable bowel syndrome     Migraine     Pelvic floor dysfunction     Pneumonia     Psychiatric disorder     Ribs, multiple fractures     2/2019    Serum lipase elevation     Last Assessed:  9/17/13    Vitamin D deficiency      Past Surgical History:   Procedure Laterality Date    BAND HEMORRHOIDECTOMY      internal    BREAST LUMPECTOMY Right 10/2016    CHOLECYSTECTOMY      COLONOSCOPY      2016, \"very difficult exam\" per H&P    TONSILLECTOMY      TOOTH EXTRACTION      TOTAL ABDOMINAL HYSTERECTOMY W/ BILATERAL SALPINGOOPHORECTOMY  08/04/2021    TUBAL LIGATION       Family History   Problem Relation Age of Onset    Hypertension Mother     Heart disease Father     Hypertension Father     Stroke Father         Syndrome     Social History     Tobacco Use    Smoking status: Former     Current packs/day: 0.00     Average packs/day: 0.5 packs/day for 27.0 years (13.5 ttl pk-yrs)     Types: Cigarettes     Start " date:      Quit date:      Years since quittin.5    Smokeless tobacco: Never   Vaping Use    Vaping status: Never Used   Substance and Sexual Activity    Alcohol use: Never     Comment: social    Drug use: No     Comment: CBD oil with tea at night    Sexual activity: Not Currently     Current Outpatient Medications on File Prior to Visit   Medication Sig    acetaminophen (TYLENOL) 650 mg CR tablet Take 650 mg by mouth every 8 (eight) hours as needed    amLODIPine (NORVASC) 5 mg tablet Take 1 tablet by mouth daily    benazepril (LOTENSIN) 40 MG tablet Take 1 tablet by mouth daily    famotidine (PEPCID) 20 mg tablet     Lactobacillus Rhamnosus, GG, (Culturelle Kids) CHEW Chew 1 tablet every evening    Lactobacillus TABS Take 1 tablet by mouth every evening    methylPREDNISolone 4 MG tablet therapy pack Use as directed on package    methylPREDNISolone 4 MG tablet therapy pack Use as directed on package    metoprolol succinate (Toprol XL) 25 mg 24 hr tablet Take 1 tablet (25 mg total) by mouth daily    omeprazole (PriLOSEC) 20 mg delayed release capsule Take 20 mg by mouth daily TAKEN EVERY OTHER DAY     rosuvastatin (CRESTOR) 5 mg tablet Take 5 mg by mouth 3 (three) times a week     Allergies   Allergen Reactions    Amoxicillin Other (See Comments)     C-Diff    Bactrim [Sulfamethoxazole-Trimethoprim]     Doxycycline GI Intolerance    Lyrica [Pregabalin]     Pneumovax 23 [Pneumococcal Vac Polyvalent]     Pollen Extract     Prevnar 13 [Pneumococcal 13-Deandra Conj Vacc] Swelling    Sertraline Chest Pain    Trazodone Other (See Comments)     Dry mouth     Zithromax [Azithromycin]      Immunization History   Administered Date(s) Administered    COVID-19 PFIZER VACCINE 0.3 ML IM 2021, 2021, 10/05/2021, 10/04/2022, 10/04/2022    COVID-19 Pfizer vac 5-11y esme-sucrose 0.2 mL IM (orange cap) 2022    INFLUENZA 2014, 10/05/2015, 10/05/2015, 10/04/2016, 10/11/2019, 10/01/2020    Influenza  "Quadrivalent 3 years and older 10/01/2020    Influenza Split High Dose Preservative Free IM 10/04/2016    Influenza, high dose seasonal 0.7 mL 10/11/2019    Rabies, Intramuscular 03/17/2016, 03/20/2016    Tdap 07/12/2021     Objective     /96 (BP Location: Left arm, Patient Position: Sitting, Cuff Size: Standard)   Pulse 65   Temp 97.7 °F (36.5 °C) (Temporal)   Ht 5' 4\" (1.626 m)   Wt 54.9 kg (121 lb)   LMP  (LMP Unknown)   SpO2 98%   BMI 20.77 kg/m²     Physical Exam  Constitutional:       Appearance: She is well-developed.   HENT:      Head: Normocephalic and atraumatic.      Right Ear: External ear normal.      Left Ear: External ear normal.      Nose: Nose normal.   Eyes:      Conjunctiva/sclera: Conjunctivae normal.      Pupils: Pupils are equal, round, and reactive to light.   Cardiovascular:      Rate and Rhythm: Normal rate and regular rhythm.      Heart sounds: Normal heart sounds.   Pulmonary:      Effort: Pulmonary effort is normal.      Breath sounds: Normal breath sounds.   Abdominal:      General: Bowel sounds are normal.      Palpations: Abdomen is soft.   Musculoskeletal:      Cervical back: Normal range of motion and neck supple.      Comments: Stand: Inferior left innominate.  Prone:  Inferior L psis, sacrum: SB left, rotated left.  Low lumbar rotated right.  Supine: R pube little superior.   Skin:     General: Skin is warm and dry.   Neurological:      Mental Status: She is alert and oriented to person, place, and time.      Deep Tendon Reflexes: Reflexes are normal and symmetric.   Psychiatric:         Mood and Affect: Mood normal.         Behavior: Behavior normal.         Thought Content: Thought content normal.         Judgment: Judgment normal.       Administrative Statements         "

## 2024-07-11 ENCOUNTER — TRANSITIONAL CARE MANAGEMENT (OUTPATIENT)
Dept: FAMILY MEDICINE CLINIC | Facility: CLINIC | Age: 79
End: 2024-07-11

## 2024-07-11 ENCOUNTER — TELEPHONE (OUTPATIENT)
Age: 79
End: 2024-07-11

## 2024-07-11 NOTE — TELEPHONE ENCOUNTER
Pt called to cancel her 7/12 TCM appt with Dr. Bah. Pt stated that she received a call stating that her Urine test came back + for a UTI and they called in an anti-biotic for her, so the appointment is not needed.    Attempted to explain to pt that the appointment was a TCM appt and not associated with her UTI.     Made several attempts to explain the importance of the TCM appt following a hospitalization and pt declined to keep appt stating that she will see Dr. Bah in 4 months.    Appointment cancelled at pt request.

## 2024-09-09 ENCOUNTER — OFFICE VISIT (OUTPATIENT)
Dept: FAMILY MEDICINE CLINIC | Facility: CLINIC | Age: 79
End: 2024-09-09
Payer: COMMERCIAL

## 2024-09-09 VITALS
SYSTOLIC BLOOD PRESSURE: 124 MMHG | DIASTOLIC BLOOD PRESSURE: 76 MMHG | WEIGHT: 120 LBS | TEMPERATURE: 98.4 F | OXYGEN SATURATION: 97 % | HEART RATE: 83 BPM | BODY MASS INDEX: 20.49 KG/M2 | HEIGHT: 64 IN

## 2024-09-09 DIAGNOSIS — R30.9 PAIN WITH URINATION: ICD-10-CM

## 2024-09-09 DIAGNOSIS — R30.0 BURNING WITH URINATION: Primary | ICD-10-CM

## 2024-09-09 DIAGNOSIS — E44.1 MILD PROTEIN-CALORIE MALNUTRITION (HCC): ICD-10-CM

## 2024-09-09 DIAGNOSIS — R39.15 URGENCY OF URINATION: ICD-10-CM

## 2024-09-09 LAB
SL AMB  POCT GLUCOSE, UA: NEGATIVE
SL AMB LEUKOCYTE ESTERASE,UA: ABNORMAL
SL AMB POCT BILIRUBIN,UA: NEGATIVE
SL AMB POCT BLOOD,UA: ABNORMAL
SL AMB POCT CLARITY,UA: ABNORMAL
SL AMB POCT COLOR,UA: YELLOW
SL AMB POCT KETONES,UA: NEGATIVE
SL AMB POCT NITRITE,UA: NEGATIVE
SL AMB POCT PH,UA: 6
SL AMB POCT SPECIFIC GRAVITY,UA: 1.01
SL AMB POCT URINE PROTEIN: ABNORMAL
SL AMB POCT UROBILINOGEN: NEGATIVE

## 2024-09-09 PROCEDURE — 99213 OFFICE O/P EST LOW 20 MIN: CPT | Performed by: FAMILY MEDICINE

## 2024-09-09 PROCEDURE — 81003 URINALYSIS AUTO W/O SCOPE: CPT | Performed by: FAMILY MEDICINE

## 2024-09-09 RX ORDER — TIZANIDINE HYDROCHLORIDE 2 MG/1
2 CAPSULE, GELATIN COATED ORAL 3 TIMES DAILY
COMMUNITY

## 2024-09-09 RX ORDER — CIPROFLOXACIN 250 MG/1
250 TABLET, FILM COATED ORAL EVERY 12 HOURS SCHEDULED
Qty: 10 TABLET | Refills: 0 | Status: SHIPPED | OUTPATIENT
Start: 2024-09-09 | End: 2024-09-14

## 2024-09-09 RX ORDER — ERGOCALCIFEROL 1.25 MG/1
1 CAPSULE ORAL WEEKLY
COMMUNITY
Start: 2024-08-07 | End: 2024-10-30

## 2024-09-09 RX ORDER — TRAMADOL HYDROCHLORIDE 50 MG/1
50 TABLET ORAL EVERY 6 HOURS PRN
COMMUNITY

## 2024-09-09 NOTE — PROGRESS NOTES
"Ambulatory Visit  Name: Karen Strange      : 1945      MRN: [id number]  Encounter Provider: Baljit Bah DO  Encounter Date: 2024   Encounter department: [facility]    Chief Complaint   Patient presents with    Possible UTI    Groin Pain       Assessment & Plan   1. Burning with urination  -     POCT urine dip auto non-scope  2. Pain with urination  -     POCT urine dip auto non-scope  -     ciprofloxacin (CIPRO) 250 mg tablet; Take 1 tablet (250 mg total) by mouth every 12 (twelve) hours for 5 days  3. Urgency of urination  -     ciprofloxacin (CIPRO) 250 mg tablet; Take 1 tablet (250 mg total) by mouth every 12 (twelve) hours for 5 days  4. Mild protein-calorie malnutrition (HCC)       History of Present Illness     Dysuria, urgency, frequency started few days ago.  Has pubic bone discomfort.  Having diarrhea lately.        Review of Systems   Constitutional: Negative.    HENT: Negative.     Eyes: Negative.    Respiratory: Negative.     Cardiovascular: Negative.    Gastrointestinal: Negative.    Genitourinary:  Positive for dysuria and frequency.   Musculoskeletal: Negative.    Skin: Negative.    Neurological: Negative.    Psychiatric/Behavioral: Negative.         Objective     /76 (BP Location: Left arm, Patient Position: Sitting, Cuff Size: Standard)   Pulse 83   Temp 98.4 °F (36.9 °C) (Temporal)   Ht 5' 4\" (1.626 m)   Wt 54.4 kg (120 lb)   LMP  (LMP Unknown)   SpO2 97%   BMI 20.60 kg/m²     Physical Exam  Constitutional:       Appearance: She is well-developed.   HENT:      Head: Normocephalic and atraumatic.      Right Ear: External ear normal.      Left Ear: External ear normal.      Nose: Nose normal.      Mouth/Throat:      Mouth: Mucous membranes are moist.      Pharynx: Oropharynx is clear.   Eyes:      Conjunctiva/sclera: Conjunctivae normal.      Pupils: Pupils are equal, round, and reactive to light.   Cardiovascular:      Rate and Rhythm: Normal rate and " regular rhythm.      Heart sounds: Normal heart sounds.   Pulmonary:      Effort: Pulmonary effort is normal.      Breath sounds: Normal breath sounds.   Abdominal:      General: Bowel sounds are normal.      Palpations: Abdomen is soft.      Tenderness: There is abdominal tenderness.      Comments: Suprapubic tenderness - pt states tenderness is from the pubic fracture.   Musculoskeletal:      Cervical back: Normal range of motion and neck supple.   Skin:     General: Skin is warm and dry.   Neurological:      Mental Status: She is alert and oriented to person, place, and time.      Deep Tendon Reflexes: Reflexes are normal and symmetric.   Psychiatric:         Mood and Affect: Mood normal.         Behavior: Behavior normal.         Thought Content: Thought content normal.         Judgment: Judgment normal.       Administrative Statements

## 2024-11-20 RX ORDER — CALCIUM CARBONATE 500 MG/1
1 TABLET, CHEWABLE ORAL
COMMUNITY

## 2024-11-20 RX ORDER — CELECOXIB 200 MG/1
200 CAPSULE ORAL DAILY
COMMUNITY
Start: 2024-08-06 | End: 2024-11-20

## 2024-11-20 RX ORDER — AMOXICILLIN 250 MG
1 CAPSULE ORAL 2 TIMES DAILY
COMMUNITY
Start: 2024-03-12 | End: 2025-03-12

## 2024-11-21 ENCOUNTER — OFFICE VISIT (OUTPATIENT)
Dept: FAMILY MEDICINE CLINIC | Facility: CLINIC | Age: 79
End: 2024-11-21

## 2024-11-21 VITALS
RESPIRATION RATE: 18 BRPM | TEMPERATURE: 97.6 F | HEART RATE: 72 BPM | WEIGHT: 124.8 LBS | DIASTOLIC BLOOD PRESSURE: 82 MMHG | BODY MASS INDEX: 21.31 KG/M2 | OXYGEN SATURATION: 98 % | HEIGHT: 64 IN | SYSTOLIC BLOOD PRESSURE: 144 MMHG

## 2024-11-21 DIAGNOSIS — F33.0 MILD EPISODE OF RECURRENT MAJOR DEPRESSIVE DISORDER (HCC): Primary | ICD-10-CM

## 2024-11-21 DIAGNOSIS — F41.9 ANXIETY: ICD-10-CM

## 2024-11-21 RX ORDER — LORAZEPAM 1 MG/1
1 TABLET ORAL 2 TIMES DAILY
Qty: 60 TABLET | Refills: 0 | Status: SHIPPED | OUTPATIENT
Start: 2024-11-21

## 2024-11-21 RX ORDER — MESALAMINE 1000 MG/1
1000 SUPPOSITORY RECTAL
COMMUNITY
Start: 2024-09-18

## 2024-11-21 RX ORDER — CITALOPRAM HYDROBROMIDE 20 MG/1
20 TABLET ORAL DAILY
Qty: 90 TABLET | Refills: 3 | Status: SHIPPED | OUTPATIENT
Start: 2024-11-21

## 2024-11-21 NOTE — PROGRESS NOTES
Name: Karen Strange      : 1945      MRN: [id number]  Encounter Provider: Domenic Owen DO  Encounter Date: 2024   Encounter department: [facility]  :  Assessment & Plan  Mild episode of recurrent major depressive disorder (HCC)  Depression Screening Follow-up Plan: Patient's depression screening was positive with a PHQ-9 score of 13. Patient advised to follow-up with PCP for further management.    Orders:    citalopram (CeleXA) 20 mg tablet; Take 1 tablet (20 mg total) by mouth daily    LORazepam (ATIVAN) 1 mg tablet; Take 1 tablet (1 mg total) by mouth 2 (two) times a day    Anxiety    Orders:    citalopram (CeleXA) 20 mg tablet; Take 1 tablet (20 mg total) by mouth daily    LORazepam (ATIVAN) 1 mg tablet; Take 1 tablet (1 mg total) by mouth 2 (two) times a day           History of Present Illness     No SI/HI.    Anxiety  Presents for follow-up visit. Patient reports no chest pain, palpitations or shortness of breath. The severity of symptoms is causing significant distress, interfering with daily activities and severe. The quality of sleep is fair.       Depression  This is a chronic problem. The current episode started more than 1 year ago. The problem occurs daily. The problem has been waxing and waning. Pertinent negatives include no abdominal pain, arthralgias, chest pain, chills, coughing, fever, rash, sore throat or vomiting.     Review of Systems   Constitutional:  Negative for chills and fever.   HENT:  Negative for ear pain and sore throat.    Eyes:  Negative for pain and visual disturbance.   Respiratory:  Negative for cough and shortness of breath.    Cardiovascular:  Negative for chest pain and palpitations.   Gastrointestinal:  Negative for abdominal pain and vomiting.   Genitourinary:  Negative for dysuria and hematuria.   Musculoskeletal:  Negative for arthralgias and back pain.   Skin:  Negative for color change and rash.   Neurological:  Negative for seizures and  syncope.   Psychiatric/Behavioral:  Positive for depression, dysphoric mood and sleep disturbance.    All other systems reviewed and are negative.    Medical History Reviewed by provider this encounter:  Tobacco  Allergies  Meds  Problems  Med Hx  Surg Hx  Fam Hx     .  Current Outpatient Medications on File Prior to Visit   Medication Sig Dispense Refill    Acetaminophen 500 MG Take 1,000 mg by mouth every 6 (six) hours as needed for mild pain      amLODIPine (NORVASC) 5 mg tablet Take 1 tablet by mouth daily      benazepril (LOTENSIN) 40 MG tablet Take 1 tablet by mouth daily      calcium carbonate (Tums) 500 mg chewable tablet Chew 1 tablet daily at bedtime as needed for indigestion      Lactobacillus TABS Take 1 tablet by mouth every evening      Lidocaine 4 % LOTN Apply 1 Application topically daily      LORazepam (ATIVAN) 1 mg tablet Take 1 tablet (1 mg total) by mouth daily at bedtime as needed for anxiety 30 tablet 5    mesalamine (CANASA) 1,000 mg suppository Insert 1,000 mg into the rectum daily at bedtime      metoprolol succinate (Toprol XL) 25 mg 24 hr tablet Take 1 tablet (25 mg total) by mouth daily 90 tablet 1    omeprazole (PriLOSEC) 20 mg delayed release capsule Take 20 mg by mouth daily TAKEN EVERY OTHER DAY       rosuvastatin (CRESTOR) 5 mg tablet Take 5 mg by mouth 3 (three) times a week      senna-docusate sodium (SENOKOT S) 8.6-50 mg per tablet Take 1 tablet by mouth 2 (two) times a day      Lactobacillus Rhamnosus, GG, (Culturelle Kids) CHEW Chew 1 tablet every evening      methylPREDNISolone 4 MG tablet therapy pack Use as directed on package 21 each 0    methylPREDNISolone 4 MG tablet therapy pack Use as directed on package 21 each 0    TiZANidine (ZANAFLEX) 2 MG capsule Take 2 mg by mouth 3 (three) times a day (Patient not taking: Reported on 11/21/2024)      [DISCONTINUED] citalopram (CeleXA) 20 mg tablet Take 0.5 tablets (10 mg total) by mouth daily (Patient not taking: Reported  "on 2024) 90 tablet 1     No current facility-administered medications on file prior to visit.      Social History     Tobacco Use    Smoking status: Former     Current packs/day: 0.00     Average packs/day: 0.5 packs/day for 27.0 years (13.5 ttl pk-yrs)     Types: Cigarettes     Start date:      Quit date:      Years since quittin.9    Smokeless tobacco: Never   Vaping Use    Vaping status: Never Used   Substance and Sexual Activity    Alcohol use: Never     Comment: social    Drug use: No     Comment: CBD oil with tea at night    Sexual activity: Not Currently        Objective   /82 (BP Location: Left arm, Patient Position: Sitting, Cuff Size: Standard)   Pulse 72   Temp 97.6 °F (36.4 °C) (Temporal)   Resp 18   Ht 5' 4\" (1.626 m)   Wt 56.6 kg (124 lb 12.8 oz)   LMP  (LMP Unknown)   SpO2 98%   BMI 21.42 kg/m²      Physical Exam  Vitals reviewed.   Constitutional:       General: She is not in acute distress.     Appearance: Normal appearance. She is well-developed.   HENT:      Head: Normocephalic and atraumatic.      Nose: Nose normal. No congestion or rhinorrhea.      Mouth/Throat:      Mouth: Mucous membranes are moist.      Pharynx: Oropharynx is clear. No oropharyngeal exudate or posterior oropharyngeal erythema.   Eyes:      Conjunctiva/sclera: Conjunctivae normal.   Cardiovascular:      Rate and Rhythm: Normal rate and regular rhythm.      Pulses: Normal pulses.      Heart sounds: Normal heart sounds. No murmur heard.  Pulmonary:      Effort: Pulmonary effort is normal. No respiratory distress.      Breath sounds: Normal breath sounds.   Abdominal:      Palpations: Abdomen is soft.      Tenderness: There is no abdominal tenderness.   Musculoskeletal:         General: No swelling.      Cervical back: Neck supple.   Skin:     General: Skin is warm and dry.      Capillary Refill: Capillary refill takes less than 2 seconds.   Neurological:      Mental Status: She is alert and " oriented to person, place, and time.   Psychiatric:         Mood and Affect: Mood normal.      Comments: Tearful during exam       Administrative Statements   I have spent a total time of 43 minutes in caring for this patient on the day of the visit/encounter including Diagnostic results, Prognosis, Risks and benefits of tx options, Instructions for management, Impressions, Counseling / Coordination of care, Documenting in the medical record, Reviewing / ordering tests, medicine, procedures  , and Obtaining or reviewing history  .     Depression Screening Follow-up Plan: Patient's depression screening was positive with a PHQ-2 score of . Their PHQ-9 score was 13. Patient advised to follow-up with PCP for further management.

## 2024-12-12 ENCOUNTER — RA CDI HCC (OUTPATIENT)
Dept: OTHER | Facility: HOSPITAL | Age: 79
End: 2024-12-12

## 2024-12-19 ENCOUNTER — OFFICE VISIT (OUTPATIENT)
Dept: FAMILY MEDICINE CLINIC | Facility: CLINIC | Age: 79
End: 2024-12-19

## 2024-12-19 VITALS
DIASTOLIC BLOOD PRESSURE: 77 MMHG | OXYGEN SATURATION: 98 % | WEIGHT: 122.6 LBS | HEART RATE: 63 BPM | HEIGHT: 64 IN | BODY MASS INDEX: 20.93 KG/M2 | TEMPERATURE: 97.4 F | SYSTOLIC BLOOD PRESSURE: 165 MMHG

## 2024-12-19 DIAGNOSIS — F41.9 ANXIETY: ICD-10-CM

## 2024-12-19 DIAGNOSIS — F33.0 MILD EPISODE OF RECURRENT MAJOR DEPRESSIVE DISORDER (HCC): Primary | ICD-10-CM

## 2024-12-19 RX ORDER — ESCITALOPRAM OXALATE 5 MG/1
5 TABLET ORAL DAILY
Qty: 90 TABLET | Refills: 0 | Status: SHIPPED | OUTPATIENT
Start: 2024-12-19

## 2024-12-19 RX ORDER — LORAZEPAM 1 MG/1
1 TABLET ORAL 2 TIMES DAILY
Qty: 60 TABLET | Refills: 0 | Status: SHIPPED | OUTPATIENT
Start: 2024-12-19

## 2024-12-19 NOTE — ASSESSMENT & PLAN NOTE
Side effects with celexa.  Start lexapro 5mg, f/u 1 month.   Orders:  •  LORazepam (ATIVAN) 1 mg tablet; Take 1 tablet (1 mg total) by mouth 2 (two) times a day  •  escitalopram (LEXAPRO) 5 mg tablet; Take 1 tablet (5 mg total) by mouth daily

## 2024-12-19 NOTE — ASSESSMENT & PLAN NOTE
Controlled on ativan, refill. Reviewed PDMP  Orders:  •  LORazepam (ATIVAN) 1 mg tablet; Take 1 tablet (1 mg total) by mouth 2 (two) times a day  •  escitalopram (LEXAPRO) 5 mg tablet; Take 1 tablet (5 mg total) by mouth daily

## 2024-12-19 NOTE — PROGRESS NOTES
Name: Karen Strange      : 1945      MRN: [id number]  Encounter Provider: Domenic Owen DO  Encounter Date: 2024   Encounter department: [facility]  :  Assessment & Plan  Mild episode of recurrent major depressive disorder (HCC)  Side effects with celexa.  Start lexapro 5mg, f/u 1 month.   Orders:  •  LORazepam (ATIVAN) 1 mg tablet; Take 1 tablet (1 mg total) by mouth 2 (two) times a day  •  escitalopram (LEXAPRO) 5 mg tablet; Take 1 tablet (5 mg total) by mouth daily    Anxiety  Controlled on ativan, refill. Reviewed PDMP  Orders:  •  LORazepam (ATIVAN) 1 mg tablet; Take 1 tablet (1 mg total) by mouth 2 (two) times a day  •  escitalopram (LEXAPRO) 5 mg tablet; Take 1 tablet (5 mg total) by mouth daily           History of Present Illness     Side effects with celexa. Starting lexapro now. BP controlled.    Refill ativan.    Anxiety  Presents for follow-up visit. Symptoms include excessive worry, irritability, nervous/anxious behavior and panic. Patient reports no chest pain, palpitations or shortness of breath. The severity of symptoms is moderate and interfering with daily activities. The quality of sleep is fair. Nighttime awakenings: none.     Compliance with medications is %.     Review of Systems   Constitutional:  Positive for irritability. Negative for chills and fever.   HENT:  Negative for ear pain and sore throat.    Eyes:  Negative for pain and visual disturbance.   Respiratory:  Negative for cough and shortness of breath.    Cardiovascular:  Negative for chest pain and palpitations.   Gastrointestinal:  Negative for abdominal pain and vomiting.   Genitourinary:  Negative for dysuria and hematuria.   Musculoskeletal:  Negative for arthralgias and back pain.   Skin:  Negative for color change and rash.   Neurological:  Negative for seizures and syncope.   Psychiatric/Behavioral:  Positive for dysphoric mood. The patient is nervous/anxious.    All other systems reviewed  "and are negative.      Objective   /77 (BP Location: Left arm, Patient Position: Sitting, Cuff Size: Standard)   Pulse 63   Temp (!) 97.4 °F (36.3 °C) (Temporal)   Ht 5' 4\" (1.626 m)   Wt 55.6 kg (122 lb 9.6 oz)   LMP  (LMP Unknown)   SpO2 98%   BMI 21.04 kg/m²      Physical Exam  Vitals reviewed.   Constitutional:       General: She is not in acute distress.     Appearance: Normal appearance. She is well-developed.   HENT:      Head: Normocephalic and atraumatic.   Eyes:      Conjunctiva/sclera: Conjunctivae normal.   Cardiovascular:      Rate and Rhythm: Normal rate and regular rhythm.      Pulses: Normal pulses.      Heart sounds: Normal heart sounds. No murmur heard.  Pulmonary:      Effort: Pulmonary effort is normal. No respiratory distress.      Breath sounds: Normal breath sounds.   Abdominal:      Palpations: Abdomen is soft.      Tenderness: There is no abdominal tenderness.   Musculoskeletal:         General: No swelling.      Cervical back: Neck supple.   Skin:     General: Skin is warm and dry.      Capillary Refill: Capillary refill takes less than 2 seconds.   Neurological:      Mental Status: She is alert.   Psychiatric:         Mood and Affect: Mood normal.       Administrative Statements   I have spent a total time of 35 minutes in caring for this patient on the day of the visit/encounter including Diagnostic results, Prognosis, Risks and benefits of tx options, Instructions for management, Risk factor reductions, Impressions, Counseling / Coordination of care, Documenting in the medical record, Reviewing / ordering tests, medicine, procedures  , and Obtaining or reviewing history  .   "

## 2024-12-30 ENCOUNTER — OFFICE VISIT (OUTPATIENT)
Dept: FAMILY MEDICINE CLINIC | Facility: CLINIC | Age: 79
End: 2024-12-30
Payer: COMMERCIAL

## 2024-12-30 VITALS
SYSTOLIC BLOOD PRESSURE: 142 MMHG | BODY MASS INDEX: 21.27 KG/M2 | HEIGHT: 64 IN | WEIGHT: 124.6 LBS | OXYGEN SATURATION: 99 % | HEART RATE: 72 BPM | RESPIRATION RATE: 18 BRPM | TEMPERATURE: 97.8 F | DIASTOLIC BLOOD PRESSURE: 88 MMHG

## 2024-12-30 DIAGNOSIS — I10 PRIMARY HYPERTENSION: ICD-10-CM

## 2024-12-30 DIAGNOSIS — Z00.00 MEDICARE ANNUAL WELLNESS VISIT, INITIAL: Primary | ICD-10-CM

## 2024-12-30 DIAGNOSIS — F41.9 ANXIETY: ICD-10-CM

## 2024-12-30 DIAGNOSIS — F33.0 MILD EPISODE OF RECURRENT MAJOR DEPRESSIVE DISORDER (HCC): ICD-10-CM

## 2024-12-30 PROCEDURE — 99213 OFFICE O/P EST LOW 20 MIN: CPT | Performed by: FAMILY MEDICINE

## 2024-12-30 PROCEDURE — G0439 PPPS, SUBSEQ VISIT: HCPCS | Performed by: FAMILY MEDICINE

## 2024-12-30 RX ORDER — ESCITALOPRAM OXALATE 5 MG/1
5 TABLET ORAL DAILY
Qty: 100 TABLET | Refills: 0 | Status: SHIPPED | OUTPATIENT
Start: 2024-12-30

## 2024-12-30 RX ORDER — METOPROLOL SUCCINATE 25 MG/1
25 TABLET, EXTENDED RELEASE ORAL DAILY
Qty: 100 TABLET | Refills: 1 | Status: SHIPPED | OUTPATIENT
Start: 2024-12-30

## 2024-12-30 NOTE — PATIENT INSTRUCTIONS
Medicare Preventive Visit Patient Instructions  Thank you for completing your Welcome to Medicare Visit or Medicare Annual Wellness Visit today. Your next wellness visit will be due in one year (12/31/2025).  The screening/preventive services that you may require over the next 5-10 years are detailed below. Some tests may not apply to you based off risk factors and/or age. Screening tests ordered at today's visit but not completed yet may show as past due. Also, please note that scanned in results may not display below.  Preventive Screenings:  Service Recommendations Previous Testing/Comments   Colorectal Cancer Screening  * Colonoscopy    * Fecal Occult Blood Test (FOBT)/Fecal Immunochemical Test (FIT)  * Fecal DNA/Cologuard Test  * Flexible Sigmoidoscopy Age: 45-75 years old   Colonoscopy: every 10 years (may be performed more frequently if at higher risk)  OR  FOBT/FIT: every 1 year  OR  Cologuard: every 3 years  OR  Sigmoidoscopy: every 5 years  Screening may be recommended earlier than age 45 if at higher risk for colorectal cancer. Also, an individualized decision between you and your healthcare provider will decide whether screening between the ages of 76-85 would be appropriate. Colonoscopy: 01/23/2023  FOBT/FIT: Not on file  Cologuard: Not on file  Sigmoidoscopy: Not on file          Breast Cancer Screening Age: 40+ years old  Frequency: every 1-2 years  Not required if history of left and right mastectomy Mammogram: 08/20/2024    History Breast Cancer   Cervical Cancer Screening Between the ages of 21-29, pap smear recommended once every 3 years.   Between the ages of 30-65, can perform pap smear with HPV co-testing every 5 years.   Recommendations may differ for women with a history of total hysterectomy, cervical cancer, or abnormal pap smears in past. Pap Smear: 01/21/2020    Screening Not Indicated   Hepatitis C Screening Once for adults born between 1945 and 1965  More frequently in patients at high  risk for Hepatitis C Hep C Antibody: 01/24/2020    Screening Current   Diabetes Screening 1-2 times per year if you're at risk for diabetes or have pre-diabetes Fasting glucose: No results in last 5 years (No results in last 5 years)  A1C: 5.8 % (2/9/2024)  Screening Current   Cholesterol Screening Once every 5 years if you don't have a lipid disorder. May order more often based on risk factors. Lipid panel: 02/03/2023    Screening Not Indicated  History Lipid Disorder     Other Preventive Screenings Covered by Medicare:  Abdominal Aortic Aneurysm (AAA) Screening: covered once if your at risk. You're considered to be at risk if you have a family history of AAA.  Lung Cancer Screening: covers low dose CT scan once per year if you meet all of the following conditions: (1) Age 55-77; (2) No signs or symptoms of lung cancer; (3) Current smoker or have quit smoking within the last 15 years; (4) You have a tobacco smoking history of at least 20 pack years (packs per day multiplied by number of years you smoked); (5) You get a written order from a healthcare provider.  Glaucoma Screening: covered annually if you're considered high risk: (1) You have diabetes OR (2) Family history of glaucoma OR (3)  aged 50 and older OR (4)  American aged 65 and older  Osteoporosis Screening: covered every 2 years if you meet one of the following conditions: (1) You're estrogen deficient and at risk for osteoporosis based off medical history and other findings; (2) Have a vertebral abnormality; (3) On glucocorticoid therapy for more than 3 months; (4) Have primary hyperparathyroidism; (5) On osteoporosis medications and need to assess response to drug therapy.   Last bone density test (DXA Scan): 05/04/2022.  HIV Screening: covered annually if you're between the age of 15-65. Also covered annually if you are younger than 15 and older than 65 with risk factors for HIV infection. For pregnant patients, it is covered  up to 3 times per pregnancy.    Immunizations:  Immunization Recommendations   Influenza Vaccine Annual influenza vaccination during flu season is recommended for all persons aged >= 6 months who do not have contraindications   Pneumococcal Vaccine   * Pneumococcal conjugate vaccine = PCV13 (Prevnar 13), PCV15 (Vaxneuvance), PCV20 (Prevnar 20)  * Pneumococcal polysaccharide vaccine = PPSV23 (Pneumovax) Adults 19-63 yo with certain risk factors or if 65+ yo  If never received any pneumonia vaccine: recommend Prevnar 20 (PCV20)  Give PCV20 if previously received 1 dose of PCV13 or PPSV23   Hepatitis B Vaccine 3 dose series if at intermediate or high risk (ex: diabetes, end stage renal disease, liver disease)   Respiratory syncytial virus (RSV) Vaccine - COVERED BY MEDICARE PART D  * RSVPreF3 (Arexvy) CDC recommends that adults 60 years of age and older may receive a single dose of RSV vaccine using shared clinical decision-making (SCDM)   Tetanus (Td) Vaccine - COST NOT COVERED BY MEDICARE PART B Following completion of primary series, a booster dose should be given every 10 years to maintain immunity against tetanus. Td may also be given as tetanus wound prophylaxis.   Tdap Vaccine - COST NOT COVERED BY MEDICARE PART B Recommended at least once for all adults. For pregnant patients, recommended with each pregnancy.   Shingles Vaccine (Shingrix) - COST NOT COVERED BY MEDICARE PART B  2 shot series recommended in those 19 years and older who have or will have weakened immune systems or those 50 years and older     Health Maintenance Due:      Topic Date Due   • Breast Cancer Screening: Mammogram  08/20/2025   • Hepatitis C Screening  Completed   • Colorectal Cancer Screening  Discontinued   • Cervical Cancer Screening  Discontinued     Immunizations Due:      Topic Date Due   • Pneumococcal Vaccine: 65+ Years (1 of 1 - PCV) Never done   • Influenza Vaccine (1) 09/01/2024   • COVID-19 Vaccine (6 - 2024-25 season)  09/01/2024     Advance Directives   What are advance directives?  Advance directives are legal documents that state your wishes and plans for medical care. These plans are made ahead of time in case you lose your ability to make decisions for yourself. Advance directives can apply to any medical decision, such as the treatments you want, and if you want to donate organs.   What are the types of advance directives?  There are many types of advance directives, and each state has rules about how to use them. You may choose a combination of any of the following:  Living will:  This is a written record of the treatment you want. You can also choose which treatments you do not want, which to limit, and which to stop at a certain time. This includes surgery, medicine, IV fluid, and tube feedings.   Durable power of  for healthcare (DPAHC):  This is a written record that states who you want to make healthcare choices for you when you are unable to make them for yourself. This person, called a proxy, is usually a family member or a friend. You may choose more than 1 proxy.  Do not resuscitate (DNR) order:  A DNR order is used in case your heart stops beating or you stop breathing. It is a request not to have certain forms of treatment, such as CPR. A DNR order may be included in other types of advance directives.  Medical directive:  This covers the care that you want if you are in a coma, near death, or unable to make decisions for yourself. You can list the treatments you want for each condition. Treatment may include pain medicine, surgery, blood transfusions, dialysis, IV or tube feedings, and a ventilator (breathing machine).  Values history:  This document has questions about your views, beliefs, and how you feel and think about life. This information can help others choose the care that you would choose.  Why are advance directives important?  An advance directive helps you control your care. Although spoken  wishes may be used, it is better to have your wishes written down. Spoken wishes can be misunderstood, or not followed. Treatments may be given even if you do not want them. An advance directive may make it easier for your family to make difficult choices about your care.   Fall Prevention    Fall prevention  includes ways to make your home and other areas safer. It also includes ways you can move more carefully to prevent a fall. Health conditions that cause changes in your blood pressure, vision, or muscle strength and coordination may increase your risk for falls. Medicines may also increase your risk for falls if they make you dizzy, weak, or sleepy.   Fall prevention tips:   Stand or sit up slowly.    Use assistive devices as directed.    Wear shoes that fit well and have soles that .    Wear a personal alarm.    Stay active.    Manage your medical conditions.    Home Safety Tips:  Add items to prevent falls in the bathroom.    Keep paths clear.    Install bright lights in your home.    Keep items you use often on shelves within reach.    Paint or place reflective tape on the edges of your stairs.       © Copyright [employer] 2018 Information is for End User's use only and may not be sold, redistributed or otherwise used for commercial purposes. All illustrations and images included in CareNotes® are the copyrighted property of [employer] or [employer]

## 2024-12-30 NOTE — PROGRESS NOTES
Name: Karen Strange      : 1945      MRN: [id number]  Encounter Provider: Baljit Bah DO  Encounter Date: 2024   Encounter department: [facility]    Assessment & Plan  Medicare annual wellness visit, initial         Mild episode of recurrent major depressive disorder (HCC)      Orders:    escitalopram (LEXAPRO) 5 mg tablet; Take 1 tablet (5 mg total) by mouth daily    Anxiety    Orders:    escitalopram (LEXAPRO) 5 mg tablet; Take 1 tablet (5 mg total) by mouth daily    Primary hypertension    Orders:    metoprolol succinate (Toprol XL) 25 mg 24 hr tablet; Take 1 tablet (25 mg total) by mouth daily       Preventive health issues were discussed with patient, and age appropriate screening tests were ordered as noted in patient's After Visit Summary. Personalized health advice and appropriate referrals for health education or preventive services given if needed, as noted in patient's After Visit Summary.    History of Present Illness     Medicare PE.  Refill Med.  Fell and fractured sacrum and 2 months later Fx pelvis.  Dealing with pain.  Getting Prolia shots.  Anxiety controlled with Lexapro, will refill.    Hypertension  This is a chronic problem. The current episode started more than 1 year ago. The problem has been resolved since onset. The problem is controlled. Associated symptoms include anxiety. Risk factors for coronary artery disease include dyslipidemia.   Hyperlipidemia       Patient Care Team:  Baljit Bah DO as PCP - General  DO Heraclio Palomino MD Thomas Lyster, DO Joseph Lennert, MD (Inactive)    Review of Systems   Constitutional: Negative.    HENT: Negative.     Eyes: Negative.    Respiratory: Negative.     Cardiovascular: Negative.    Gastrointestinal: Negative.    Genitourinary: Negative.    Musculoskeletal:  Positive for arthralgias and back pain.   Skin: Negative.    Neurological: Negative.    Psychiatric/Behavioral: Negative.       Medical  History Reviewed by provider this encounter:       Annual Wellness Visit Questionnaire   Karen is here for her Initial Wellness visit.     Health Risk Assessment:   Patient rates overall health as fair. Patient feels that their physical health rating is slightly worse. Patient is satisfied with their life. Eyesight was rated as slightly worse. Hearing was rated as same. Patient feels that their emotional and mental health rating is slightly worse. Patients states they are sometimes angry. Patient states they are often unusually tired/fatigued. Pain experienced in the last 7 days has been a lot. Patient's pain rating has been 5/10. Patient states that she has experienced no weight loss or gain in last 6 months. Tired    Fall Risk Screening:   In the past year, patient has experienced: history of falling in past year      Urinary Incontinence Screening:   Patient has not leaked urine accidently in the last six months.     Home Safety:  Patient has trouble with stairs inside or outside of their home. Patient has working smoke alarms and has working carbon monoxide detector. Home safety hazards include: none.     Nutrition:   Current diet is Regular.     Medications:   Patient is currently taking over-the-counter supplements. OTC medications include: see medication list. Patient is able to manage medications.     Activities of Daily Living (ADLs)/Instrumental Activities of Daily Living (IADLs):   Walk and transfer into and out of bed and chair?: Yes  Dress and groom yourself?: Yes    Bathe or shower yourself?: Yes    Feed yourself? Yes  Do your laundry/housekeeping?: Yes  Manage your money, pay your bills and track your expenses?: Yes  Make your own meals?: Yes    Do your own shopping?: Yes    Previous Hospitalizations:   Any hospitalizations or ED visits within the last 12 months?: No      Advance Care Planning:   Living will: No    Durable POA for healthcare: No    Advanced directive: No      Cognitive Screening:    Provider or family/friend/caregiver concerned regarding cognition?: No    PREVENTIVE SCREENINGS      Cardiovascular Screening:    General: Screening Not Indicated and History Lipid Disorder      Diabetes Screening:     General: Screening Current      Colorectal Cancer Screening:     General: Screening Current      Breast Cancer Screening:     General: History Breast Cancer      Cervical Cancer Screening:    General: Screening Not Indicated      Osteoporosis Screening:    General: Screening Current      Abdominal Aortic Aneurysm (AAA) Screening:        General: Screening Not Indicated      Lung Cancer Screening:     General: Screening Not Indicated      Hepatitis C Screening:    General: Screening Current    Screening, Brief Intervention, and Referral to Treatment (SBIRT)    Screening  Typical number of drinks in a day: 0  Typical number of drinks in a week: 0  Interpretation: Low risk drinking behavior.    AUDIT-C Screenin) How often did you have a drink containing alcohol in the past year? never  2) How many drinks did you have on a typical day when you were drinking in the past year? 0  3) How often did you have 6 or more drinks on one occasion in the past year? never    AUDIT-C Score: 0  Interpretation: Score 0-2 (female): Negative screen for alcohol misuse    Single Item Drug Screening:  How often have you used an illegal drug (including marijuana) or a prescription medication for non-medical reasons in the past year? never    Single Item Drug Screen Score: 0  Interpretation: Negative screen for possible drug use disorder    Other Counseling Topics:   Car/seat belt/driving safety, skin self-exam, sunscreen and calcium and vitamin D intake and regular weightbearing exercise.     Social Drivers of Health     Financial Resource Strain: Low Risk  (2024)    Received from [facility]    Overall Financial Resource Strain (CARDIA)     Difficulty of Paying Living Expenses: Not hard at all  "  Food Insecurity: No Food Insecurity (12/23/2024)    Hunger Vital Sign     Worried About Running Out of Food in the Last Year: Never true     Ran Out of Food in the Last Year: Never true   Transportation Needs: No Transportation Needs (12/23/2024)    PRAPARE - Transportation     Lack of Transportation (Medical): No     Lack of Transportation (Non-Medical): No   Housing Stability: Low Risk  (12/23/2024)    Housing Stability Vital Sign     Unable to Pay for Housing in the Last Year: No     Number of Times Moved in the Last Year: 0     Homeless in the Last Year: No   Utilities: Not At Risk (12/23/2024)    [facility] Utilities     Threatened with loss of utilities: No     No results found.    Objective   /88 (BP Location: Left arm, Patient Position: Sitting, Cuff Size: Standard)   Pulse 72   Temp 97.8 °F (36.6 °C) (Temporal)   Resp 18   Ht 5' 4\" (1.626 m)   Wt 56.5 kg (124 lb 9.6 oz)   LMP  (LMP Unknown)   SpO2 99%   BMI 21.39 kg/m²     Physical Exam  Constitutional:       Appearance: She is well-developed.   HENT:      Head: Normocephalic and atraumatic.      Right Ear: External ear normal.      Left Ear: External ear normal.      Nose: Nose normal.      Mouth/Throat:      Mouth: Mucous membranes are moist.      Pharynx: Oropharynx is clear.   Eyes:      Conjunctiva/sclera: Conjunctivae normal.      Pupils: Pupils are equal, round, and reactive to light.   Cardiovascular:      Rate and Rhythm: Normal rate and regular rhythm.      Heart sounds: Normal heart sounds.   Pulmonary:      Effort: Pulmonary effort is normal.      Breath sounds: Normal breath sounds.   Abdominal:      General: Abdomen is flat.      Palpations: Abdomen is soft.   Musculoskeletal:      Cervical back: Normal range of motion and neck supple.   Skin:     General: Skin is warm and dry.   Neurological:      Mental Status: She is alert and oriented to person, place, and time.      Deep Tendon Reflexes: Reflexes are normal and symmetric. "   Psychiatric:         Mood and Affect: Mood normal.         Behavior: Behavior normal.         Thought Content: Thought content normal.         Judgment: Judgment normal.

## 2025-01-15 ENCOUNTER — OFFICE VISIT (OUTPATIENT)
Dept: FAMILY MEDICINE CLINIC | Facility: CLINIC | Age: 80
End: 2025-01-15
Payer: COMMERCIAL

## 2025-01-15 VITALS
BODY MASS INDEX: 21.24 KG/M2 | HEIGHT: 64 IN | HEART RATE: 65 BPM | SYSTOLIC BLOOD PRESSURE: 142 MMHG | RESPIRATION RATE: 18 BRPM | DIASTOLIC BLOOD PRESSURE: 80 MMHG | OXYGEN SATURATION: 97 % | WEIGHT: 124.4 LBS | TEMPERATURE: 97.8 F

## 2025-01-15 DIAGNOSIS — C50.911 BREAST CANCER, STAGE 1, RIGHT (HCC): ICD-10-CM

## 2025-01-15 DIAGNOSIS — F43.9 STRESS AT HOME: Primary | ICD-10-CM

## 2025-01-15 DIAGNOSIS — E44.1 MILD PROTEIN-CALORIE MALNUTRITION (HCC): ICD-10-CM

## 2025-01-15 DIAGNOSIS — F33.0 MILD EPISODE OF RECURRENT MAJOR DEPRESSIVE DISORDER (HCC): ICD-10-CM

## 2025-01-15 DIAGNOSIS — R03.0 ELEVATED BLOOD PRESSURE READING: ICD-10-CM

## 2025-01-15 PROCEDURE — 99214 OFFICE O/P EST MOD 30 MIN: CPT | Performed by: FAMILY MEDICINE

## 2025-01-15 PROCEDURE — G2211 COMPLEX E/M VISIT ADD ON: HCPCS | Performed by: FAMILY MEDICINE

## 2025-01-15 NOTE — PROGRESS NOTES
"Name: Karen Strange      : 1945      MRN: [id number]  Encounter Provider: Baljit Bah DO  Encounter Date: 1/15/2025   Encounter department: [facility]  :  Assessment & Plan  Mild protein-calorie malnutrition (HCC)  Improved       Mild episode of recurrent major depressive disorder (HCC)  Controlled         Breast cancer, stage 1, right (HCC)  Stable       Stress at home         Elevated blood pressure reading           DC Advil      Chief Complaint   Patient presents with    Hypertension     Has been going on for couple of days before and after getting cortisone injection left knee. BP readings 170's - 200 systolic;  diastolic, associated with headache         History of Present Illness     Visit start at 3:25 pm  Stress, blood pressure elevated.  Left knee pain, RLB pain.  Has been taking Advil BID for since fracture of pelvis.  Started back at work,  stressing her out- he watches the news a lot of the day..  Continues with Ativan and Lexapro.      Review of Systems   Constitutional: Negative.    HENT: Negative.     Eyes: Negative.    Respiratory: Negative.     Cardiovascular: Negative.    Gastrointestinal: Negative.    Genitourinary: Negative.    Musculoskeletal: Negative.    Skin: Negative.    Neurological: Negative.    Psychiatric/Behavioral:          Stress       Objective   /80 (BP Location: Left arm, Patient Position: Sitting, Cuff Size: Standard)   Pulse 65   Temp 97.8 °F (36.6 °C) (Temporal)   Resp 18   Ht 5' 4\" (1.626 m)   Wt 56.4 kg (124 lb 6.4 oz)   LMP  (LMP Unknown)   SpO2 97%   BMI 21.35 kg/m²      Physical Exam  Constitutional:       Appearance: She is well-developed.   HENT:      Head: Normocephalic and atraumatic.      Right Ear: External ear normal.      Left Ear: External ear normal.      Nose: Nose normal.      Mouth/Throat:      Mouth: Mucous membranes are moist.      Pharynx: Oropharynx is clear.   Eyes:      Conjunctiva/sclera: " Conjunctivae normal.      Pupils: Pupils are equal, round, and reactive to light.   Cardiovascular:      Rate and Rhythm: Normal rate and regular rhythm.      Heart sounds: Normal heart sounds.   Pulmonary:      Effort: Pulmonary effort is normal.      Breath sounds: Normal breath sounds.   Musculoskeletal:      Cervical back: Normal range of motion and neck supple.   Skin:     General: Skin is warm and dry.   Neurological:      Mental Status: She is alert and oriented to person, place, and time.      Deep Tendon Reflexes: Reflexes are normal and symmetric.   Psychiatric:         Mood and Affect: Mood normal.         Behavior: Behavior normal.         Thought Content: Thought content normal.         Judgment: Judgment normal.     I have spent a total time of 33 minutes in caring for this patient on the day of the visit/encounter including Diagnostic results, Risks and benefits of tx options, Instructions for management, Patient and family education, Importance of tx compliance, Risk factor reductions, and Impressions.

## 2025-01-23 ENCOUNTER — TELEPHONE (OUTPATIENT)
Age: 80
End: 2025-01-23

## 2025-01-23 NOTE — TELEPHONE ENCOUNTER
"Patient called in stating she wanted to cancel her virtual visit for today     She states \"I just can't figure it out\"  I offered to walk Patient through it- refused stated she just wanted an office visit with Dr. Owen    Patient wanted to come in first week of feb   Scheduled for 2/3   "

## 2025-01-26 ENCOUNTER — OFFICE VISIT (OUTPATIENT)
Dept: URGENT CARE | Age: 80
End: 2025-01-26
Payer: COMMERCIAL

## 2025-01-26 VITALS
RESPIRATION RATE: 18 BRPM | TEMPERATURE: 97.1 F | DIASTOLIC BLOOD PRESSURE: 65 MMHG | OXYGEN SATURATION: 97 % | SYSTOLIC BLOOD PRESSURE: 128 MMHG | HEART RATE: 77 BPM

## 2025-01-26 DIAGNOSIS — R39.9 UTI SYMPTOMS: Primary | ICD-10-CM

## 2025-01-26 LAB
SL AMB  POCT GLUCOSE, UA: NEGATIVE
SL AMB LEUKOCYTE ESTERASE,UA: ABNORMAL
SL AMB POCT BILIRUBIN,UA: ABNORMAL
SL AMB POCT BLOOD,UA: ABNORMAL
SL AMB POCT CLARITY,UA: ABNORMAL
SL AMB POCT COLOR,UA: YELLOW
SL AMB POCT KETONES,UA: NEGATIVE
SL AMB POCT NITRITE,UA: NEGATIVE
SL AMB POCT PH,UA: 7.5
SL AMB POCT SPECIFIC GRAVITY,UA: 1
SL AMB POCT URINE PROTEIN: 30
SL AMB POCT UROBILINOGEN: 0.2

## 2025-01-26 PROCEDURE — S9083 URGENT CARE CENTER GLOBAL: HCPCS

## 2025-01-26 PROCEDURE — 81002 URINALYSIS NONAUTO W/O SCOPE: CPT

## 2025-01-26 PROCEDURE — 87086 URINE CULTURE/COLONY COUNT: CPT

## 2025-01-26 PROCEDURE — 87077 CULTURE AEROBIC IDENTIFY: CPT

## 2025-01-26 PROCEDURE — G0382 LEV 3 HOSP TYPE B ED VISIT: HCPCS

## 2025-01-26 RX ORDER — CEPHALEXIN 500 MG/1
500 CAPSULE ORAL EVERY 8 HOURS SCHEDULED
Qty: 15 CAPSULE | Refills: 0 | Status: SHIPPED | OUTPATIENT
Start: 2025-01-26 | End: 2025-01-31

## 2025-01-26 NOTE — PROGRESS NOTES
Assessment/Plan  Urine dip performed in office suggests possible infection, urine culture results pending and [facility].   Please begin antibiotics as directed.   Stay well hydrated, may use Azo or cranberry juice products as needed for symptoms relief.   Follow up with PCP if no relief within 3-5 days.   Go to ED for fever or flank pain.      UTI symptoms [R39.9]  1. UTI symptoms  cephalexin (KEFLEX) 500 mg capsule      Patient Education     Urinary Tract Infection, Adult ED   General Information   You came to the Emergency Department (ED) for a urinary tract infection or UTI. Most UTIs are infections in either your bladder or your kidneys. Bladder infections are more common and may also be called cystitis. Kidney infections are more serious and may also be called pyelonephritis. You need antibiotics to treat a UTI. It is important to take all of your antibiotics even if you start to feel better.  What care is needed at home?   Call your regular doctor to let them know you were in the ED. Make a follow-up appointment if you were told to.  For the first day or so, you may want to take an over-the-counter medicine, like phenazopyridine. This will help to numb your bladder. You will also not have the strong urge to urinate. This medicine causes your urine and tears to look orange. If you have kidney disease, talk to your doctor before taking this medicine.  To lower your chance of getting a UTI in the future, you can:  Drink extra fluids.  If you have sex, urinate right afterwards.  When do I need to get emergency help?   Return to the ED if:   You have very bad pain in your back, shoulder, or belly.  You have a fever of 102.2°F (39°C); shaking chills or sweats even though you are taking antibiotics.  When do I need to call the doctor?   You have a fever up to 100.4°F (38°C).  You notice more blood in your urine.  Your signs get worse or do not improve within 24 hours of  starting treatment.  You are not able to urinate for more than 8 hours  Your signs come back after treatment has stopped.  You have new or worsening symptoms.  Last Reviewed Date   2021-03-12  Consumer Information Use and Disclaimer   This generalized information is a limited summary of diagnosis, treatment, and/or medication information. It is not meant to be comprehensive and should be used as a tool to help the user understand and/or assess potential diagnostic and treatment options. It does NOT include all information about conditions, treatments, medications, side effects, or risks that may apply to a specific patient. It is not intended to be medical advice or a substitute for the medical advice, diagnosis, or treatment of a health care provider based on the health care provider's examination and assessment of a patient’s specific and unique circumstances. Patients must speak with a health care provider for complete information about their health, medical questions, and treatment options, including any risks or benefits regarding use of medications. This information does not endorse any treatments or medications as safe, effective, or approved for treating a specific patient. [employer] and its affiliates disclaim any warranty or liability relating to this information or the use thereof. The use of this information is governed by the Terms of Use, available at https://www.[employer].com/en/know/clinical-effectiveness-terms   Copyright   Copyright © 2024 [employer], [employer] and its affiliates and/or licensors. All rights reserved.      Subjective:     Patient ID: Karen Strange is a 79 y.o. female.      Reason For Visit / Chief Complaint  Chief Complaint   Patient presents with    Possible UTI     Onset 1/24 Pt states burning upon urination, chills, frequent urination          Difficulty Urinating   This is a new problem. The current episode started in the past 7 days (x 2 days). The problem occurs every  "urination. The problem has been gradually worsening. The quality of the pain is described as burning. There has been no fever. Sexually active: denies concern for STI. There is No history of pyelonephritis. Associated symptoms include chills, frequency, hesitancy and urgency. Pertinent negatives include no discharge, flank pain, hematuria, nausea, possible pregnancy, sweats or vomiting. She has tried nothing for the symptoms. The treatment provided no relief. There is no history of catheterization, kidney stones, recurrent UTIs, a single kidney, urinary stasis or a urological procedure. Interstitial cystitis           Past Medical History:   Diagnosis Date    Allergic     Anxiety     Arthritis     Johnston's esophagus 09/2020    Bulging discs     Cancer (HCC) Breast    R breast    Chronic interstitial cystitis     Colon polyp     Diverticulosis     Elevated amylase     Last Assessed:  3/24/15    Fibromyalgia, primary     GERD (gastroesophageal reflux disease)     Hyperlipidemia     Hypertension     Irritable bowel syndrome     Migraine     Pelvic floor dysfunction     Pneumonia     Psychiatric disorder     Ribs, multiple fractures     2/2019    Serum lipase elevation     Last Assessed:  9/17/13    Vitamin D deficiency        Past Surgical History:   Procedure Laterality Date    BAND HEMORRHOIDECTOMY      internal    BREAST LUMPECTOMY Right 10/2016    CHOLECYSTECTOMY      COLONOSCOPY      2016, \"very difficult exam\" per H&P    TONSILLECTOMY      TOOTH EXTRACTION      TOTAL ABDOMINAL HYSTERECTOMY W/ BILATERAL SALPINGOOPHORECTOMY  08/04/2021    TUBAL LIGATION         Family History   Problem Relation Age of Onset    Hypertension Mother     Heart disease Father     Hypertension Father     Stroke Father         Syndrome       Review of Systems   Constitutional:  Positive for chills. Negative for fatigue and fever.   HENT:  Negative for congestion, ear discharge, ear pain, postnasal drip, rhinorrhea, sinus pressure, sinus " pain, sneezing and sore throat.    Eyes: Negative.  Negative for pain, discharge, redness and itching.   Respiratory: Negative.  Negative for apnea, cough, choking, chest tightness, shortness of breath, wheezing and stridor.    Cardiovascular: Negative.  Negative for chest pain and palpitations.   Gastrointestinal: Negative.  Negative for diarrhea, nausea and vomiting.   Endocrine: Negative.  Negative for polydipsia, polyphagia and polyuria.   Genitourinary:  Positive for difficulty urinating, dysuria, frequency, hesitancy and urgency. Negative for decreased urine volume, dyspareunia, enuresis, flank pain, genital sores, hematuria, menstrual problem, pelvic pain, vaginal bleeding, vaginal discharge and vaginal pain.   Musculoskeletal: Negative.  Negative for arthralgias, back pain, myalgias, neck pain and neck stiffness.   Skin: Negative.  Negative for color change and rash.   Allergic/Immunologic: Negative.  Negative for environmental allergies.   Neurological: Negative.  Negative for dizziness, facial asymmetry, light-headedness, numbness and headaches.   Hematological: Negative.  Negative for adenopathy.   Psychiatric/Behavioral: Negative.         Objective:    /65   Pulse 77   Temp (!) 97.1 °F (36.2 °C)   Resp 18   LMP  (LMP Unknown)   SpO2 97%     Physical Exam  Vitals and nursing note reviewed.   Constitutional:       General: She is not in acute distress.     Appearance: Normal appearance. She is not ill-appearing, toxic-appearing or diaphoretic.      Interventions: She is not intubated.  HENT:      Head: Normocephalic and atraumatic.      Right Ear: External ear normal.      Left Ear: External ear normal.      Nose: Nose normal. No congestion or rhinorrhea.      Mouth/Throat:      Mouth: Mucous membranes are moist.   Eyes:      Extraocular Movements: Extraocular movements intact.      Conjunctiva/sclera: Conjunctivae normal.      Pupils: Pupils are equal, round, and reactive to light.    Cardiovascular:      Rate and Rhythm: Normal rate and regular rhythm.      Pulses: Normal pulses.      Heart sounds: Normal heart sounds, S1 normal and S2 normal. Heart sounds not distant. No murmur heard.     No friction rub. No gallop.   Pulmonary:      Effort: Pulmonary effort is normal. No tachypnea, bradypnea, accessory muscle usage, prolonged expiration, respiratory distress or retractions. She is not intubated.      Breath sounds: Normal breath sounds. No stridor, decreased air movement or transmitted upper airway sounds. No decreased breath sounds, wheezing, rhonchi or rales.   Chest:      Chest wall: No tenderness.   Abdominal:      General: Abdomen is flat. Bowel sounds are normal.      Palpations: Abdomen is soft.      Tenderness: There is no abdominal tenderness. There is no right CVA tenderness, left CVA tenderness, guarding or rebound.   Musculoskeletal:         General: Normal range of motion.      Cervical back: Normal range of motion and neck supple. No tenderness.   Skin:     General: Skin is warm and dry.      Capillary Refill: Capillary refill takes less than 2 seconds.   Neurological:      General: No focal deficit present.      Mental Status: She is alert and oriented to person, place, and time.      Cranial Nerves: No cranial nerve deficit.   Psychiatric:         Mood and Affect: Mood normal.         Behavior: Behavior normal.

## 2025-01-27 LAB — BACTERIA UR CULT: ABNORMAL

## 2025-02-04 ENCOUNTER — OFFICE VISIT (OUTPATIENT)
Dept: FAMILY MEDICINE CLINIC | Facility: CLINIC | Age: 80
End: 2025-02-04
Payer: COMMERCIAL

## 2025-02-04 VITALS
TEMPERATURE: 97.9 F | HEART RATE: 67 BPM | BODY MASS INDEX: 21.27 KG/M2 | HEIGHT: 64 IN | OXYGEN SATURATION: 97 % | DIASTOLIC BLOOD PRESSURE: 76 MMHG | SYSTOLIC BLOOD PRESSURE: 136 MMHG | WEIGHT: 124.6 LBS

## 2025-02-04 DIAGNOSIS — F33.0 MILD EPISODE OF RECURRENT MAJOR DEPRESSIVE DISORDER (HCC): ICD-10-CM

## 2025-02-04 DIAGNOSIS — F41.9 ANXIETY: Primary | ICD-10-CM

## 2025-02-04 PROCEDURE — G2211 COMPLEX E/M VISIT ADD ON: HCPCS | Performed by: FAMILY MEDICINE

## 2025-02-04 PROCEDURE — 99214 OFFICE O/P EST MOD 30 MIN: CPT | Performed by: FAMILY MEDICINE

## 2025-02-04 RX ORDER — ESCITALOPRAM OXALATE 5 MG/1
5 TABLET ORAL DAILY
Qty: 100 TABLET | Refills: 1 | Status: SHIPPED | OUTPATIENT
Start: 2025-02-04

## 2025-02-04 RX ORDER — LORAZEPAM 1 MG/1
1 TABLET ORAL
Qty: 90 TABLET | Refills: 0 | Status: SHIPPED | OUTPATIENT
Start: 2025-02-04

## 2025-02-04 NOTE — ASSESSMENT & PLAN NOTE
Doing very ell    Orders:  •  escitalopram (LEXAPRO) 5 mg tablet; Take 1 tablet (5 mg total) by mouth daily

## 2025-02-04 NOTE — PROGRESS NOTES
"Name: Karen Strange      : 1945      MRN: [id number]  Encounter Provider: Domenic Owen DO  Encounter Date: 2025   Encounter department: [facility]  :  Assessment & Plan  Anxiety  Refill meds  Doing very very well!  [place] 3 days weekly  Orders:  •  LORazepam (ATIVAN) 1 mg tablet; Take 1 tablet (1 mg total) by mouth daily at bedtime as needed for anxiety  •  escitalopram (LEXAPRO) 5 mg tablet; Take 1 tablet (5 mg total) by mouth daily    Mild episode of recurrent major depressive disorder (HCC)  Doing very ell    Orders:  •  escitalopram (LEXAPRO) 5 mg tablet; Take 1 tablet (5 mg total) by mouth daily           History of Present Illness   Improved anxiety! Stable on lexapro. Found job , 3 days a week.    Happy for her!      Review of Systems   Constitutional:  Negative for chills and fever.   HENT:  Negative for ear pain and sore throat.    Eyes:  Negative for pain and visual disturbance.   Respiratory:  Negative for cough and shortness of breath.    Cardiovascular:  Negative for chest pain and palpitations.   Gastrointestinal:  Negative for abdominal pain and vomiting.   Genitourinary:  Negative for dysuria and hematuria.   Musculoskeletal:  Negative for arthralgias and back pain.   Skin:  Negative for color change and rash.   Neurological:  Negative for seizures and syncope.   All other systems reviewed and are negative.      Objective   /76   Pulse 67   Temp 97.9 °F (36.6 °C)   Ht 5' 4\" (1.626 m)   Wt 56.5 kg (124 lb 9.6 oz)   LMP  (LMP Unknown)   SpO2 97%   BMI 21.39 kg/m²      Physical Exam  Vitals reviewed.   Constitutional:       General: She is not in acute distress.     Appearance: She is well-developed.   HENT:      Head: Normocephalic and atraumatic.   Eyes:      Conjunctiva/sclera: Conjunctivae normal.   Cardiovascular:      Rate and Rhythm: Normal rate and regular rhythm.      Heart sounds: No murmur heard.  Pulmonary:      Effort: Pulmonary effort is normal. No " respiratory distress.      Breath sounds: Normal breath sounds.   Abdominal:      Palpations: Abdomen is soft.      Tenderness: There is no abdominal tenderness.   Musculoskeletal:         General: No swelling.      Cervical back: Neck supple.   Skin:     General: Skin is warm and dry.      Capillary Refill: Capillary refill takes less than 2 seconds.   Neurological:      Mental Status: She is alert.   Psychiatric:         Mood and Affect: Mood normal.       Administrative Statements   I have spent a total time of 34 minutes in caring for this patient on the day of the visit/encounter including Diagnostic results, Prognosis, Risks and benefits of tx options, Instructions for management, Patient and family education, Importance of tx compliance, Risk factor reductions, Impressions, Counseling / Coordination of care, Documenting in the medical record, Reviewing / ordering tests, medicine, procedures  , and Obtaining or reviewing history  .

## 2025-02-04 NOTE — ASSESSMENT & PLAN NOTE
Refill meds  Doing very very well!  [place] 3 days weekly  Orders:  •  LORazepam (ATIVAN) 1 mg tablet; Take 1 tablet (1 mg total) by mouth daily at bedtime as needed for anxiety  •  escitalopram (LEXAPRO) 5 mg tablet; Take 1 tablet (5 mg total) by mouth daily

## 2025-05-08 DIAGNOSIS — F41.9 ANXIETY: ICD-10-CM

## 2025-05-08 NOTE — TELEPHONE ENCOUNTER
Patient called in stating she is going to be due for the following medication but there is no refill mentioned on it. She would want Dr. Bah to call in an new prescription. Please do help and call in to [facility]. Thanks

## 2025-05-12 RX ORDER — LORAZEPAM 1 MG/1
1 TABLET ORAL
Qty: 90 TABLET | Refills: 0 | Status: SHIPPED | OUTPATIENT
Start: 2025-05-12

## 2025-05-13 ENCOUNTER — OFFICE VISIT (OUTPATIENT)
Dept: FAMILY MEDICINE CLINIC | Facility: CLINIC | Age: 80
End: 2025-05-13
Payer: COMMERCIAL

## 2025-05-13 VITALS
WEIGHT: 128.6 LBS | TEMPERATURE: 98.3 F | SYSTOLIC BLOOD PRESSURE: 142 MMHG | HEART RATE: 69 BPM | BODY MASS INDEX: 21.95 KG/M2 | DIASTOLIC BLOOD PRESSURE: 80 MMHG | OXYGEN SATURATION: 96 % | HEIGHT: 64 IN

## 2025-05-13 DIAGNOSIS — J02.0 STREP PHARYNGITIS: Primary | ICD-10-CM

## 2025-05-13 PROCEDURE — G2211 COMPLEX E/M VISIT ADD ON: HCPCS | Performed by: FAMILY MEDICINE

## 2025-05-13 PROCEDURE — 99214 OFFICE O/P EST MOD 30 MIN: CPT | Performed by: FAMILY MEDICINE

## 2025-05-13 RX ORDER — AMLODIPINE BESYLATE 10 MG/1
10 TABLET ORAL DAILY
COMMUNITY
Start: 2025-04-28

## 2025-05-13 RX ORDER — PREDNISONE 20 MG/1
40 TABLET ORAL DAILY
Qty: 6 TABLET | Refills: 0 | Status: SHIPPED | OUTPATIENT
Start: 2025-05-13 | End: 2025-05-13

## 2025-05-13 RX ORDER — CEPHALEXIN 500 MG/1
500 CAPSULE ORAL 3 TIMES DAILY
Qty: 21 CAPSULE | Refills: 0 | Status: SHIPPED | OUTPATIENT
Start: 2025-05-13 | End: 2025-05-13

## 2025-05-13 RX ORDER — PREDNISONE 20 MG/1
40 TABLET ORAL DAILY
Qty: 6 TABLET | Refills: 0 | Status: SHIPPED | OUTPATIENT
Start: 2025-05-13 | End: 2025-05-16

## 2025-05-13 RX ORDER — CEPHALEXIN 500 MG/1
500 CAPSULE ORAL 3 TIMES DAILY
Qty: 21 CAPSULE | Refills: 0 | Status: SHIPPED | OUTPATIENT
Start: 2025-05-13 | End: 2025-05-20

## 2025-05-13 RX ORDER — METOPROLOL SUCCINATE 50 MG/1
50 TABLET, EXTENDED RELEASE ORAL DAILY
COMMUNITY
Start: 2025-04-02

## 2025-05-13 RX ORDER — MIRABEGRON 25 MG/1
1 TABLET, FILM COATED, EXTENDED RELEASE ORAL DAILY
COMMUNITY
Start: 2025-03-18

## 2025-05-13 NOTE — PROGRESS NOTES
Name: Karen Strange      : 1945      MRN: [id number]  Encounter Provider: Domenic Owen DO  Encounter Date: 2025   Encounter department: [facility]  :  Assessment & Plan  Strep pharyngitis    Possible recurrent strep pharyngitis with swollen glands, cervical lymphadenopathy, off-and-on fever with dry cough.  Due to multiple antibiotic allergies will prescribe Keflex 500 mg 3 times daily for 7 days  - Prednisone 40 mg for 3 days for anti-inflammatory effect.  Orders:  •  cephalexin (KEFLEX) 500 mg capsule; Take 1 capsule (500 mg total) by mouth 3 (three) times a day for 7 days  •  predniSONE 20 mg tablet; Take 2 tablets (40 mg total) by mouth daily for 3 days           History of Present Illness   Sore Throat   This is a new problem. The current episode started in the past 7 days. The problem has been waxing and waning. Neither side of throat is experiencing more pain than the other. There has been no fever. The pain is at a severity of 6/10. The pain is moderate. Associated symptoms include congestion and a hoarse voice. Pertinent negatives include no abdominal pain, coughing, ear pain, shortness of breath or vomiting. She has had no exposure to strep or mono. She has tried acetaminophen and cool liquids for the symptoms. The treatment provided mild relief.     Review of Systems   Constitutional:  Positive for activity change, fatigue and fever. Negative for chills.   HENT:  Positive for congestion, hoarse voice, postnasal drip, sore throat and voice change. Negative for ear pain.    Eyes:  Negative for pain and visual disturbance.   Respiratory:  Negative for cough and shortness of breath.    Cardiovascular:  Negative for chest pain and palpitations.   Gastrointestinal:  Negative for abdominal pain and vomiting.   Genitourinary:  Negative for dysuria and hematuria.   Musculoskeletal:  Negative for arthralgias and back pain.   Skin:  Negative for color change and rash.   Neurological:   "Negative for seizures and syncope.   All other systems reviewed and are negative.      Objective   /80 (BP Location: Left arm, Patient Position: Sitting, Cuff Size: Standard)   Pulse 69   Temp 98.3 °F (36.8 °C) (Temporal)   Ht 5' 4\" (1.626 m)   Wt 58.3 kg (128 lb 9.6 oz)   LMP  (LMP Unknown)   SpO2 96%   BMI 22.07 kg/m²      Physical Exam  Vitals and nursing note reviewed.   Constitutional:       General: She is not in acute distress.     Appearance: She is well-developed.   HENT:      Head: Normocephalic and atraumatic.      Nose: Congestion and rhinorrhea present.      Mouth/Throat:      Pharynx: Pharyngeal swelling, posterior oropharyngeal erythema and uvula swelling present.      Tonsils: No tonsillar exudate or tonsillar abscesses. 2+ on the right. 2+ on the left.   Eyes:      Conjunctiva/sclera: Conjunctivae normal.   Cardiovascular:      Rate and Rhythm: Normal rate and regular rhythm.      Heart sounds: No murmur heard.  Pulmonary:      Effort: Pulmonary effort is normal. No respiratory distress.      Breath sounds: Normal breath sounds.   Abdominal:      Palpations: Abdomen is soft.      Tenderness: There is no abdominal tenderness.   Musculoskeletal:         General: No swelling.      Cervical back: Neck supple.   Skin:     General: Skin is warm and dry.      Capillary Refill: Capillary refill takes less than 2 seconds.   Neurological:      Mental Status: She is alert.   Psychiatric:         Mood and Affect: Mood normal.       Administrative Statements   I have spent a total time of 35 minutes in caring for this patient on the day of the visit/encounter including Diagnostic results, Prognosis, Risks and benefits of tx options, Instructions for management, Patient and family education, Importance of tx compliance, Risk factor reductions, Impressions, Counseling / Coordination of care, Documenting in the medical record, Reviewing/placing orders in the medical record (including tests, " medications, and/or procedures), and Obtaining or reviewing history  .

## 2025-05-13 NOTE — ASSESSMENT & PLAN NOTE
Possible recurrent strep pharyngitis with swollen glands, cervical lymphadenopathy, off-and-on fever with dry cough.  Due to multiple antibiotic allergies will prescribe Keflex 500 mg 3 times daily for 7 days  - Prednisone 40 mg for 3 days for anti-inflammatory effect.  Orders:  •  cephalexin (KEFLEX) 500 mg capsule; Take 1 capsule (500 mg total) by mouth 3 (three) times a day for 7 days  •  predniSONE 20 mg tablet; Take 2 tablets (40 mg total) by mouth daily for 3 days

## 2025-05-21 ENCOUNTER — APPOINTMENT (OUTPATIENT)
Dept: LAB | Age: 80
End: 2025-05-21
Payer: COMMERCIAL

## 2025-05-21 ENCOUNTER — TRANSCRIBE ORDERS (OUTPATIENT)
Dept: ADMINISTRATIVE | Age: 80
End: 2025-05-21

## 2025-05-21 DIAGNOSIS — R19.7 DIARRHEA, UNSPECIFIED TYPE: ICD-10-CM

## 2025-05-21 DIAGNOSIS — K59.00 CONSTIPATION, UNSPECIFIED CONSTIPATION TYPE: ICD-10-CM

## 2025-05-21 DIAGNOSIS — R19.7 DIARRHEA, UNSPECIFIED TYPE: Primary | ICD-10-CM

## 2025-05-21 PROCEDURE — 83993 ASSAY FOR CALPROTECTIN FECAL: CPT

## 2025-05-23 LAB — CALPROTECTIN STL-MCNC: 191 ÂΜG/G

## 2025-06-22 NOTE — PROGRESS NOTES
UROLOGY PROGRESS NOTE   Patient Identifiers: Mary Gonzalez (MRN [id number])  Date of Service: 11/25/2019    Subjective:     78-year-old female with history of pelvic floor dysfunction  She had been doing nicely on Elavil and Ativan p r n  However her oncologist asked her the  The Elavil due to its interaction with tamoxifen  She has more recently been diagnosed with ulcerative colitis and proctitis  This is keeping her up at night as well  She has no incontinence no UTI symptoms      Patient   Follow-up of pelvic floor dysfunction    Objective:     VITALS:    Vitals:    11/25/19 1331   Pulse: 70           LABS:  Lab Results   Component Value Date    HGB 13 8 08/01/2019    HCT 43 4 08/01/2019    WBC 9 26 08/01/2019     08/01/2019   ]    Lab Results   Component Value Date     07/01/2015    K 3 9 02/26/2019     (H) 02/26/2019    CO2 27 02/26/2019    BUN 14 02/26/2019    CREATININE 0 70 02/26/2019    CALCIUM 8 9 02/26/2019    GLUCOSE 97 07/01/2015   ]        INPATIENT MEDS:    Current Outpatient Medications:     amLODIPine (NORVASC) 10 mg tablet, Take 1 tablet (10 mg total) by mouth daily for 180 days, Disp: 90 tablet, Rfl: 1    aspirin (ECOTRIN LOW STRENGTH) 81 mg EC tablet, Take 81 mg by mouth daily  , Disp: , Rfl:     benazepril (LOTENSIN) 10 mg tablet, Take 1 tablet (10 mg total) by mouth daily, Disp: 90 tablet, Rfl: 1    CALCIUM CITRATE PO, Take by mouth, Disp: , Rfl:     Calcium Glycerophosphate (PRELIEF PO), Take 2 tablets by mouth as needed, Disp: , Rfl:     cholecalciferol (VITAMIN D3) 1,000 units tablet, Take 1,000 Units by mouth daily  , Disp: , Rfl:     cyanocobalamin (VITAMIN B-12) 100 mcg tablet, Take 50 mcg by mouth daily, Disp: , Rfl:     mesalamine (CANASA) 1,000 mg suppository, Insert 1 suppository (1,000 mg total) into the rectum daily at bedtime for 180 days, Disp: 30 suppository, Rfl: 5    simvastatin (ZOCOR) 20 mg tablet, Take 1 tablet (20 mg total) by mouth daily at bedtime for 180 days (Patient taking differently: Take 20 mg by mouth every other day ), Disp: 90 tablet, Rfl: 1    LORazepam (ATIVAN) 0 5 mg tablet, Take 1 tablet (0 5 mg total) by mouth daily at bedtime as needed for anxiety, Disp: 30 tablet, Rfl: 5    tamoxifen (NOLVADEX) 20 mg tablet, Take 20 mg by mouth daily  , Disp: , Rfl:       Physical Exam:   Pulse 70   Ht 5' 4" (1 626 m)   Wt 60 8 kg (134 lb)   LMP  (LMP Unknown)   BMI 23 00 kg/m²   GEN: no acute distress    RESP: breathing comfortably with no accessory muscle use    ABD: soft, non-tender, non-distended   INCISION:    EXT: no significant peripheral edema   (FeMale):  Pelvic: external genitalia normal, urethra without abnormality or discharge, vagina normal without discharge, rectovaginal septum normal,  urethral caruncle is present  No significant atrophy or cystocele  and urethral meatus    RADIOLOGY:     None     Assessment:    1  Pelvic floor dysfunction     Plan:   - I  reordered her on Ativan 0 5 mg  P r n   HS  -  I would like her to try Celexa or Lexapro if approved by her oncologist  -  - capillary refill. No swelling, deformity, tenderness or bony tenderness. Normal pulse.      Comments: Limited left knee exam due to recent surgery but does report her range of motion is at baseline since the procedure and no new changes today   Skin:     General: Skin is warm and dry.      Capillary Refill: Capillary refill takes less than 2 seconds. Left foot      Coloration: Skin is not ashen, cyanotic, jaundiced or pale.      Findings: Abrasion present. No abscess, bruising or erythema.          Neurological:      General: No focal deficit present.      Mental Status: She is alert. Mental status is at baseline.      GCS: GCS eye subscore is 4. GCS verbal subscore is 5. GCS motor subscore is 6.      Cranial Nerves: No dysarthria.      Sensory: Sensory deficit (tingling to left foot is chronic since surgery per patient) present.      Motor: Motor function is intact. No weakness, tremor, atrophy, abnormal muscle tone or seizure activity.      Comments: Strength 5 out of 5 with ankle dorsiflexion/plantarflexion and great toe dorsiflexion/plantarflexion bilaterally       Psychiatric:         Attention and Perception: Attention normal.         Mood and Affect: Mood and affect normal. Mood is not anxious or depressed. Affect is not tearful.         Speech: Speech normal. Speech is not delayed or slurred.         Behavior: Behavior normal. Behavior is cooperative.             DIAGNOSTIC RESULTS   :    Labs Reviewed   CBC WITH AUTO DIFFERENTIAL - Abnormal; Notable for the following components:       Result Value    RBC 3.44 (*)     Hemoglobin 10.8 (*)     Hematocrit 31.6 (*)     All other components within normal limits   COMPREHENSIVE METABOLIC PANEL - Abnormal; Notable for the following components:    Glucose 117 (*)     Creatinine 0.5 (*)     All other components within normal limits   TROPONIN   BRAIN NATRIURETIC PEPTIDE   LACTIC ACID       All other labs were within normal range or not returned asof this

## 2025-06-30 ENCOUNTER — OFFICE VISIT (OUTPATIENT)
Dept: URGENT CARE | Age: 80
End: 2025-06-30
Payer: COMMERCIAL

## 2025-06-30 VITALS
SYSTOLIC BLOOD PRESSURE: 128 MMHG | HEART RATE: 72 BPM | TEMPERATURE: 96.9 F | RESPIRATION RATE: 18 BRPM | DIASTOLIC BLOOD PRESSURE: 62 MMHG | OXYGEN SATURATION: 95 %

## 2025-06-30 DIAGNOSIS — R19.7 DIARRHEA, UNSPECIFIED TYPE: Primary | ICD-10-CM

## 2025-06-30 PROCEDURE — S9083 URGENT CARE CENTER GLOBAL: HCPCS

## 2025-06-30 PROCEDURE — G0383 LEV 4 HOSP TYPE B ED VISIT: HCPCS

## 2025-06-30 RX ORDER — CEPHALEXIN 500 MG/1
500 CAPSULE ORAL EVERY 12 HOURS SCHEDULED
Qty: 10 CAPSULE | Refills: 0 | Status: SHIPPED | OUTPATIENT
Start: 2025-06-30 | End: 2025-07-05

## 2025-06-30 RX ORDER — CIPROFLOXACIN 500 MG/1
500 TABLET, FILM COATED ORAL 2 TIMES DAILY
COMMUNITY
Start: 2025-06-28 | End: 2025-07-03

## 2025-06-30 NOTE — PROGRESS NOTES
[facility]  Name: Karen Strange      : 1945      MRN: [id number]  Encounter Provider: SHAWN Lieberman  Encounter Date: 2025   [facility]  :  Assessment & Plan  Diarrhea, unspecified type    Orders:  •  cephalexin (KEFLEX) 500 mg capsule; Take 1 capsule (500 mg total) by mouth every 12 (twelve) hours for 5 days    Recommending you stop the Cipro today.  Start Keflex today.      Zofran as needed for nausea and vomiting.  If you do feel nauseous or if you do vomit, take one tablet with a small sip of water.  Allow the medicine some time to work, about 15-20 minutes.  Then start with small sips of water, no more than a table spoon at a time.      Concern for C-Diff. Follow up with your PCP for stool culturing. And further discussion of antibiotic use [facility]    Please go to the Emergency Department if you develop any worsening symptoms, develop any worsening symptoms, abdominal pain, uncontrolled nausea or vomiting, or any new symptoms.    Patient Instructions  Follow up with PCP in 3-5 days.  Proceed to  ER if symptoms worsen.    If tests are performed, our office will contact you with results only if changes need to made to the care plan discussed with you at the visit. [facility]    Chief Complaint:   Chief Complaint   Patient presents with   • Diarrhea     Diarrhea started  evening. Patient started an antibiotic Saturday for a UTI. Was seen at a [facility].Also experiencing nausea, cramps and chills.      History of Present Illness {?Quick Links Encounters * My Last Note * Last Note in Specialty * Snapshot * Since Last Visit * History :65612}  79-year-old female presents to urgent care with complaints of diarrhea, nausea, chills and headache for the last 2 days. Patient was recently diagnosed with a UTI 3 days ago and was started on Cipro BID  for 5 days. Patient states that her urinary symptoms have improved. Patient denies any fever, vomiting, shortness of breath, chest pain, or flank pain. Patient has a PMH of IBS-C and did take Linzess this morning. Patient also has a history of C. Diff approx 5 years ago and is concerned that she may have a recurrence of this. Patient has not taken any other medications for her diarrhea or nausea. Patient is not staying well-hydrated and states she only had one cup of coffee and a few sips of water this morning.     Diarrhea   Associated symptoms include chills. Pertinent negatives include no fever, headaches or vomiting.         Review of Systems   Constitutional:  Positive for chills. Negative for fatigue and fever.   Gastrointestinal:  Positive for diarrhea and nausea. Negative for vomiting.   Genitourinary:  Negative for dysuria, flank pain, frequency, hematuria and urgency.   Neurological:  Negative for dizziness, weakness, light-headedness and headaches.     Past Medical History   Past Medical History[1]  Past Surgical History[2]  Family History[3]  she reports that she quit smoking about 27 years ago. Her smoking use included cigarettes. She started smoking about 54 years ago. She has a 13.5 pack-year smoking history. She has never used smokeless tobacco. She reports that she does not drink alcohol and does not use drugs.  Current Outpatient Medications   Medication Instructions   • Acetaminophen 1,000 mg, Every 6 hours PRN   • amLODIPine (NORVASC) 5 mg tablet 1 tablet, Daily   • amLODIPine (NORVASC) 10 mg, Daily   • benazepril (LOTENSIN) 40 MG tablet 1 tablet, Daily   • calcium carbonate (Tums) 500 mg chewable tablet 1 tablet, Daily at bedtime PRN   • cephalexin (KEFLEX) 500 mg, Oral, Every 12 hours scheduled   • ciprofloxacin (CIPRO) 500 mg, 2 times daily   • escitalopram (LEXAPRO) 5 mg, Oral, Daily   • Lactobacillus Rhamnosus, GG, (Culturelle Kids) CHEW 1 tablet, Every evening   • Lactobacillus TABS 1  tablet, Every evening   • Lidocaine 4 % LOTN 1 Application, Daily   • LORazepam (ATIVAN) 1 mg, Oral, Daily at bedtime PRN   • mesalamine (CANASA) 1,000 mg, Daily at bedtime   • methylPREDNISolone 4 MG tablet therapy pack Use as directed on package   • metoprolol succinate (TOPROL XL) 25 mg, Oral, Daily   • metoprolol succinate (TOPROL-XL) 50 mg, Daily   • Mirabegron ER 25 MG TB24 1 tablet, Daily   • omeprazole (PRILOSEC) 20 mg, Daily   • rosuvastatin (CRESTOR) 5 mg, 3 times weekly   Allergies[4]     Objective {?Quick Links Trend Vitals * Enter New Vitals * Results Review * Timeline (Adult) * Labs * Imaging * Cardiology * Procedures * Lung Cancer Screening * Surgical eConsent :78070}  /62   Pulse 72   Temp (!) 96.9 °F (36.1 °C) (Tympanic)   Resp 18   LMP  (LMP Unknown)   SpO2 95%      Physical Exam  Vitals and nursing note reviewed.   Constitutional:       General: She is not in acute distress.     Appearance: Normal appearance. She is normal weight. She is not ill-appearing or toxic-appearing.     Cardiovascular:      Rate and Rhythm: Normal rate and regular rhythm.      Pulses: Normal pulses.      Heart sounds: Normal heart sounds.   Pulmonary:      Effort: Pulmonary effort is normal.      Breath sounds: Normal breath sounds.   Abdominal:      General: Abdomen is flat. There is no distension.      Palpations: Abdomen is soft. There is no mass.      Tenderness: There is abdominal tenderness (generalized with increased tenderness over suprapubic area.). There is no right CVA tenderness or left CVA tenderness.     Skin:     General: Skin is warm and dry.      Capillary Refill: Capillary refill takes 2 to 3 seconds.     Neurological:      Mental Status: She is alert and oriented to person, place, and time.     Psychiatric:         Mood and Affect: Mood normal.         Behavior: Behavior normal.         Thought Content: Thought content normal.         Judgment: Judgment normal.         Portions of the record  "may have been created with voice recognition software.  Occasional wrong word or \"sound a like\" substitutions may have occurred due to the inherent limitations of voice recognition software.  Read the chart carefully and recognize, using context, where substitutions have occurred.         [1]  Past Medical History:  Diagnosis Date   • Allergic    • Anxiety    • Arthritis    • Johnston's esophagus 09/2020   • Bulging discs    • Cancer (HCC) Breast    R breast   • Chronic interstitial cystitis    • Colon polyp    • Depression Years   • Diverticulosis    • Elevated amylase     Last Assessed:  3/24/15   • Fibromyalgia, primary    • GERD (gastroesophageal reflux disease)    • Hyperlipidemia    • Hypertension    • Inflammatory bowel disease About 2 years ago    Got it froo a bout with c-diff from antibiotics   • Irritable bowel syndrome    • Migraine    • Pelvic floor dysfunction    • Pneumonia    • Psychiatric disorder    • Ribs, multiple fractures     2/2019   • Serum lipase elevation     Last Assessed:  9/17/13   • Urinary tract infection 2 weeks ago   • Vitamin D deficiency    [2]  Past Surgical History:  Procedure Laterality Date   • BAND HEMORRHOIDECTOMY      internal   • BREAST LUMPECTOMY Right 10/2016   • CARDIAC VALVE REPLACEMENT     • CHOLECYSTECTOMY     • COLONOSCOPY      2016, \"very difficult exam\" per H&P   • HYSTERECTOMY  3 years ago   • SPINE SURGERY     • TONSILLECTOMY     • TOOTH EXTRACTION     • TOTAL ABDOMINAL HYSTERECTOMY W/ BILATERAL SALPINGOOPHORECTOMY  08/04/2021   • TUBAL LIGATION     [3]  Family History  Problem Relation Name Age of Onset   • Hypertension Mother Karen Kearney    • Heart disease Father Vinod Kearney    • Hypertension Father Vinod Kearney    • Stroke Father Vinod Kearney         Syndrome   • Dementia Father Vniod Kearney    • Breast cancer Maternal Grandmother Meaghan Bauman         Breast removal   [4]  Allergies  Allergen Reactions   • Amoxicillin Other (See Comments)     " C-Diff   • Bactrim [Sulfamethoxazole-Trimethoprim]    • Doxycycline GI Intolerance   • Lyrica [Pregabalin]    • Pneumovax 23 [Pneumococcal Vac Polyvalent]    • Pollen Extract    • Prevnar 13 [Pneumococcal 13-Deandra Conj Vacc] Swelling   • Sertraline Chest Pain   • Trazodone Other (See Comments)     Dry mouth    • Zithromax [Azithromycin]

## 2025-06-30 NOTE — PATIENT INSTRUCTIONS
Recommending you stop the Cipro today.  Start Keflex today.      Zofran as needed for nausea and vomiting.  If you do feel nauseous or if you do vomit, take one tablet with a small sip of water.  Allow the medicine some time to work, about 15-20 minutes.  Then start with small sips of water, no more than a table spoon at a time.     Follow up with your PCP for stool culturing. And further discussion of antibiotic use and urine cultures results from [facility].    Please go to the Emergency Department if you develop any worsening symptoms, develop any worsening symptoms, abdominal pain, uncontrolled nausea or vomiting, or any new symptoms.

## 2025-07-01 ENCOUNTER — OFFICE VISIT (OUTPATIENT)
Dept: FAMILY MEDICINE CLINIC | Facility: CLINIC | Age: 80
End: 2025-07-01
Payer: COMMERCIAL

## 2025-07-01 VITALS
SYSTOLIC BLOOD PRESSURE: 112 MMHG | OXYGEN SATURATION: 98 % | BODY MASS INDEX: 21.99 KG/M2 | RESPIRATION RATE: 18 BRPM | WEIGHT: 128.8 LBS | TEMPERATURE: 97.3 F | HEIGHT: 64 IN | DIASTOLIC BLOOD PRESSURE: 68 MMHG | HEART RATE: 66 BPM

## 2025-07-01 DIAGNOSIS — F33.0 MILD EPISODE OF RECURRENT MAJOR DEPRESSIVE DISORDER (HCC): ICD-10-CM

## 2025-07-01 DIAGNOSIS — F41.9 ANXIETY: ICD-10-CM

## 2025-07-01 PROCEDURE — 99213 OFFICE O/P EST LOW 20 MIN: CPT | Performed by: FAMILY MEDICINE

## 2025-07-01 PROCEDURE — G2211 COMPLEX E/M VISIT ADD ON: HCPCS | Performed by: FAMILY MEDICINE

## 2025-07-01 RX ORDER — LORAZEPAM 1 MG/1
1 TABLET ORAL
Qty: 90 TABLET | Refills: 1 | Status: SHIPPED | OUTPATIENT
Start: 2025-07-01

## 2025-07-01 RX ORDER — ESCITALOPRAM OXALATE 5 MG/1
5 TABLET ORAL DAILY
Qty: 100 TABLET | Refills: 1 | Status: SHIPPED | OUTPATIENT
Start: 2025-07-01

## 2025-07-01 NOTE — PROGRESS NOTES
"Name: Karen Strange      : 1945      MRN: [id number]  Encounter Provider: Baljit Bah DO  Encounter Date: 2025   Encounter department: [facility]  :  Assessment & Plan  Anxiety    Orders:    escitalopram (LEXAPRO) 5 mg tablet; Take 1 tablet (5 mg total) by mouth daily    LORazepam (ATIVAN) 1 mg tablet; Take 1 tablet (1 mg total) by mouth daily at bedtime as needed for anxiety    Mild episode of recurrent major depressive disorder (HCC)      Orders:    escitalopram (LEXAPRO) 5 mg tablet; Take 1 tablet (5 mg total) by mouth daily      Chief Complaint   Patient presents with    Urgent care follow up 25 diarrhea     Was prescribed Cephalexin 500 mg Take 1 capsule (500 mg total) by mouth every 12 (twelve) hours for 5 days         History of Present Illness   Diarrhea stopped.  Self cath's bladder because of back injury- 3 to 4 X a day.  Lexapro is helping for depression.  Continues working.      Review of Systems   Constitutional: Negative.    HENT: Negative.     Eyes: Negative.    Respiratory: Negative.     Cardiovascular: Negative.    Gastrointestinal: Negative.    Genitourinary: Negative.    Musculoskeletal:  Positive for arthralgias.   Skin: Negative.    Neurological: Negative.    Psychiatric/Behavioral: Negative.         Objective   /68 (BP Location: Left arm, Patient Position: Sitting, Cuff Size: Standard)   Pulse 66   Temp (!) 97.3 °F (36.3 °C) (Temporal)   Resp 18   Ht 5' 4\" (1.626 m)   Wt 58.4 kg (128 lb 12.8 oz)   LMP  (LMP Unknown)   SpO2 98%   BMI 22.11 kg/m²      Physical Exam  Constitutional:       Appearance: She is well-developed.   HENT:      Head: Normocephalic and atraumatic.      Right Ear: External ear normal.      Left Ear: External ear normal.      Nose: Nose normal.      Mouth/Throat:      Mouth: Mucous membranes are moist.      Pharynx: Oropharynx is clear.     Eyes:      Conjunctiva/sclera: Conjunctivae normal.      Pupils: Pupils are equal, " round, and reactive to light.       Cardiovascular:      Rate and Rhythm: Normal rate and regular rhythm.      Heart sounds: Normal heart sounds.   Pulmonary:      Effort: Pulmonary effort is normal.      Breath sounds: Normal breath sounds.   Abdominal:      General: Bowel sounds are normal.     Musculoskeletal:      Cervical back: Normal range of motion and neck supple.     Skin:     General: Skin is warm and dry.     Neurological:      Mental Status: She is alert and oriented to person, place, and time.      Deep Tendon Reflexes: Reflexes are normal and symmetric.     Psychiatric:         Behavior: Behavior normal.         Thought Content: Thought content normal.         Judgment: Judgment normal.

## 2025-07-01 NOTE — ASSESSMENT & PLAN NOTE
Orders:    escitalopram (LEXAPRO) 5 mg tablet; Take 1 tablet (5 mg total) by mouth daily    LORazepam (ATIVAN) 1 mg tablet; Take 1 tablet (1 mg total) by mouth daily at bedtime as needed for anxiety

## 2025-07-15 ENCOUNTER — OFFICE VISIT (OUTPATIENT)
Dept: FAMILY MEDICINE CLINIC | Facility: CLINIC | Age: 80
End: 2025-07-15
Payer: COMMERCIAL

## 2025-07-15 VITALS
HEIGHT: 64 IN | WEIGHT: 129 LBS | OXYGEN SATURATION: 96 % | HEART RATE: 60 BPM | SYSTOLIC BLOOD PRESSURE: 116 MMHG | BODY MASS INDEX: 22.02 KG/M2 | RESPIRATION RATE: 18 BRPM | TEMPERATURE: 98.2 F | DIASTOLIC BLOOD PRESSURE: 68 MMHG

## 2025-07-15 DIAGNOSIS — R60.9 DEPENDENT EDEMA: Primary | ICD-10-CM

## 2025-07-15 DIAGNOSIS — I10 ESSENTIAL HYPERTENSION: ICD-10-CM

## 2025-07-15 PROCEDURE — G2211 COMPLEX E/M VISIT ADD ON: HCPCS | Performed by: FAMILY MEDICINE

## 2025-07-15 PROCEDURE — 99213 OFFICE O/P EST LOW 20 MIN: CPT | Performed by: FAMILY MEDICINE

## 2025-07-16 LAB
ALBUMIN SERPL-MCNC: 4.3 G/DL (ref 3.5–5.7)
ALP SERPL-CCNC: 60 U/L (ref 35–120)
ALT SERPL-CCNC: 8 U/L
ANION GAP SERPL CALCULATED.3IONS-SCNC: 9 MMOL/L (ref 3–11)
AST SERPL-CCNC: 15 U/L
BILIRUB DIRECT SERPL-MCNC: 0.2 MG/DL (ref 0–0.2)
BILIRUB SERPL-MCNC: 1.4 MG/DL (ref 0.2–1)
BUN SERPL-MCNC: 11 MG/DL (ref 7–25)
CALCIUM SERPL-MCNC: 9.8 MG/DL (ref 8.5–10.5)
CHLORIDE SERPL-SCNC: 101 MMOL/L (ref 100–109)
CHOLEST SERPL-MCNC: 248 MG/DL
CHOLEST/HDLC SERPL: 4.4 {RATIO}
CO2 SERPL-SCNC: 28 MMOL/L (ref 21–31)
CREAT SERPL-MCNC: 0.63 MG/DL (ref 0.4–1.1)
CYTOLOGY CMNT CVX/VAG CYTO-IMP: NORMAL
ERYTHROCYTE [DISTWIDTH] IN BLOOD BY AUTOMATED COUNT: 13.3 % (ref 12–16)
GFR/BSA.PRED SERPLBLD CYS-BASED-ARV: 90 ML/MIN/{1.73_M2}
GLUCOSE SERPL-MCNC: 89 MG/DL (ref 65–99)
HCT VFR BLD AUTO: 43.2 % (ref 35–43)
HDLC SERPL-MCNC: 56 MG/DL (ref 23–92)
HGB BLD-MCNC: 14.3 G/DL (ref 11.5–14.5)
LDLC SERPL CALC-MCNC: 153 MG/DL
MCH RBC QN AUTO: 28.6 PG (ref 26–34)
MCHC RBC AUTO-ENTMCNC: 33.2 G/DL (ref 32–37)
MCV RBC AUTO: 86 FL (ref 80–100)
NONHDLC SERPL-MCNC: 192 MG/DL
PLATELET # BLD AUTO: 296 THOU/CMM (ref 140–350)
PMV BLD REES-ECKER: 11 FL (ref 7.5–11.3)
POTASSIUM SERPL-SCNC: 4.6 MMOL/L (ref 3.5–5.2)
PROT SERPL-MCNC: 6.6 G/DL (ref 6.3–8.3)
RBC # BLD AUTO: 5.01 MILL/CMM (ref 3.7–4.7)
SODIUM SERPL-SCNC: 138 MMOL/L (ref 135–145)
TRIGL SERPL-MCNC: 195 MG/DL
WBC # BLD AUTO: 8 THOU/CMM (ref 4–10)

## 2025-08-04 DIAGNOSIS — F41.9 ANXIETY: ICD-10-CM

## 2025-08-05 RX ORDER — LORAZEPAM 1 MG/1
1 TABLET ORAL
Qty: 90 TABLET | Refills: 1 | Status: SHIPPED | OUTPATIENT
Start: 2025-08-05

## 2025-08-09 ENCOUNTER — APPOINTMENT (OUTPATIENT)
Dept: LAB | Age: 80
End: 2025-08-09
Payer: COMMERCIAL

## 2025-08-11 ENCOUNTER — APPOINTMENT (OUTPATIENT)
Dept: LAB | Age: 80
End: 2025-08-11
Payer: COMMERCIAL

## 2025-08-19 ENCOUNTER — CONSULT (OUTPATIENT)
Dept: FAMILY MEDICINE CLINIC | Facility: CLINIC | Age: 80
End: 2025-08-19
Payer: COMMERCIAL

## 2025-08-19 VITALS
DIASTOLIC BLOOD PRESSURE: 62 MMHG | WEIGHT: 129.2 LBS | OXYGEN SATURATION: 98 % | SYSTOLIC BLOOD PRESSURE: 132 MMHG | TEMPERATURE: 97.8 F | HEART RATE: 72 BPM | HEIGHT: 64 IN | BODY MASS INDEX: 22.06 KG/M2

## 2025-08-19 DIAGNOSIS — I10 ESSENTIAL HYPERTENSION: ICD-10-CM

## 2025-08-19 DIAGNOSIS — R39.9 UTI SYMPTOMS: ICD-10-CM

## 2025-08-19 DIAGNOSIS — N39.0 FREQUENT UTI: ICD-10-CM

## 2025-08-19 DIAGNOSIS — G89.29 CHRONIC PAIN OF LEFT KNEE: ICD-10-CM

## 2025-08-19 DIAGNOSIS — M25.562 CHRONIC PAIN OF LEFT KNEE: ICD-10-CM

## 2025-08-19 DIAGNOSIS — E78.2 MODERATE MIXED HYPERLIPIDEMIA NOT REQUIRING STATIN THERAPY: ICD-10-CM

## 2025-08-19 DIAGNOSIS — C50.911 BREAST CANCER, STAGE 1, RIGHT (HCC): ICD-10-CM

## 2025-08-19 DIAGNOSIS — Z01.818 PRE-OPERATIVE CLEARANCE: Primary | ICD-10-CM

## 2025-08-19 DIAGNOSIS — F41.9 ANXIETY: ICD-10-CM

## 2025-08-19 DIAGNOSIS — F32.0 CURRENT MILD EPISODE OF MAJOR DEPRESSIVE DISORDER WITHOUT PRIOR EPISODE (HCC): ICD-10-CM

## 2025-08-19 DIAGNOSIS — K22.719 BARRETT'S ESOPHAGUS WITH DYSPLASIA: ICD-10-CM

## 2025-08-19 LAB
SL AMB  POCT GLUCOSE, UA: NEGATIVE
SL AMB LEUKOCYTE ESTERASE,UA: ABNORMAL
SL AMB POCT BILIRUBIN,UA: NEGATIVE
SL AMB POCT BLOOD,UA: NEGATIVE
SL AMB POCT CLARITY,UA: CLEAR
SL AMB POCT COLOR,UA: YELLOW
SL AMB POCT KETONES,UA: ABNORMAL
SL AMB POCT NITRITE,UA: NEGATIVE
SL AMB POCT PH,UA: 6
SL AMB POCT SPECIFIC GRAVITY,UA: 1.01
SL AMB POCT URINE PROTEIN: NEGATIVE
SL AMB POCT UROBILINOGEN: NEGATIVE

## 2025-08-19 PROCEDURE — 93000 ELECTROCARDIOGRAM COMPLETE: CPT | Performed by: FAMILY MEDICINE

## 2025-08-19 PROCEDURE — 99215 OFFICE O/P EST HI 40 MIN: CPT | Performed by: FAMILY MEDICINE

## 2025-08-19 PROCEDURE — 81003 URINALYSIS AUTO W/O SCOPE: CPT | Performed by: FAMILY MEDICINE

## 2025-08-19 RX ORDER — CEFPODOXIME PROXETIL 200 MG/1
200 TABLET, FILM COATED ORAL 2 TIMES DAILY
Qty: 14 TABLET | Refills: 0 | Status: SHIPPED | OUTPATIENT
Start: 2025-08-19 | End: 2025-08-26